# Patient Record
Sex: MALE | Race: WHITE | NOT HISPANIC OR LATINO | Employment: FULL TIME | URBAN - METROPOLITAN AREA
[De-identification: names, ages, dates, MRNs, and addresses within clinical notes are randomized per-mention and may not be internally consistent; named-entity substitution may affect disease eponyms.]

---

## 2021-05-26 ENCOUNTER — TELEPHONE (OUTPATIENT)
Dept: NEUROSURGERY | Facility: CLINIC | Age: 51
End: 2021-05-26

## 2021-05-26 NOTE — TELEPHONE ENCOUNTER
5/26/21   RECEIVED CALL FROM PT PCP TO HAVE NEUROSURGEON REVIEW PT RECENT MRI LSPINE AND CSPINE 5/17/21 [facility] FOR ANY URGENCY  DR Subhash Kerr WOULD LIKE TO REFER PT TO DR Hayley Flaherty  MRI'S REVIEWED BY DR Hayley Flaherty AND DR Cesar Cardona  OK TO SCHEDULE PT WITH DR Hayley Flaherty 5/28/21 IN [facility] TO REVIEW  PT NEEDS DISCS OF MRIS [facility] FOR APPT  CALLED PT 2X UNABLE TO LEAVE [facility]IC[place] NOT SET UP YET  WILL CONTINUE TO TRY TO CONTACT PT FOR APPT

## 2021-05-28 ENCOUNTER — TRANSCRIBE ORDERS (OUTPATIENT)
Dept: NEUROSURGERY | Facility: CLINIC | Age: 51
End: 2021-05-28

## 2021-05-28 DIAGNOSIS — M54.50 LOWER BACK PAIN: ICD-10-CM

## 2021-05-28 DIAGNOSIS — M54.2 NECK PAIN: Primary | ICD-10-CM

## 2021-06-03 ENCOUNTER — CONSULT (OUTPATIENT)
Dept: NEUROSURGERY | Facility: CLINIC | Age: 51
End: 2021-06-03
Payer: COMMERCIAL

## 2021-06-03 VITALS
HEART RATE: 61 BPM | TEMPERATURE: 98.6 F | DIASTOLIC BLOOD PRESSURE: 79 MMHG | BODY MASS INDEX: 22.48 KG/M2 | RESPIRATION RATE: 16 BRPM | SYSTOLIC BLOOD PRESSURE: 114 MMHG | WEIGHT: 166 LBS | HEIGHT: 72 IN

## 2021-06-03 DIAGNOSIS — M48.8X2 OSSIFICATION OF POSTERIOR LONGITUDINAL LIGAMENT IN CERVICAL REGION (HCC): ICD-10-CM

## 2021-06-03 DIAGNOSIS — M54.50 LOWER BACK PAIN: ICD-10-CM

## 2021-06-03 DIAGNOSIS — M54.2 NECK PAIN: ICD-10-CM

## 2021-06-03 DIAGNOSIS — M47.12 OTHER SPONDYLOSIS WITH MYELOPATHY, CERVICAL REGION: Primary | ICD-10-CM

## 2021-06-03 PROCEDURE — 99204 OFFICE O/P NEW MOD 45 MIN: CPT | Performed by: NEUROLOGICAL SURGERY

## 2021-06-03 RX ORDER — FINASTERIDE 5 MG/1
5 TABLET, FILM COATED ORAL DAILY
COMMUNITY
Start: 2021-05-25

## 2021-06-03 NOTE — PROGRESS NOTES
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 [facility])        Relevant Orders    XR spine cervical complete 6+ vw flex/ext/obl    CT cervical spine without contrast            Subjective:      Patient ID: Nick Delgadillo is a 46 y o  male  HPI    The following portions of the patient's history were reviewed and updated as appropriate: He  has a past medical history of Enlarged prostate  He There are no active problems to display for this patient  He  has a past surgical history that includes Abscess drainage  His family history includes Anxiety disorder in his father; Diabetes in his father; Hypertension in his mother; Rheum arthritis in his mother       Review of Systems   Constitutional: Negative  HENT: Negative  Eyes: Negative  Respiratory: Negative  Cardiovascular: Negative  Gastrointestinal: Negative  Endocrine: Negative  Genitourinary: Positive for difficulty urinating  Musculoskeletal: Positive for back pain (lower back pain left sided for about 1 year), gait problem (limping gait, unsteady gait), myalgias (muscle stiffenss difficulty bending neck and lower back), neck pain (radiates down left shoulder for about 2 years) and neck stiffness (difficulty turning head to the left)  PT/CHELY: NONE    8/10 pain level   Skin: Negative  Allergic/Immunologic: Negative  Neurological: Positive for weakness (Left leg), light-headedness (occasionally depending on neck positioning ) and numbness (Left hand tingling and B/L L>R tingling in feet)  Negative for dizziness  Hematological: Does not bruise/bleed easily  Psychiatric/Behavioral: Negative  Objective: There were no vitals taken for this visit  Physical Exam      I have personally obtain history and examined patient  I have personally reviewed case including all pertinent investigations/studies  Time spent 60 minutes  More than 50% of total time spent on counseling and coordination of care as described above including patient education, discussion of risks and rationale of conservative vs surgical treatment options  Presentation     Progressive  hx of neck stiffness/pain in with Arm /hand pain and paraesthesia  Associated weakness, loss of fine motor control, deficits in gait and coordination  Describes fingers and hands occasionally locking up  LBP with left LE radiation  Denies history of recent trauma, bowel or bladder deficit  Physical findings    Moderate restriction in cervical ROM  Positive spurlings R>L  Grade 4-/5 bilateral  strength  Reduced sensation in nonradicular distribution  Reduced fine motor and coordination  Reduced EZEKIEL  Increased DTR with positive pascual's L>R  Spreading of brachioradialis reflex  Slow wide based gait  Unsteady tandem gait        Radiology    MRI    Severe multilevel cervical spondylosis in the setting of spinal stenosis and OPLL C4-7  Levels  Marked disc/osteophyte complexes in conjunction with facet hypertrophy/ligmentum hypertrophy causing circumferential stenosis  Cord compression with marked cord signal change at C4/5  Canal diameter reduced to < 5 mm  Generalize foraminal stenosis    Moderate L5/S1 disc degeneration without significant central or foraminal stenosis          Discussion and Summary    Mr Hardy has neck pain/stiffness in the setting of cervical spinal stenosis/OPLL with  Radiculopathy and progressive myelopathy/cord compression  Today we discussed conservative  Treatments including PT, CHELY and medications for neck pain and back pain  In the setting of   Progressive deficits/myelpathy, I see no indication for these options  We discussed  The natural history of his conditions including the risk of progression vs catastrophic  Injury in the setting of trauma  I asked him to avoid vigorous head and neck movements,  Axial loading, chiropractic manipulation  I have ordered a flex/ext xray as well as a CT to better characterize his condition  I will see him in fu in 2 weeks to review

## 2021-06-22 ENCOUNTER — HOSPITAL ENCOUNTER (OUTPATIENT)
Dept: RADIOLOGY | Facility: HOSPITAL | Age: 51
Discharge: HOME | End: 2021-06-22
Attending: NEUROLOGICAL SURGERY
Payer: COMMERCIAL

## 2021-06-22 ENCOUNTER — HOSPITAL ENCOUNTER (OUTPATIENT)
Dept: CT IMAGING | Facility: HOSPITAL | Age: 51
Discharge: HOME | End: 2021-06-22
Attending: NEUROLOGICAL SURGERY
Payer: COMMERCIAL

## 2021-06-22 DIAGNOSIS — M54.50 LOWER BACK PAIN: ICD-10-CM

## 2021-06-22 DIAGNOSIS — M47.12 OTHER SPONDYLOSIS WITH MYELOPATHY, CERVICAL REGION: ICD-10-CM

## 2021-06-22 DIAGNOSIS — M54.2 NECK PAIN: ICD-10-CM

## 2021-06-22 DIAGNOSIS — M48.8X2 OSSIFICATION OF POSTERIOR LONGITUDINAL LIGAMENT IN CERVICAL REGION (HCC): ICD-10-CM

## 2021-06-22 PROCEDURE — G1004 CDSM NDSC: HCPCS

## 2021-06-22 PROCEDURE — 72125 CT NECK SPINE W/O DYE: CPT

## 2021-06-22 PROCEDURE — 72052 X-RAY EXAM NECK SPINE 6/>VWS: CPT

## 2021-06-25 ENCOUNTER — OFFICE VISIT (OUTPATIENT)
Dept: NEUROSURGERY | Facility: CLINIC | Age: 51
End: 2021-06-25
Payer: COMMERCIAL

## 2021-06-25 VITALS
SYSTOLIC BLOOD PRESSURE: 117 MMHG | RESPIRATION RATE: 16 BRPM | HEART RATE: 68 BPM | BODY MASS INDEX: 22.48 KG/M2 | HEIGHT: 72 IN | TEMPERATURE: 68 F | DIASTOLIC BLOOD PRESSURE: 71 MMHG | WEIGHT: 166 LBS

## 2021-06-25 DIAGNOSIS — M47.12 OTHER SPONDYLOSIS WITH MYELOPATHY, CERVICAL REGION: ICD-10-CM

## 2021-06-25 DIAGNOSIS — M54.12 RADICULOPATHY, CERVICAL: ICD-10-CM

## 2021-06-25 DIAGNOSIS — M54.2 NECK PAIN: Primary | ICD-10-CM

## 2021-06-25 DIAGNOSIS — M48.02 DEGENERATIVE CERVICAL SPINAL STENOSIS: ICD-10-CM

## 2021-06-25 PROCEDURE — 99214 OFFICE O/P EST MOD 30 MIN: CPT | Performed by: NEUROLOGICAL SURGERY

## 2021-06-25 RX ORDER — CEFAZOLIN SODIUM 1 G/50ML
1000 SOLUTION INTRAVENOUS ONCE
Status: CANCELLED | OUTPATIENT
Start: 2021-06-25 | End: 2021-06-25

## 2021-06-25 NOTE — PROGRESS NOTES
Assessment/Plan:    No problem-specific Assessment & Plan notes found for this encounter  Problem List Items Addressed This Visit     None      Visit Diagnoses     Neck pain    -  Primary    Relevant Orders    Case request operating room: CORPECTOMY SPINE CERVICAL ANTERIOR: C4-7 ACDF with c5 corpectomy/hemicorpectomy w anterior instrumentation and fusion (Completed)    Ambulatory referral to Internal Medicine    Other spondylosis with myelopathy, cervical region        Relevant Orders    Case request operating room: CORPECTOMY SPINE CERVICAL ANTERIOR: C4-7 ACDF with c5 corpectomy/hemicorpectomy w anterior instrumentation and fusion (Completed)    Ambulatory referral to Internal Medicine    Degenerative cervical spinal stenosis        Relevant Orders    Case request operating room: CORPECTOMY SPINE CERVICAL ANTERIOR: C4-7 ACDF with c5 corpectomy/hemicorpectomy w anterior instrumentation and fusion (Completed)    Ambulatory referral to Internal Medicine    Radiculopathy, cervical        Relevant Orders    Case request operating room: CORPECTOMY SPINE CERVICAL ANTERIOR: C4-7 ACDF with c5 corpectomy/hemicorpectomy w anterior instrumentation and fusion (Completed)    Ambulatory referral to Internal Medicine            Subjective:      Patient ID: Jerome Powell is a 46 y o  male  HPI    The following portions of the patient's history were reviewed and updated as appropriate: He  has a past medical history of Enlarged prostate  He There are no problems to display for this patient  He  has a past surgical history that includes Abscess drainage  His family history includes Anxiety disorder in his father; Diabetes in his father; Hypertension in his mother; Rheum arthritis in his mother  He  reports that he has been smoking  He has been smoking about 0 50 packs per day  He has never used smokeless tobacco  He reports that he does not drink alcohol and does not use drugs    Current Outpatient Medications Medication Sig Dispense Refill    finasteride (PROSCAR) 5 mg tablet Take 5 mg by mouth daily       No current facility-administered medications for this visit  Current Outpatient Medications on File Prior to Visit   Medication Sig    finasteride (PROSCAR) 5 mg tablet Take 5 mg by mouth daily     No current facility-administered medications on file prior to visit  He has No Known Allergies       Review of Systems   Constitutional: Negative  HENT: Negative  Eyes: Negative  Respiratory: Negative  Cardiovascular: Negative  Gastrointestinal: Negative  Endocrine: Negative  Genitourinary: Positive for difficulty urinating  Musculoskeletal: Positive for back pain (lower back pain left sided for about 1 year), gait problem (limping gait, unsteady gait), myalgias (muscle stiffenss difficulty bending neck and lower back), neck pain (radiates down left shoulder for about 2 years) and neck stiffness (difficulty turning head to the left)  PT/CHELY: NONE    8/10 pain level   Skin: Negative  Allergic/Immunologic: Negative  Neurological: Positive for weakness (Left leg), light-headedness (occasionally depending on neck positioning ) and numbness (Left hand tingling and B/L L>R tingling in feet)  Negative for dizziness  Hematological: Does not bruise/bleed easily  Psychiatric/Behavioral: Negative  Objective:      /71   Pulse 68   Temp (!) 68 °F (20 °C) (Tympanic)   Resp 16   Ht 6' (1 829 m)   Wt 75 3 kg (166 lb)   BMI 22 51 kg/m²          Physical Exam      I have personally obtain history and examined patient  I have personally reviewed case including all pertinent investigations/studies       Time spent 35 minutes   More than 50% of total time spent on counseling and coordination of care as described above including patient education, discussion of risks and rationale of conservative vs surgical treatment options        Presentation      Progressive  hx of neck stiffness/pain in with Arm /hand pain and paraesthesia  Associated weakness, loss of fine motor control, deficits in gait and coordination  Describes fingers and hands occasionally locking up  LBP with left LE radiation  Denies history of recent trauma, bowel or bladder deficit  CT ad xray done         Physical findings     Moderate restriction in cervical ROM  Positive spurlings R>L  Grade 4-/5 bilateral  strength  Reduced sensation in nonradicular distribution  Reduced fine motor and coordination  Reduced EZEKIEL  Increased DTR with positive pascual's L>R  Spreading of brachioradialis reflex  Slow wide based gait  Unsteady tandem gait           Radiology     MRI     Severe multilevel cervical spondylosis in the setting of spinal stenosis and OPLL C4-7  Levels  Marked disc/osteophyte complexes in conjunction with facet hypertrophy/ligmentum hypertrophy causing circumferential stenosis  Cord compression with marked cord signal change at C4/5  Canal diameter reduced to < 5 mm  Generalize foraminal stenosis     Moderate L5/S1 disc degeneration without significant central or foraminal stenosis      CT c spine    Moderate multilevel cervical spondylosis with central stenosis and osteophyte formation, no OPLL    Xray     Normal flex/ext in the setting of advance disc space degeneration        Discussion and Summary     Mr Hardy has neck pain/stiffness in the setting of cervical spinal stenosis with  Radiculopathy and progressive myelopathy/cord compression  Today we discussed conservative  Treatments including PT, CHELY and medications for neck pain and back pain  In the setting of   Progressive deficits/myelpathy, I see no indication for these options  We discussed  The natural history of his conditions including the risk of progression vs catastrophic  Injury in the setting of trauma  I asked him to avoid vigorous head and neck movements,  Axial loading, chiropractic manipulation   The risk of a multilevel anterior cervical decompression and fusion includes but is not limited to neurological deficit, csf leak, infection, bleeding, dysphagia and dysphonia, failure of fusion, loss of ROM  Mr Mobley Nahed understands and has provided informed consent for surgery  I also impressed on him the importance of complete smoking cessation

## 2022-06-02 ENCOUNTER — TELEPHONE (OUTPATIENT)
Dept: NEUROSURGERY | Facility: CLINIC | Age: 52
End: 2022-06-02

## 2022-06-02 NOTE — TELEPHONE ENCOUNTER
PATIENT CALLED THE OFFICE STATING THAT HE WAS PREVIOUSLY SCHEDULED FOR SX WITH DR ARZOLA BUT HE HAD TO CANCEL FOR PERSONAL REASONS AND NOW IS LOOKING TO COME BACK TO REDISCUSS  HE WAS LAST SEEN BY DR Cara Bella ON 6/25/21 AND WAS SCHEDULED FOR SX ON 11/24/21 WHICH CANCELED  LAST IMAGING: MRI CSPINE 5/14/21, CT CSPINE 6/22/21, XRAY CSPINE 6/22/21    OFFERED APPT WITH AN AP FOR WORK-UP BECAUSE HE WILL MOST LIKELY NEED UPDATED IMAGING     SCHEDULED FOR 6/6/22 AT 2:30PM WITH JOHNNIE MCHUGH

## 2022-06-06 ENCOUNTER — OFFICE VISIT (OUTPATIENT)
Dept: NEUROSURGERY | Facility: CLINIC | Age: 52
End: 2022-06-06
Payer: COMMERCIAL

## 2022-06-06 ENCOUNTER — HOSPITAL ENCOUNTER (OUTPATIENT)
Dept: MRI IMAGING | Facility: HOSPITAL | Age: 52
Discharge: HOME | End: 2022-06-06
Payer: COMMERCIAL

## 2022-06-06 VITALS
HEART RATE: 78 BPM | BODY MASS INDEX: 22.75 KG/M2 | TEMPERATURE: 98.1 F | RESPIRATION RATE: 16 BRPM | SYSTOLIC BLOOD PRESSURE: 119 MMHG | DIASTOLIC BLOOD PRESSURE: 78 MMHG | HEIGHT: 72 IN | WEIGHT: 168 LBS

## 2022-06-06 DIAGNOSIS — R25.2 SPASM: ICD-10-CM

## 2022-06-06 DIAGNOSIS — M47.12 OTHER SPONDYLOSIS WITH MYELOPATHY, CERVICAL REGION: Primary | ICD-10-CM

## 2022-06-06 DIAGNOSIS — M47.12 OTHER SPONDYLOSIS WITH MYELOPATHY, CERVICAL REGION: ICD-10-CM

## 2022-06-06 PROBLEM — M54.16 LUMBAR RADICULOPATHY: Status: ACTIVE | Noted: 2022-06-06

## 2022-06-06 PROCEDURE — 99215 OFFICE O/P EST HI 40 MIN: CPT | Performed by: PHYSICIAN ASSISTANT

## 2022-06-06 PROCEDURE — G1004 CDSM NDSC: HCPCS

## 2022-06-06 PROCEDURE — 72141 MRI NECK SPINE W/O DYE: CPT

## 2022-06-06 RX ORDER — BACLOFEN 5 MG/1
5 TABLET ORAL 3 TIMES DAILY
Qty: 30 TABLET | Refills: 0 | Status: SHIPPED | OUTPATIENT
Start: 2022-06-06 | End: 2022-07-23

## 2022-06-06 RX ORDER — IBUPROFEN 800 MG/1
TABLET ORAL EVERY 6 HOURS PRN
COMMUNITY
End: 2022-07-23

## 2022-06-06 NOTE — PROGRESS NOTES
Neurosurgery Office Note  Chelita Mendez 46 y o  male MRN: [id number]      Assessment/Plan     Other spondylosis with myelopathy, cervical region  Patient presents today for follow-up of cervical spondylosis with known cervical cord compression  · Patient with cervical myelopathy and stenosis last seen one year ago and was recommended surgical intervention  Unfortunately patient had a defer surgery as his wife needed surgery and has been recovering  · He admits to progressive neurological decline in his gait as well as weakness and dropping objects  Admits to urinary urgency but no incontinence  Imaging:    Cervical x-ray 6/22/21:  Degenerative changes as noted above with bilateral foraminal narrowing  No acute osseous abnormalities  No evidence of dynamic instability on flexion or extension   MRI cervical spine without 5/2021:  Multilevel degenerative changes with central cervical stenosis most significant at C4-5 with cord signal abnormality at C5  Lesser degree of central canal stenosis at C5-6  Plan:    Continue to monitor her neurological symptoms    MRI imaging May 2021 reviewed with patient and his significant other  There is severe central canal stenosis with cord compression and signal abnormality at C4-5   Discussed recommendation for surgical intervention  Given duration since prior MRI as well as further myelopathic decline, new MRI cervical spine ordered  Imaging required to evaluate for any progressive degenerative changes at other levels to ensure appropriate surgical procedure recommended   Discussed increased risk of cervical cord injury with any fall or trauma   Recommend stat MRI imaging given patient's gait and fall risk as well as known need for surgical intervention   Recommend follow-up with surgeon after MRI imaging completed   Given spasm offered one time script for baclofen  Recommend trying at bedtime or when home     Patient aware to call the office with any questions concerns new or progressive symptoms  Fall precautions were reviewed  Lumbar radiculopathy  Patient complaining of low back pain with right lower extremity pain, sensation changes and spasms  Prior MRI lumbar spine 2021 with multilevel degenerative changes most significant at L5/S1 with a right paracentral disc herniation  Recommend addressing cervical spine prior to lumbar spine  Could consider conservative management for spine including medications, pain management as well as physical therapy  Diagnoses and all orders for this visit:    Other spondylosis with myelopathy, cervical region  -     MRI cervical spine without contrast; Future  -     baclofen 5 MG TABS; Take 5 mg by mouth 3 (three) times a day    Spasm  -     baclofen 5 MG TABS; Take 5 mg by mouth 3 (three) times a day    Other orders  -     ibuprofen (MOTRIN) 800 mg tablet; Take by mouth every 6 (six) hours as needed for mild pain          I spent 40 minutes with the patient today in which >50% of the time was spent counseling/coordination of care regarding diagnosis, imaging review, symptoms and treatment plan  CHIEF COMPLAINT    Chief Complaint   Patient presents with    Follow-up      FU FOR WORK-UP; WAS OFFERED SX BY [facility] LAST YEAR BUT UNABLE TO DO AND MIGHT NEED NEW IMAGING (SEE PHONE NOTE)       HISTORY    History of Present Illness     This is a 51-year-old male right-handed  past medical history significant for enlarged prostate and history of perianal abscess drainage years ago who presents today for follow-up of cervical myelopathy and cord compression  Patient was last seen by Dr Puneet Eckert in June 2021 with complaints of cervical radiculopathy as well as progressive myelopathy secondary to cord compression    Given progressive deficits myelopathy at that time no recommendations for conservative intervention and he was offered an aunt anterior cervical diskectomy fixation fusion C4-C7 with a C5 kayla corpectomy  Unfortunately patient deferred his surgery as his wife required surgery and has since made recovery  For this reason, patient presents today in follow-up  Today, patient is complaining of 10/10 left neck pain with radiation into left arm as well as weakness  He admits to consistently dropping objects  Endorses unsteady gait and leans on the wall when ambulating  Denies the use of an assistive device  Denies any falls or trauma  Endorses urinary urgency but does drink high quantity of water to try to assist with spasms as well as prevent dehydration given heat at his job  He endorses significant spasms especially in his legs at nighttime  Endorses constipation and routine use of over-the-counter stool softeners  Over a year ago, he had two episodes of bowel incontinence but none since  In addition, patient complains of low back pain with radiation down his right lower extremity  She is unable to feel temperature below his right knee with associated numbness and tingling of his right toes  REVIEW OF SYSTEMS    Review of Systems   Constitutional: Positive for activity change  Negative for fatigue  HENT: Negative  Negative for trouble swallowing and voice change  Eyes: Negative  Respiratory: Negative  Cardiovascular: Negative  Gastrointestinal: Positive for constipation  Negative for nausea and vomiting  Endocrine: Negative  Genitourinary: Positive for urgency (h/o bowel incontinence, nothing recently)  Negative for frequency  Musculoskeletal: Positive for back pain (presents with back brace; Mid back to lower back ), gait problem (unsteady), myalgias (muscle spasms and cramping in bi legs) and neck pain (constant left sided neck pain and radiates into the left shoulder and left arm)  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  Ibuprofen for pain - takes edge off per patient      PT - No  INJ - No    No falls     Skin: Negative  Allergic/Immunologic: Negative  Neurological: Positive for weakness (bi arms, frequently drops objects from bi arms) and numbness (and tingling bi arms and right leg)  Negative for dizziness  Hematological: Negative  Does not bruise/bleed easily  Psychiatric/Behavioral: Positive for sleep disturbance  ROS obtained by MA  Please see above  Meds/Allergies     Current Outpatient Medications   Medication Sig Dispense Refill    baclofen 5 MG TABS Take 5 mg by mouth 3 (three) times a day 30 tablet 0    ibuprofen (MOTRIN) 800 mg tablet Take by mouth every 6 (six) hours as needed for mild pain      finasteride (PROSCAR) 5 mg tablet Take 5 mg by mouth daily (Patient not taking: No sig reported)       No current facility-administered medications for this visit  No Known Allergies    PAST HISTORY    Past Medical History:   Diagnosis Date    Enlarged prostate        Past Surgical History:   Procedure Laterality Date    ABCESS DRAINAGE         Social History     Tobacco Use    Smoking status: Former Smoker     Packs/day: 0 50    Smokeless tobacco: Never Used    Tobacco comment: quit 9 months ago   Substance Use Topics    Alcohol use: Never    Drug use: Never       Family History   Problem Relation Age of Onset    Hypertension Mother     Rheum arthritis Mother     Diabetes Father     Anxiety disorder Father          Above history personally reviewed  EXAM    Vitals:Blood pressure 119/78, pulse 78, temperature 98 1 °F (36 7 °C), temperature source Probe, resp  rate 16, height 6' (1 829 m), weight 76 2 kg (168 lb)  ,Body mass index is 22 78 kg/m²  Physical Exam  Constitutional:       General: He is not in acute distress  Appearance: Normal appearance  He is well-developed  He is not ill-appearing  HENT:      Head: Normocephalic and atraumatic        Right Ear: External ear normal       Left Ear: External ear normal       Nose: Nose normal  Mouth/Throat:      Mouth: Mucous membranes are moist    Eyes:      General: No scleral icterus  Right eye: No discharge  Left eye: No discharge  Conjunctiva/sclera: Conjunctivae normal    Cardiovascular:      Rate and Rhythm: Normal rate  Pulmonary:      Effort: Pulmonary effort is normal  No respiratory distress  Musculoskeletal:         General: Tenderness (Low lumbar spine) present  Cervical back: Tenderness present  Skin:     General: Skin is warm and dry  Neurological:      Mental Status: He is alert  Deep Tendon Reflexes:      Reflex Scores:       Bicep reflexes are 2+ on the right side and 2+ on the left side  Patellar reflexes are 3+ on the right side and 3+ on the left side  Psychiatric:         Mood and Affect: Mood normal          Speech: Speech normal          Behavior: Behavior normal          Thought Content: Thought content normal          Judgment: Judgment normal          Neurologic Exam     Mental Status   Follows 3 step commands  Attention: normal  Concentration: normal    Speech: speech is normal   Level of consciousness: alert  Knowledge: good  Normal comprehension  Cranial Nerves     CN III, IV, VI   Conjugate gaze: present    CN VII   Facial expression full, symmetric  CN VIII   Hearing: intact    Motor Exam   Muscle bulk: normal  Overall muscle tone: normal    Strength   Strength 5/5 except as noted  B/l HF 4/5   4/5 b/l   IO left 4/5  BUE otherwise 4+     Sensory Exam   Right arm light touch: normal  Left arm light touch: abnl lateral upper arm and medial forearm      Gait, Coordination, and Reflexes     Gait  Gait: spastic    Tremor   Resting tremor: absent  Intention tremor: absent  Action tremor: absent    Reflexes   Right biceps: 2+  Left biceps: 2+  Right patellar: 3+  Left patellar: 3+  Right [place]: absent  Left [place]: absent  Right ankle clonus: present  Left ankle clonus: present        MEDICAL DECISION MAKING    Imaging Studies:     MRI cervical spine 5/2021    I have personally reviewed pertinent reports     and I have personally reviewed pertinent films in PACS

## 2022-06-06 NOTE — ASSESSMENT & PLAN NOTE
Patient complaining of low back pain with right lower extremity pain, sensation changes and spasms  Prior MRI lumbar spine 2021 with multilevel degenerative changes most significant at L5/S1 with a right paracentral disc herniation  Recommend addressing cervical spine prior to lumbar spine  Could consider conservative management for spine including medications, pain management as well as physical therapy

## 2022-06-06 NOTE — ASSESSMENT & PLAN NOTE
Patient presents today for follow-up of cervical spondylosis with known cervical cord compression  · Patient with cervical myelopathy and stenosis last seen one year ago and was recommended surgical intervention  Unfortunately patient had a defer surgery as his wife needed surgery and has been recovering  · He admits to progressive neurological decline in his gait as well as weakness and dropping objects  Admits to urinary urgency but no incontinence  Imaging:    Cervical x-ray 6/22/21:  Degenerative changes as noted above with bilateral foraminal narrowing  No acute osseous abnormalities  No evidence of dynamic instability on flexion or extension   MRI cervical spine without 5/2021:  Multilevel degenerative changes with central cervical stenosis most significant at C4-5 with cord signal abnormality at C5  Lesser degree of central canal stenosis at C5-6  Plan:    Continue to monitor her neurological symptoms    MRI imaging May 2021 reviewed with patient and his significant other  There is severe central canal stenosis with cord compression and signal abnormality at C4-5   Discussed recommendation for surgical intervention  Given duration since prior MRI as well as further myelopathic decline, new MRI cervical spine ordered  Imaging required to evaluate for any progressive degenerative changes at other levels to ensure appropriate surgical procedure recommended   Discussed increased risk of cervical cord injury with any fall or trauma   Recommend stat MRI imaging given patient's gait and fall risk as well as known need for surgical intervention   Recommend follow-up with surgeon after MRI imaging completed   Given spasm offered one time script for baclofen  Recommend trying at bedtime or when home   Patient aware to call the office with any questions concerns new or progressive symptoms  Fall precautions were reviewed

## 2022-06-07 ENCOUNTER — OFFICE VISIT (OUTPATIENT)
Dept: NEUROSURGERY | Facility: CLINIC | Age: 52
End: 2022-06-07
Payer: COMMERCIAL

## 2022-06-07 VITALS
HEIGHT: 72 IN | SYSTOLIC BLOOD PRESSURE: 124 MMHG | DIASTOLIC BLOOD PRESSURE: 80 MMHG | BODY MASS INDEX: 22.75 KG/M2 | HEART RATE: 72 BPM | RESPIRATION RATE: 16 BRPM | WEIGHT: 168 LBS | TEMPERATURE: 98.5 F

## 2022-06-07 DIAGNOSIS — G95.20 SPINAL CORD COMPRESSION (HCC): ICD-10-CM

## 2022-06-07 DIAGNOSIS — G95.9 MYELOPATHY (HCC): ICD-10-CM

## 2022-06-07 DIAGNOSIS — M47.12 OTHER SPONDYLOSIS WITH MYELOPATHY, CERVICAL REGION: Primary | ICD-10-CM

## 2022-06-07 PROCEDURE — 99215 OFFICE O/P EST HI 40 MIN: CPT | Performed by: NEUROLOGICAL SURGERY

## 2022-06-07 NOTE — PROGRESS NOTES
Neurosurgery Office Note  Jg Lara 46 y o  male MRN: [id number]      Assessment/Plan      Diagnoses and all orders for this visit:    Other spondylosis with myelopathy, cervical region  -     Case request operating room: POSTERIOR CERVICAL LAMINECTOMY C3-6, FIXATION FUSION C2-T1; Standing  -     Case request operating room: POSTERIOR CERVICAL LAMINECTOMY C3-6, FIXATION FUSION C2-T1    Myelopathy ([facility] [address] )  -     Case request operating room: POSTERIOR CERVICAL LAMINECTOMY C3-6, FIXATION FUSION C2-T1; Standing  -     Case request operating room: POSTERIOR CERVICAL LAMINECTOMY C3-6, FIXATION FUSION C2-T1    Spinal cord compression ([facility] [address] )  -     Case request operating room: POSTERIOR CERVICAL LAMINECTOMY C3-6, FIXATION FUSION C2-T1; Standing  -     Case request operating room: POSTERIOR CERVICAL LAMINECTOMY C3-6, FIXATION FUSION C2-T1        Discussion:    Pain Score: 10-Worst pain ever (neck, middle to lower back (spine) to bilateral buttocks, tailbone, legs)    80-year-old male seen today in follow-up after seeing our AP, CRISELDA, yesterday in the office  He was also seen previously by Dr Sachin Phillip, and offered surgery for cervical spine  The patient ultimately deferred on this secondary to the health concerns of his wife  He returns today, describing his worsening neurologic function  Feels his gait is very unsteady, and is dropping items with his hands  He has painful lower extremity thigh spasms  He has numbness in his bilateral lateral forearms and hands  He also mentions neck pain  He has urinary urgency  Mentions constipation, but previous occasional bowel incontinence  mJOA as below  On exam, patient is rather unsteady  He is not using any aids, but he does rely on the wheelchair in which he pushes his wife  His  is mildly weak, 4/5 bilaterally  He is pronounced patellar reflexes, as well as clonus  He is mild Lancaster's left more than right      MRI scan done yesterday, 6/6/22, shows worsening of his cervical stenosis and spinal cord compression compared to a study from last year  He has the persistent myelomalacia at C5, and high-grade compression around this level  Has very normal, adequate lordosis, with no profound anterior vector of compression  CT scan of the cervical spine also reviewed there is no evidence of OPLL  Upright x-rays also show good lordosis  I discussed with the patient the natural history of cervical myelopathy  I have reviewed with him his MRI scan and discussed the etiology of many of his symptoms  I agree with treating him with baclofen, which was prescribed yesterday, but he has not started as yet  This hopefully will help with his lower extremity symptoms i e  spasm  I discussed the expected benefit of surgical decompression i e  prevention of worsening of neurologic signs and symptoms, with the hope that he will have some mild improvement  I am recommending to him a cervical posterior decompression, with fixation fusion, further reasons labeled above  I reviewed the expected hospital course, and recovery for this procedure with the patient and his wife  I also discussed specifically the attendant risks, including but not limited to infection, bleeding, VTE, spinal fluid leak, hardware failure requiring revision, transient or permanent neurologic dysfunction, such as C5 palsy  He understands and would like to proceed  Written consent obtained  Plan:  Posterior cervical laminectomy C3-6, with fixation fusion C2-T1  I did caution him that this procedure is not particularly effective for treating neck pain, and would also likely reduce some of his range of motion  I did discuss with him he will need to wear a hard cervical collar for 6-12 weeks postop  The patient does not presently drive, so this would not impact that aspect of his functioning  However, I did caution that he likely would be out of work for up to 12 weeks      The patient does not have a PCP, but we will utilize the PAT system to proceed to clearance  Should there be any concerns with his testing, we may need to refer him to a PCP for clearance etc  06/07/22 Metrics: [id number]=5 266; VAS 50; MJOA 9/17      CHIEF COMPLAINT    Chief Complaint   Patient presents with    Follow-up     MRI CSPINE 6/6 OK PER [facility]       HISTORY    History of Present Illness     46y o  year old male     HPI    See Discussion    REVIEW OF SYSTEMS    Review of Systems   Constitutional: Positive for activity change  Negative for fatigue  HENT: Negative  Negative for trouble swallowing and voice change  Eyes: Negative  Respiratory: Negative  Cardiovascular: Negative  Gastrointestinal: Positive for constipation  Negative for nausea and vomiting  Endocrine: Negative  Genitourinary: Positive for urgency (h/o bowel incontinence, nothing recently)  Negative for frequency  Musculoskeletal: Positive for back pain (presents with back brace; Mid back to lower back ), gait problem (unsteady), myalgias (muscle spasms and cramping in bi legs) and neck pain (constant left sided neck pain and radiates into the left shoulder and left arm)  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  Ibuprofen for pain - takes edge off per patient  PT - No  INJ - No    No falls     Skin: Negative  Allergic/Immunologic: Negative  Neurological: Positive for tremors (shaking of head or right leg), weakness (bi arms, frequently drops objects from bi arms) and numbness (and tingling bi arms and right leg)  Negative for dizziness, seizures, syncope and headaches  Hematological: Negative  Does not bruise/bleed easily  Psychiatric/Behavioral: Positive for sleep disturbance           Meds/Allergies     Current Outpatient Medications   Medication Sig Dispense Refill    baclofen 5 MG TABS Take 5 mg by mouth 3 (three) times a day 30 tablet 0    ibuprofen (MOTRIN) 800 mg tablet Take by mouth every 6 (six) hours as needed for mild pain      finasteride (PROSCAR) 5 mg tablet Take 5 mg by mouth daily (Patient not taking: No sig reported)       No current facility-administered medications for this visit  No Known Allergies    PAST HISTORY    Past Medical History:   Diagnosis Date    Enlarged prostate        Past Surgical History:   Procedure Laterality Date    ABCESS DRAINAGE      KNEE ARTHROSCOPY Left     as a teenager       Social History     Tobacco Use    Smoking status: Former Smoker     Packs/day: 0 50    Smokeless tobacco: Never Used    Tobacco comment: quit 9 months ago   Vaping Use    Vaping Use: Never used   Substance Use Topics    Alcohol use: Never    Drug use: Never       Family History   Problem Relation Age of Onset    Hypertension Mother     Rheum arthritis Mother     Diabetes Father     Anxiety disorder Father          The following portions of the patient's history were reviewed in this encounter and updated as appropriate: Past medical, surgical, family, and social history, as well as medications, allergies, and review of systems  EXAM    Vitals:Blood pressure 124/80, pulse 72, temperature 98 5 °F (36 9 °C), resp  rate 16, height 6' (1 829 m), weight 76 2 kg (168 lb)  ,Body mass index is 22 78 kg/m²  Physical Exam  Vitals reviewed  Constitutional:       Appearance: Normal appearance  He is well-developed  HENT:      Head: Normocephalic and atraumatic  Eyes:      General: No scleral icterus  Cardiovascular:      Rate and Rhythm: Normal rate  Pulmonary:      Effort: Pulmonary effort is normal    Musculoskeletal:      Cervical back: Neck supple  Skin:     General: Skin is warm and dry  Neurological:      Mental Status: He is alert  Sensory: No sensory deficit     Psychiatric:         Behavior: Behavior normal        Neurologic Exam    See discussion      MEDICAL DECISION MAKING    Imaging Studies:     MRI cervical spine without contrast    Result Date: 6/7/2022  Narrative: MRI CERVICAL SPINE WITHOUT CONTRAST INDICATION: M47 12: Other spondylosis with myelopathy, cervical region  Stenosis  Surgical planning  COMPARISON:  5/14/2021 from [address]:  Sagittal T1, sagittal T2, sagittal inversion recovery, axial T2, axial  2D merge IMAGE QUALITY:  Diagnostic FINDINGS: ALIGNMENT:  Normal MARROW SIGNAL:  Scattered degenerative endplate changes mildly increased at C3-C4 and C4-C5 no mass, fracture or otherwise suspicious marrow edema    CERVICAL AND VISUALIZED THORACIC CORD:  Similar focal increased cord signal at C4-C5  Cord compression secondary to disc pathology to be described below  Otherwise preserved cord caliber  No new abnormality  PREVERTEBRAL AND PARASPINAL SOFT TISSUES:  Within normal limits  VISUALIZED POSTERIOR FOSSA:  Developmental flip cisterna magna, typically clinically silent  No acute findings  CERVICAL DISC SPACES:  Persistent multilevel mild loss of height  C2-C3:  Within normal limits  C3-C4: Similar disc bulge and left greater than right uncovertebral hypertrophy resulting in mild right and moderate left foraminal and minimal central canal narrowing  C4-C5:  Similar disc bulge and superimposed central disc protrusion resulting in marked central canal and moderate bilateral foraminal narrowing, effacement of the dorsal and ventral thecal sac with flattening of the cord  C5-C6:  Similar disc bulge and left greater than right uncovertebral hypertrophy  Moderate central canal and foraminal narrowing  C6-C7:  Similar disc bulge resulting in mild central canal and foraminal narrowing  C7-T1:  Small disc bulge without significant stenosis  UPPER THORACIC DISC SPACES:  Within normal limits  Impression: Similar appearance to prior study from 5/14/2021  Detailed description above  Multilevel degenerative disc disease resulting in predominantly mild to moderate stenoses    Detailed description above  At C4-C5, persistent marked central canal narrowing with cord compression and myelomalacia  Workstation performed: [id number]       I have personally reviewed pertinent reports  and I have personally reviewed pertinent films in PACS    Upright cervical x-rays, MRI scan from 2021, and CT cervical spine also reviewed personally  PLEASE NOTE:  This encounter may have been completed utilizing the [employer] Direct Speech Voice Recognition Software  Grammatical errors, random word insertions, pronoun errors and incomplete sentences are occasional consequences of the system due to software limitations, ambient noise and hardware issues  These may be missed by proof reading prior to affixing electronic signature  Any questions or concerns about the content, text or information contained within the body of this dictation should be directly addressed to the advanced practitioner or physician for clarification

## 2022-06-29 ENCOUNTER — APPOINTMENT (OUTPATIENT)
Dept: RADIOLOGY | Facility: HOSPITAL | Age: 52
End: 2022-06-29
Payer: COMMERCIAL

## 2022-06-29 ENCOUNTER — APPOINTMENT (OUTPATIENT)
Dept: LAB | Facility: HOSPITAL | Age: 52
End: 2022-06-29
Payer: COMMERCIAL

## 2022-06-29 DIAGNOSIS — Z01.818 PRE-OP EXAM: ICD-10-CM

## 2022-06-29 DIAGNOSIS — M47.12 OTHER SPONDYLOSIS WITH MYELOPATHY, CERVICAL REGION: ICD-10-CM

## 2022-06-29 DIAGNOSIS — G95.20 SPINAL CORD COMPRESSION (HCC): ICD-10-CM

## 2022-06-29 DIAGNOSIS — G95.9 MYELOPATHY (HCC): ICD-10-CM

## 2022-06-29 LAB
ALBUMIN SERPL BCP-MCNC: 3.5 G/DL (ref 3.5–5)
ALP SERPL-CCNC: 75 U/L (ref 46–116)
ALT SERPL W P-5'-P-CCNC: 31 U/L (ref 12–78)
ANION GAP SERPL CALCULATED.3IONS-SCNC: 7 MMOL/L (ref 4–13)
APTT PPP: 30 SECONDS (ref 23–37)
AST SERPL W P-5'-P-CCNC: 29 U/L (ref 5–45)
BASOPHILS # BLD AUTO: 0.02 THOUSANDS/ΜL (ref 0–0.1)
BASOPHILS NFR BLD AUTO: 1 % (ref 0–1)
BILIRUB SERPL-MCNC: 0.45 MG/DL (ref 0.2–1)
BUN SERPL-MCNC: 8 MG/DL (ref 5–25)
CALCIUM SERPL-MCNC: 8.5 MG/DL (ref 8.3–10.1)
CHLORIDE SERPL-SCNC: 106 MMOL/L (ref 100–108)
CO2 SERPL-SCNC: 31 MMOL/L (ref 21–32)
CREAT SERPL-MCNC: 0.97 MG/DL (ref 0.6–1.3)
EOSINOPHIL # BLD AUTO: 0.35 THOUSAND/ΜL (ref 0–0.61)
EOSINOPHIL NFR BLD AUTO: 8 % (ref 0–6)
ERYTHROCYTE [DISTWIDTH] IN BLOOD BY AUTOMATED COUNT: 13.4 % (ref 11.6–15.1)
EST. AVERAGE GLUCOSE BLD GHB EST-MCNC: 126 MG/DL
GFR SERPL CREATININE-BSD FRML MDRD: 89 ML/MIN/1.73SQ M
GLUCOSE P FAST SERPL-MCNC: 95 MG/DL (ref 65–99)
HBA1C MFR BLD: 6 %
HCT VFR BLD AUTO: 42.4 % (ref 36.5–49.3)
HGB BLD-MCNC: 14 G/DL (ref 12–17)
IMM GRANULOCYTES # BLD AUTO: 0.01 THOUSAND/UL (ref 0–0.2)
IMM GRANULOCYTES NFR BLD AUTO: 0 % (ref 0–2)
INR PPP: 0.93 (ref 0.84–1.19)
LYMPHOCYTES # BLD AUTO: 1.98 THOUSANDS/ΜL (ref 0.6–4.47)
LYMPHOCYTES NFR BLD AUTO: 45 % (ref 14–44)
MCH RBC QN AUTO: 29.4 PG (ref 26.8–34.3)
MCHC RBC AUTO-ENTMCNC: 33 G/DL (ref 31.4–37.4)
MCV RBC AUTO: 89 FL (ref 82–98)
MONOCYTES # BLD AUTO: 0.46 THOUSAND/ΜL (ref 0.17–1.22)
MONOCYTES NFR BLD AUTO: 10 % (ref 4–12)
NEUTROPHILS # BLD AUTO: 1.6 THOUSANDS/ΜL (ref 1.85–7.62)
NEUTS SEG NFR BLD AUTO: 36 % (ref 43–75)
NRBC BLD AUTO-RTO: 0 /100 WBCS
PLATELET # BLD AUTO: 265 THOUSANDS/UL (ref 149–390)
PMV BLD AUTO: 9 FL (ref 8.9–12.7)
POTASSIUM SERPL-SCNC: 4.6 MMOL/L (ref 3.5–5.3)
PROT SERPL-MCNC: 7 G/DL (ref 6.4–8.2)
PROTHROMBIN TIME: 12.1 SECONDS (ref 11.6–14.5)
RBC # BLD AUTO: 4.76 MILLION/UL (ref 3.88–5.62)
SODIUM SERPL-SCNC: 144 MMOL/L (ref 136–145)
WBC # BLD AUTO: 4.42 THOUSAND/UL (ref 4.31–10.16)

## 2022-06-29 PROCEDURE — 83036 HEMOGLOBIN GLYCOSYLATED A1C: CPT

## 2022-06-29 PROCEDURE — 85730 THROMBOPLASTIN TIME PARTIAL: CPT

## 2022-06-29 PROCEDURE — 80053 COMPREHEN METABOLIC PANEL: CPT

## 2022-06-29 PROCEDURE — 87081 CULTURE SCREEN ONLY: CPT

## 2022-06-29 PROCEDURE — 85610 PROTHROMBIN TIME: CPT

## 2022-06-29 PROCEDURE — 85025 COMPLETE CBC W/AUTO DIFF WBC: CPT

## 2022-06-30 ENCOUNTER — ANESTHESIA EVENT (OUTPATIENT)
Dept: PERIOP | Facility: HOSPITAL | Age: 52
DRG: 472 | End: 2022-06-30
Payer: COMMERCIAL

## 2022-06-30 LAB — MRSA NOSE QL CULT: NORMAL

## 2022-06-30 NOTE — ANESTHESIA PREPROCEDURE EVALUATION
Procedure:  POSTERIOR CERVICAL LAMINECTOMY C3-6, FIXATION FUSION C2-T1 (IMPULSE MONITORING) (N/A Spine Cervical)    Relevant Problems   MUSCULOSKELETAL   (+) Other spondylosis with myelopathy, cervical region          Physical Exam    Airway    Mallampati score: II  TM Distance: >3 FB  Neck ROM: full     Dental   No notable dental hx     Cardiovascular  Cardiovascular exam normal    Pulmonary  Pulmonary exam normal     Other Findings        Anesthesia Plan  ASA Score- 1     Anesthesia Type- general with ASA Monitors  Additional Monitors:   Airway Plan: ETT  Plan Factors-Exercise tolerance (METS): >4 METS  Chart reviewed  EKG reviewed  Existing labs reviewed  Patient summary reviewed  Patient is not a current smoker  Induction- intravenous  Postoperative Plan- Plan for postoperative opioid use  Informed Consent- Anesthetic plan and risks discussed with patient  I personally reviewed this patient with the CRNA  Discussed and agreed on the Anesthesia Plan with the CRNA  Chris Rausch

## 2022-07-01 NOTE — PRE-PROCEDURE INSTRUCTIONS
Pre-Surgery Instructions:   Medication Instructions    ibuprofen (MOTRIN) 800 mg tablet Stop taking 3 days prior to surgery  Covid screening negative as per patient  Reviewed pre op medicine and neuro showering instructions with patient via phone call, verbalizes understanding  Advised patient to not take vitamins, herbal meds nor ASA 7 days pre op  Advised to stop taking NSAID's 3 days pre op but may take Tylenol products if needed  Advised to take DOS medicine with a small sip water  Advised NPO after MN prior to surgery and surgical services will call (7/18) with scheduled time of hospital arrival     Pt verbalized understanding of all instructions

## 2022-07-14 ENCOUNTER — OFFICE VISIT (OUTPATIENT)
Dept: LAB | Facility: HOSPITAL | Age: 52
End: 2022-07-14
Payer: COMMERCIAL

## 2022-07-14 ENCOUNTER — HOSPITAL ENCOUNTER (OUTPATIENT)
Dept: RADIOLOGY | Facility: HOSPITAL | Age: 52
Discharge: HOME | End: 2022-07-14
Payer: COMMERCIAL

## 2022-07-14 DIAGNOSIS — G95.9 MYELOPATHY (HCC): ICD-10-CM

## 2022-07-14 DIAGNOSIS — G95.20 SPINAL CORD COMPRESSION (HCC): ICD-10-CM

## 2022-07-14 DIAGNOSIS — Z01.818 PRE-OP TESTING: ICD-10-CM

## 2022-07-14 DIAGNOSIS — M47.12 OTHER SPONDYLOSIS WITH MYELOPATHY, CERVICAL REGION: ICD-10-CM

## 2022-07-14 PROCEDURE — 71046 X-RAY EXAM CHEST 2 VIEWS: CPT

## 2022-07-14 PROCEDURE — 93005 ELECTROCARDIOGRAM TRACING: CPT

## 2022-07-15 LAB
ATRIAL RATE: 55 BPM
P AXIS: 70 DEGREES
PR INTERVAL: 148 MS
QRS AXIS: 85 DEGREES
QRSD INTERVAL: 94 MS
QT INTERVAL: 404 MS
QTC INTERVAL: 386 MS
T WAVE AXIS: 64 DEGREES
VENTRICULAR RATE: 55 BPM

## 2022-07-15 PROCEDURE — 93010 ELECTROCARDIOGRAM REPORT: CPT | Performed by: INTERNAL MEDICINE

## 2022-07-19 ENCOUNTER — HOSPITAL ENCOUNTER (INPATIENT)
Facility: HOSPITAL | Age: 52
LOS: 4 days | Discharge: HOME | DRG: 472 | End: 2022-07-23
Attending: NEUROLOGICAL SURGERY | Admitting: NEUROLOGICAL SURGERY
Payer: COMMERCIAL

## 2022-07-19 ENCOUNTER — ANESTHESIA (OUTPATIENT)
Dept: PERIOP | Facility: HOSPITAL | Age: 52
DRG: 472 | End: 2022-07-19
Payer: COMMERCIAL

## 2022-07-19 ENCOUNTER — APPOINTMENT (OUTPATIENT)
Dept: RADIOLOGY | Facility: HOSPITAL | Age: 52
DRG: 472 | End: 2022-07-19
Payer: COMMERCIAL

## 2022-07-19 DIAGNOSIS — M54.16 LUMBAR RADICULOPATHY: ICD-10-CM

## 2022-07-19 DIAGNOSIS — Z01.818 PRE-OP TESTING: Primary | ICD-10-CM

## 2022-07-19 DIAGNOSIS — Z98.1 S/P CERVICAL SPINAL FUSION: ICD-10-CM

## 2022-07-19 LAB
PLATELET # BLD AUTO: 305 THOUSANDS/UL (ref 149–390)
PMV BLD AUTO: 9 FL (ref 8.9–12.7)

## 2022-07-19 PROCEDURE — C1713 ANCHOR/SCREW BN/BN,TIS/BN: HCPCS | Performed by: NEUROLOGICAL SURGERY

## 2022-07-19 PROCEDURE — 22843 INSERT SPINE FIXATION DEVICE: CPT | Performed by: NEUROLOGICAL SURGERY

## 2022-07-19 PROCEDURE — 20936 SP BONE AGRFT LOCAL ADD-ON: CPT | Performed by: NEUROLOGICAL SURGERY

## 2022-07-19 PROCEDURE — 63015 REMOVE SPINE LAMINA >2 CRVCL: CPT | Performed by: NEUROLOGICAL SURGERY

## 2022-07-19 PROCEDURE — C1781 MESH (IMPLANTABLE): HCPCS | Performed by: NEUROLOGICAL SURGERY

## 2022-07-19 PROCEDURE — 72040 X-RAY EXAM NECK SPINE 2-3 VW: CPT

## 2022-07-19 PROCEDURE — 22600 ARTHRD PST TQ 1NTRSPC CRV: CPT | Performed by: NEUROLOGICAL SURGERY

## 2022-07-19 PROCEDURE — 20930 SP BONE ALGRFT MORSEL ADD-ON: CPT | Performed by: NEUROLOGICAL SURGERY

## 2022-07-19 PROCEDURE — 63045 LAM FACETEC & FORAMOT CRV: CPT | Performed by: NEUROLOGICAL SURGERY

## 2022-07-19 PROCEDURE — 0RG207J FUSION OF 2 OR MORE CERVICAL VERTEBRAL JOINTS WITH AUTOLOGOUS TISSUE SUBSTITUTE, POSTERIOR APPROACH, ANTERIOR COLUMN, OPEN APPROACH: ICD-10-PCS | Performed by: NEUROLOGICAL SURGERY

## 2022-07-19 PROCEDURE — 85049 AUTOMATED PLATELET COUNT: CPT | Performed by: PHYSICIAN ASSISTANT

## 2022-07-19 PROCEDURE — 99024 POSTOP FOLLOW-UP VISIT: CPT | Performed by: NEUROLOGICAL SURGERY

## 2022-07-19 PROCEDURE — 22614 ARTHRD PST TQ 1NTRSPC EA ADD: CPT | Performed by: NEUROLOGICAL SURGERY

## 2022-07-19 DEVICE — CONNECTOR G3601519 LATERAL CLOSED 19MM
Type: IMPLANTABLE DEVICE | Site: POSTERIOR CERVICAL | Status: FUNCTIONAL
Brand: INFINITY™ OCCIPITOCERVICAL UPPER THORACIC SYSTEM

## 2022-07-19 DEVICE — DBM T42275 8MMX1CMX10CM 2 EACH GRAFTON M
Type: IMPLANTABLE DEVICE | Site: POSTERIOR CERVICAL | Status: FUNCTIONAL
Brand: GRAFTON®AND GRAFTON PLUS®DEMINERALIZED BONE MATRIX (DBM)

## 2022-07-19 DEVICE — ROD 7753785 PRE-CUT 3.5MM X 85MM
Type: IMPLANTABLE DEVICE | Site: POSTERIOR CERVICAL | Status: FUNCTIONAL
Brand: VERTEX® RECONSTRUCTION SYSTEM

## 2022-07-19 RX ORDER — ONDANSETRON 2 MG/ML
4 INJECTION INTRAMUSCULAR; INTRAVENOUS EVERY 6 HOURS PRN
Status: DISCONTINUED | OUTPATIENT
Start: 2022-07-19 | End: 2022-07-23 | Stop reason: HOSPADM

## 2022-07-19 RX ORDER — MIDAZOLAM HYDROCHLORIDE 2 MG/2ML
INJECTION, SOLUTION INTRAMUSCULAR; INTRAVENOUS AS NEEDED
Status: DISCONTINUED | OUTPATIENT
Start: 2022-07-19 | End: 2022-07-19

## 2022-07-19 RX ORDER — SENNOSIDES 8.6 MG
1 TABLET ORAL DAILY
Status: DISCONTINUED | OUTPATIENT
Start: 2022-07-19 | End: 2022-07-23 | Stop reason: HOSPADM

## 2022-07-19 RX ORDER — LIDOCAINE HYDROCHLORIDE 10 MG/ML
INJECTION, SOLUTION EPIDURAL; INFILTRATION; INTRACAUDAL; PERINEURAL AS NEEDED
Status: DISCONTINUED | OUTPATIENT
Start: 2022-07-19 | End: 2022-07-19

## 2022-07-19 RX ORDER — VANCOMYCIN HYDROCHLORIDE 1 G/20ML
INJECTION, POWDER, LYOPHILIZED, FOR SOLUTION INTRAVENOUS AS NEEDED
Status: DISCONTINUED | OUTPATIENT
Start: 2022-07-19 | End: 2022-07-19 | Stop reason: HOSPADM

## 2022-07-19 RX ORDER — HYDROMORPHONE HCL/PF 1 MG/ML
0.5 SYRINGE (ML) INJECTION
Status: DISCONTINUED | OUTPATIENT
Start: 2022-07-19 | End: 2022-07-19 | Stop reason: HOSPADM

## 2022-07-19 RX ORDER — FINASTERIDE 5 MG/1
5 TABLET, FILM COATED ORAL DAILY
Status: DISCONTINUED | OUTPATIENT
Start: 2022-07-20 | End: 2022-07-23 | Stop reason: HOSPADM

## 2022-07-19 RX ORDER — HYDROMORPHONE HCL 110MG/55ML
PATIENT CONTROLLED ANALGESIA SYRINGE INTRAVENOUS AS NEEDED
Status: DISCONTINUED | OUTPATIENT
Start: 2022-07-19 | End: 2022-07-19

## 2022-07-19 RX ORDER — NEOSTIGMINE METHYLSULFATE 1 MG/ML
INJECTION INTRAVENOUS AS NEEDED
Status: DISCONTINUED | OUTPATIENT
Start: 2022-07-19 | End: 2022-07-19

## 2022-07-19 RX ORDER — FENTANYL CITRATE/PF 50 MCG/ML
25 SYRINGE (ML) INJECTION
Status: DISCONTINUED | OUTPATIENT
Start: 2022-07-19 | End: 2022-07-19 | Stop reason: HOSPADM

## 2022-07-19 RX ORDER — LIDOCAINE HYDROCHLORIDE AND EPINEPHRINE 10; 10 MG/ML; UG/ML
INJECTION, SOLUTION INFILTRATION; PERINEURAL AS NEEDED
Status: DISCONTINUED | OUTPATIENT
Start: 2022-07-19 | End: 2022-07-19 | Stop reason: HOSPADM

## 2022-07-19 RX ORDER — DOCUSATE SODIUM 100 MG/1
100 CAPSULE, LIQUID FILLED ORAL 2 TIMES DAILY
Status: DISCONTINUED | OUTPATIENT
Start: 2022-07-19 | End: 2022-07-23 | Stop reason: HOSPADM

## 2022-07-19 RX ORDER — MAGNESIUM HYDROXIDE 1200 MG/15ML
LIQUID ORAL AS NEEDED
Status: DISCONTINUED | OUTPATIENT
Start: 2022-07-19 | End: 2022-07-19 | Stop reason: HOSPADM

## 2022-07-19 RX ORDER — FENTANYL CITRATE 50 UG/ML
INJECTION, SOLUTION INTRAMUSCULAR; INTRAVENOUS AS NEEDED
Status: DISCONTINUED | OUTPATIENT
Start: 2022-07-19 | End: 2022-07-19

## 2022-07-19 RX ORDER — SODIUM CHLORIDE 9 MG/ML
INJECTION, SOLUTION INTRAVENOUS CONTINUOUS PRN
Status: DISCONTINUED | OUTPATIENT
Start: 2022-07-19 | End: 2022-07-19

## 2022-07-19 RX ORDER — CEFAZOLIN SODIUM 1 G/50ML
1000 SOLUTION INTRAVENOUS EVERY 8 HOURS
Status: COMPLETED | OUTPATIENT
Start: 2022-07-19 | End: 2022-07-19

## 2022-07-19 RX ORDER — GABAPENTIN 100 MG/1
100 CAPSULE ORAL 3 TIMES DAILY
Status: DISCONTINUED | OUTPATIENT
Start: 2022-07-19 | End: 2022-07-23 | Stop reason: HOSPADM

## 2022-07-19 RX ORDER — OXYCODONE HYDROCHLORIDE 5 MG/1
5 TABLET ORAL EVERY 4 HOURS PRN
Status: DISCONTINUED | OUTPATIENT
Start: 2022-07-19 | End: 2022-07-20

## 2022-07-19 RX ORDER — PROPOFOL 10 MG/ML
INJECTION, EMULSION INTRAVENOUS CONTINUOUS PRN
Status: DISCONTINUED | OUTPATIENT
Start: 2022-07-19 | End: 2022-07-19

## 2022-07-19 RX ORDER — HYDROMORPHONE HCL/PF 1 MG/ML
1 SYRINGE (ML) INJECTION
Status: DISCONTINUED | OUTPATIENT
Start: 2022-07-19 | End: 2022-07-19

## 2022-07-19 RX ORDER — DEXAMETHASONE SODIUM PHOSPHATE 10 MG/ML
INJECTION, SOLUTION INTRAMUSCULAR; INTRAVENOUS AS NEEDED
Status: DISCONTINUED | OUTPATIENT
Start: 2022-07-19 | End: 2022-07-19

## 2022-07-19 RX ORDER — METHOCARBAMOL 750 MG/1
750 TABLET, FILM COATED ORAL 4 TIMES DAILY PRN
Status: DISCONTINUED | OUTPATIENT
Start: 2022-07-19 | End: 2022-07-23 | Stop reason: HOSPADM

## 2022-07-19 RX ORDER — NALOXONE HYDROCHLORIDE 0.4 MG/ML
INJECTION, SOLUTION INTRAMUSCULAR; INTRAVENOUS; SUBCUTANEOUS AS NEEDED
Status: DISCONTINUED | OUTPATIENT
Start: 2022-07-19 | End: 2022-07-19

## 2022-07-19 RX ORDER — ONDANSETRON 2 MG/ML
4 INJECTION INTRAMUSCULAR; INTRAVENOUS ONCE AS NEEDED
Status: DISCONTINUED | OUTPATIENT
Start: 2022-07-19 | End: 2022-07-19 | Stop reason: HOSPADM

## 2022-07-19 RX ORDER — SODIUM CHLORIDE, SODIUM LACTATE, POTASSIUM CHLORIDE, CALCIUM CHLORIDE 600; 310; 30; 20 MG/100ML; MG/100ML; MG/100ML; MG/100ML
INJECTION, SOLUTION INTRAVENOUS CONTINUOUS PRN
Status: DISCONTINUED | OUTPATIENT
Start: 2022-07-19 | End: 2022-07-19

## 2022-07-19 RX ORDER — PANTOPRAZOLE SODIUM 40 MG/1
40 TABLET, DELAYED RELEASE ORAL
Status: DISCONTINUED | OUTPATIENT
Start: 2022-07-20 | End: 2022-07-23 | Stop reason: HOSPADM

## 2022-07-19 RX ORDER — SODIUM CHLORIDE, SODIUM LACTATE, POTASSIUM CHLORIDE, CALCIUM CHLORIDE 600; 310; 30; 20 MG/100ML; MG/100ML; MG/100ML; MG/100ML
125 INJECTION, SOLUTION INTRAVENOUS CONTINUOUS
Status: DISCONTINUED | OUTPATIENT
Start: 2022-07-19 | End: 2022-07-20

## 2022-07-19 RX ORDER — ACETAMINOPHEN 325 MG/1
975 TABLET ORAL ONCE
Status: COMPLETED | OUTPATIENT
Start: 2022-07-19 | End: 2022-07-19

## 2022-07-19 RX ORDER — OXYCODONE HYDROCHLORIDE 10 MG/1
10 TABLET ORAL EVERY 4 HOURS PRN
Status: DISCONTINUED | OUTPATIENT
Start: 2022-07-19 | End: 2022-07-20

## 2022-07-19 RX ORDER — CEFAZOLIN SODIUM 2 G/50ML
2000 SOLUTION INTRAVENOUS EVERY 8 HOURS
Status: CANCELLED | OUTPATIENT
Start: 2022-07-19 | End: 2022-07-20

## 2022-07-19 RX ORDER — ACETAMINOPHEN 325 MG/1
975 TABLET ORAL EVERY 8 HOURS SCHEDULED
Status: DISCONTINUED | OUTPATIENT
Start: 2022-07-19 | End: 2022-07-23 | Stop reason: HOSPADM

## 2022-07-19 RX ORDER — ROCURONIUM BROMIDE 10 MG/ML
INJECTION, SOLUTION INTRAVENOUS AS NEEDED
Status: DISCONTINUED | OUTPATIENT
Start: 2022-07-19 | End: 2022-07-19

## 2022-07-19 RX ORDER — PROPOFOL 10 MG/ML
INJECTION, EMULSION INTRAVENOUS AS NEEDED
Status: DISCONTINUED | OUTPATIENT
Start: 2022-07-19 | End: 2022-07-19

## 2022-07-19 RX ORDER — ONDANSETRON 2 MG/ML
INJECTION INTRAMUSCULAR; INTRAVENOUS AS NEEDED
Status: DISCONTINUED | OUTPATIENT
Start: 2022-07-19 | End: 2022-07-19

## 2022-07-19 RX ORDER — ALBUMIN, HUMAN INJ 5% 5 %
SOLUTION INTRAVENOUS CONTINUOUS PRN
Status: DISCONTINUED | OUTPATIENT
Start: 2022-07-19 | End: 2022-07-19

## 2022-07-19 RX ORDER — HEPARIN SODIUM 5000 [USP'U]/ML
5000 INJECTION, SOLUTION INTRAVENOUS; SUBCUTANEOUS EVERY 8 HOURS SCHEDULED
Status: DISCONTINUED | OUTPATIENT
Start: 2022-07-20 | End: 2022-07-23 | Stop reason: HOSPADM

## 2022-07-19 RX ORDER — HYDROMORPHONE HCL/PF 1 MG/ML
0.5 SYRINGE (ML) INJECTION
Status: DISCONTINUED | OUTPATIENT
Start: 2022-07-19 | End: 2022-07-20

## 2022-07-19 RX ORDER — BUPIVACAINE HYDROCHLORIDE AND EPINEPHRINE 5; 5 MG/ML; UG/ML
INJECTION, SOLUTION EPIDURAL; INTRACAUDAL; PERINEURAL AS NEEDED
Status: DISCONTINUED | OUTPATIENT
Start: 2022-07-19 | End: 2022-07-19 | Stop reason: HOSPADM

## 2022-07-19 RX ORDER — GLYCOPYRROLATE 0.2 MG/ML
INJECTION INTRAMUSCULAR; INTRAVENOUS AS NEEDED
Status: DISCONTINUED | OUTPATIENT
Start: 2022-07-19 | End: 2022-07-19

## 2022-07-19 RX ORDER — SUCCINYLCHOLINE/SOD CL,ISO/PF 100 MG/5ML
SYRINGE (ML) INTRAVENOUS AS NEEDED
Status: DISCONTINUED | OUTPATIENT
Start: 2022-07-19 | End: 2022-07-19

## 2022-07-19 RX ADMIN — GABAPENTIN 100 MG: 100 CAPSULE ORAL at 19:54

## 2022-07-19 RX ADMIN — PHENYLEPHRINE HYDROCHLORIDE 10 MCG/MIN: 10 INJECTION INTRAVENOUS at 08:40

## 2022-07-19 RX ADMIN — FENTANYL CITRATE 100 MCG: 50 INJECTION, SOLUTION INTRAMUSCULAR; INTRAVENOUS at 12:20

## 2022-07-19 RX ADMIN — SODIUM CHLORIDE: 0.9 INJECTION, SOLUTION INTRAVENOUS at 08:40

## 2022-07-19 RX ADMIN — PROPOFOL 50 MG: 10 INJECTION, EMULSION INTRAVENOUS at 12:45

## 2022-07-19 RX ADMIN — FENTANYL CITRATE 25 MCG: 50 INJECTION INTRAMUSCULAR; INTRAVENOUS at 14:32

## 2022-07-19 RX ADMIN — Medication 30 MG: at 08:35

## 2022-07-19 RX ADMIN — SODIUM CHLORIDE, POTASSIUM CHLORIDE, SODIUM LACTATE AND CALCIUM CHLORIDE 125 ML/HR: 600; 310; 30; 20 INJECTION, SOLUTION INTRAVENOUS at 16:04

## 2022-07-19 RX ADMIN — SODIUM CHLORIDE, SODIUM LACTATE, POTASSIUM CHLORIDE, AND CALCIUM CHLORIDE: .6; .31; .03; .02 INJECTION, SOLUTION INTRAVENOUS at 08:30

## 2022-07-19 RX ADMIN — FENTANYL CITRATE 100 MCG: 50 INJECTION, SOLUTION INTRAMUSCULAR; INTRAVENOUS at 08:35

## 2022-07-19 RX ADMIN — ONDANSETRON 4 MG: 2 INJECTION INTRAMUSCULAR; INTRAVENOUS at 09:06

## 2022-07-19 RX ADMIN — ROCURONIUM BROMIDE 30 MG: 10 SOLUTION INTRAVENOUS at 09:35

## 2022-07-19 RX ADMIN — KETAMINE HYDROCHLORIDE 0.1 MG/KG/HR: 50 INJECTION INTRAMUSCULAR; INTRAVENOUS at 10:07

## 2022-07-19 RX ADMIN — NALOXONE HYDROCHLORIDE 0.02 MG: 0.4 INJECTION, SOLUTION INTRAMUSCULAR; INTRAVENOUS; SUBCUTANEOUS at 13:18

## 2022-07-19 RX ADMIN — DEXAMETHASONE SODIUM PHOSPHATE 10 MG: 10 INJECTION, SOLUTION INTRAMUSCULAR; INTRAVENOUS at 08:35

## 2022-07-19 RX ADMIN — HYDROMORPHONE HYDROCHLORIDE 0.5 MG: 1 INJECTION, SOLUTION INTRAMUSCULAR; INTRAVENOUS; SUBCUTANEOUS at 21:46

## 2022-07-19 RX ADMIN — FENTANYL CITRATE 25 MCG: 50 INJECTION INTRAMUSCULAR; INTRAVENOUS at 14:24

## 2022-07-19 RX ADMIN — GABAPENTIN 100 MG: 100 CAPSULE ORAL at 21:46

## 2022-07-19 RX ADMIN — ACETAMINOPHEN 975 MG: 325 TABLET ORAL at 19:53

## 2022-07-19 RX ADMIN — ACETAMINOPHEN 975 MG: 325 TABLET ORAL at 07:34

## 2022-07-19 RX ADMIN — HYDROMORPHONE HYDROCHLORIDE 1 MG: 2 INJECTION INTRAMUSCULAR; INTRAVENOUS; SUBCUTANEOUS at 08:50

## 2022-07-19 RX ADMIN — NALOXONE HYDROCHLORIDE 0.02 MG: 0.4 INJECTION, SOLUTION INTRAMUSCULAR; INTRAVENOUS; SUBCUTANEOUS at 13:13

## 2022-07-19 RX ADMIN — ROCURONIUM BROMIDE 20 MG: 10 SOLUTION INTRAVENOUS at 09:45

## 2022-07-19 RX ADMIN — PROPOFOL 200 MG: 10 INJECTION, EMULSION INTRAVENOUS at 08:35

## 2022-07-19 RX ADMIN — MIDAZOLAM HYDROCHLORIDE 2 MG: 1 INJECTION, SOLUTION INTRAMUSCULAR; INTRAVENOUS at 08:30

## 2022-07-19 RX ADMIN — Medication 100 MG: at 08:36

## 2022-07-19 RX ADMIN — GLYCOPYRROLATE 0.2 MG: 0.2 INJECTION, SOLUTION INTRAMUSCULAR; INTRAVENOUS at 08:50

## 2022-07-19 RX ADMIN — NEOSTIGMINE METHYLSULFATE 5 MG: 1 INJECTION INTRAVENOUS at 10:15

## 2022-07-19 RX ADMIN — PHENYLEPHRINE HYDROCHLORIDE 20 MCG/MIN: 10 INJECTION INTRAVENOUS at 09:55

## 2022-07-19 RX ADMIN — FENTANYL CITRATE 25 MCG: 50 INJECTION INTRAMUSCULAR; INTRAVENOUS at 14:51

## 2022-07-19 RX ADMIN — OXYCODONE HYDROCHLORIDE 10 MG: 10 TABLET ORAL at 19:53

## 2022-07-19 RX ADMIN — GLYCOPYRROLATE 0.8 MG: 0.2 INJECTION, SOLUTION INTRAMUSCULAR; INTRAVENOUS at 10:15

## 2022-07-19 RX ADMIN — CEFAZOLIN SODIUM 1000 MG: 1 SOLUTION INTRAVENOUS at 17:54

## 2022-07-19 RX ADMIN — HYDROMORPHONE HYDROCHLORIDE 1 MG: 2 INJECTION INTRAMUSCULAR; INTRAVENOUS; SUBCUTANEOUS at 09:15

## 2022-07-19 RX ADMIN — DEXMEDETOMIDINE HYDROCHLORIDE 0.2 MCG/KG/HR: 100 INJECTION, SOLUTION INTRAVENOUS at 09:06

## 2022-07-19 RX ADMIN — DOCUSATE SODIUM 100 MG: 100 CAPSULE, LIQUID FILLED ORAL at 19:54

## 2022-07-19 RX ADMIN — PROPOFOL 30 MG: 10 INJECTION, EMULSION INTRAVENOUS at 12:54

## 2022-07-19 RX ADMIN — ACETAMINOPHEN 975 MG: 325 TABLET ORAL at 21:46

## 2022-07-19 RX ADMIN — LIDOCAINE HYDROCHLORIDE 100 MG: 10 INJECTION, SOLUTION EPIDURAL; INFILTRATION; INTRACAUDAL at 08:35

## 2022-07-19 RX ADMIN — FENTANYL CITRATE 100 MCG: 50 INJECTION, SOLUTION INTRAMUSCULAR; INTRAVENOUS at 09:15

## 2022-07-19 RX ADMIN — CEFAZOLIN SODIUM 1000 MG: 1 SOLUTION INTRAVENOUS at 23:28

## 2022-07-19 RX ADMIN — PROPOFOL 150 MCG/KG/MIN: 10 INJECTION, EMULSION INTRAVENOUS at 09:12

## 2022-07-19 RX ADMIN — SENNOSIDES 8.6 MG: 8.6 TABLET, FILM COATED ORAL at 19:54

## 2022-07-19 RX ADMIN — ALBUMIN HUMAN: 0.05 INJECTION, SOLUTION INTRAVENOUS at 12:00

## 2022-07-19 NOTE — ANESTHESIA PROCEDURE NOTES
Arterial Line Insertion  Performed by: Michell Spicer CRNA  Authorized by: Jing Swenson MD   Consent: Verbal consent obtained  Written consent obtained  Risks and benefits: risks, benefits and alternatives were discussed  Consent given by: patient  Patient understanding: patient states understanding of the procedure being performed  Patient consent: the patient's understanding of the procedure matches consent given  Procedure consent: procedure consent matches procedure scheduled  Relevant documents: relevant documents present and verified  Test results: test results available and properly labeled  Site marked: the operative site was marked  Radiology Images: Radiology Images displayed and confirmed  If images not available, report reviewed  Patient identity confirmed: verbally with patient, arm band, provided demographic data and hospital-assigned identification number  Time out: Immediately prior to procedure a "time out" was called to verify the correct patient, procedure, equipment, support staff and site/side marked as required  Preparation: Patient was prepped and draped in the usual sterile fashion    Indications: hemodynamic monitoring  Orientation:  Right  Location: radial artery  Procedure Details:  Federico's test normal: yes  Needle gauge: 20  Seldinger technique: Seldinger technique used  Number of attempts: 1    Post-procedure:  Post-procedure: dressing applied  Waveform: good waveform and waveform confirmed  Post-procedure CNS: unchanged  Patient tolerance: patient tolerated the procedure well with no immediate complications

## 2022-07-19 NOTE — PROGRESS NOTES
Progress Note - Neurosurgery   Guido Alford 46 y o  male MRN: [id number]  Unit/Bed#: OR POOL Encounter: [id number]    Assessment:  1  S/P#0 C2-T1 PCDF  2  Spondylosis of cervical spine with Myelopathy  3   Cervical SC compression    Plan:  · Exam-Patient awake, but groggy , but communicates well, EOMI 2 mm conj bialterally, GIRON,  and EF/EE 5/5, bilateral LEs increased tone and stiffness  DTR 3+  No clonus bialt  [place]'s test positive bilaterally  [employer] collar on  Dressing clean and dry  CEDRIC drains darkish body fluid, just emptied, some 10-15 ml in the bulb  · Imaging reviewed personally and by attending  Final results as below:  · Post op Upright Cx spine xrays in brace, ordered for tomorrow morning-pending  · Pain control:  1  Tylenol 975 mg oral Q8H, scheduled-pain  2  Gabapentin 100 mg oral 3 times daily  3  Dilaudid 1 mg IV q 3h p r n  breakthrough pain  4  Oxycodone 10 mg oral q 4h p r n  severe pain  5  Oxycodone 5 mg oral q 4h p r n  moderate pain  · Mobilize-As tolerated   · DVT PPX: SCDs, bilateral legs, consider HSQ tomorrow  · Medical Mx- BPH-Finasteride 5mg oral daily  · CEDRIC drains -immediate post emptied 20ml/2-3 hours  · Patient supine in bed, some what sleepy and groggy, drinks water, no nausea/vomiting, some incisional discomfort, otherwise denies severe post op incisional pain  Reports some change in his right arm strength  stiffness in bilateral LE also noted in the setting of myelopathy/medication effect, expecting to improve with PT/time  Dressing clean and dry, wearing [place] collar  Overall, stable post op period  Continue monitoring pain, cedric drain, OOB to recliner  Call with question or concern      Subjective/Objective   Chief Complaint: " I am doing better, not fully awake"/immediate post op progress note    Subjective: Patient reports feeling sleepy,  mild posterior cervical incisional discomfort, noticed some improvement in his right arm strength, denies nausea or vomiting  He drank water  No radicular symptoms in his UE, has also stiffness in both LEs  No fever, chills, rigors, cough or chest pain    Objective: Supine in bed , propped up, in no pain, some what groggy but communicates well, [place] on  I/O       07/17 0701  07/18 0700 07/18 0701  07/19 0700 07/19 0701 07/20 0700    I V  (mL/kg)   1900 (24 9)    IV Piggyback   250    Total Intake(mL/kg)   2150 (28 2)    Urine (mL/kg/hr)   1725    Drains   20    Total Output   1745    Net   +405                 Invasive Devices  Report    Peripheral Intravenous Line  Duration           Peripheral IV 07/19/22 Left Forearm <1 day    Peripheral IV 07/19/22 Left;Dorsal (posterior) Forearm <1 day    Peripheral IV 07/19/22 Right;Dorsal (posterior) Forearm <1 day          Arterial Line  Duration           Arterial Line 07/19/22 Right Radial <1 day          Drain  Duration           Closed/Suction Drain Posterior;Midline Neck Bulb <1 day    Urethral Catheter Non-latex 16 Fr  <1 day                Physical Exam:  Vitals: Blood pressure 122/83, pulse 62, temperature (!) 97 1 °F (36 2 °C), resp  rate (!) 0, height 6' (1 829 m), weight 76 2 kg (168 lb), SpO2 100 %  ,Body mass index is 22 78 kg/m²  Hemodynamic Monitoring: MAP: Arterial Line MAP (mmHg): 112 mmHg    General appearance: sleepy/groggy, appears stated age, cooperative and no distress  Head: Normocephalic, without obvious abnormality, atraumatic  Eyes: EOMI, 2 mm conj bilat  Neck: Dressing clean and dry, ROYAL in place, draining darkish body fluid, [place] collar on  Lungs: non labored breathing  Heart: regular heart rate  Neurologic:   Mental status: Slightly sleepy and groggy, but  Communicates well , thought content appropriate  Cranial nerves: grossly intact (Cranial nerves II-XII)  Sensory: normal to LT througout  Motor: moving all extremities without focal weakness, increased tone in both LEs  Reflexes: 3+ and symmetric  Noe Ingles  No clonus bialterally  Coordination: finger to nose normal bilaterally, no drift bilaterally      Lab Results:        Invalid input(s):  EOSPCT        Invalid input(s): LABALBU              No results found for: TROPONINT  ABG:No results found for: PHART, GRC0PLS, PO2ART, OSS2TMG, V5WNNELF, BEART, SOURCE    Imaging Studies: I have personally reviewed pertinent reports  and I have personally reviewed pertinent films in PACS    EKG, Pathology, and Other Studies: I have personally reviewed pertinent reports        VTE Pharmacologic Prophylaxis: Sequential compression device (Venodyne)     VTE Mechanical Prophylaxis: sequential compression device

## 2022-07-19 NOTE — OP NOTE
Neurosurgery Operative Room Note    uGido Alford  7/19/2022    Pre-op Diagnosis:   Other spondylosis with myelopathy, cervical region [M47 12]  Myelopathy ([address] ) [G95 9]  Spinal cord compression ([address] ) [G95 20]    Post-op Dignosis:     Post-Op Diagnosis Codes:     * Other spondylosis with myelopathy, cervical region [M47 12]     * Myelopathy ([address] ) [G95 9]     * Spinal cord compression ([address] ) [G95 20]    Procedure:  Procedure(s):  POSTERIOR CERVICAL LAMINECTOMY C3-6, BILATERAL FORAMINOTOMIES C4-5, FIXATION FUSION C2-T1    Surgeon: Surgeon(s) and Role:     * Elfego Dumas MD - Primary     * Naresh Dumont PA-C - Assisting     Anesthesia: General    Staff:   Circulator: Natalya Choudhary RN  Radiology Technologist: Cayla Gonzalez  Scrub Person: Dimple Archuleta RN  No qualified Resident was available  Estimated Blood Loss: Minimal    Specimens:                * No orders in the log *    Drains:   Closed/Suction Drain Posterior;Midline Neck Bulb (Active)       Urethral Catheter Non-latex 16 Fr  (Active)       Findings:  High grade stenosis centrally, high-grade foraminal stenosis N3-2    Complications:  None    OR note:      Details of Procedure    The patient was brought to OR and successfully induced with general endotracheal anesthesia  A radial arterial line was placed  The patient was placed in Soriano pins, and then log rolled onto chest rolls on the OR table, and the Soriano connected to the OR table  A/P and lateral fluoroscopy was used to confirm good alignment, lordosis, etc      A midline posterior incision was marked  The posterior cervical area was prepped and draped in the standard fashion  A timeout was performed  The patient received antibiotics per protocol, 10mg of Decadron, and MAPs were kept > 85 at all times       The skin was incised with a skin scalpel and dissection carried down through the midline raphe, and a subperiosteal dissection carried out over the intended posterior cervical spine  A fluorograph was taken to confirm the levels exposed prior to any bone/ligamentous removal/disruption  The medial aspect of the pars of C2 were palpated with a nerve hook  A  2 mm round ball bur was used to create an entry point in the peak of the lamina/lateral mass ridge of C2 on the right  Directing toward the medial border, using lateral fluoroscopy to assess the angle, the trauma drill with the 16 mm depth guard was used to cannulate the right pars  These  hole was sounded, found to be competent  It was tapped with a 3 0, than 3 5 mm tap, resounded, and then a 16 mm by 4 mm diameter screws were placed from the [address] [place] [address] set  I symptoms the same on the left, but despite using fluoroscopy etcetera, the channels created did not appear to be competent or fully seated within bone, as such I transition to place a left laminar screw  The ball bur was used to create an entry point on the left side of the spinous process, and then a gear shift likely probe used to cannulate the right lamina to 30 mm  This was sounded and found to be competent  I then used a 3 0 and 3 5 mm tap to prepped lamina, and then after resend in, placed a 4 0 by 28 mm screw from the left to the right  The ball bur on the [employer] Guido drill was to create entry points to the posterior cortex of the lateral masses of C3-C7 bilaterally  The lateral masses were then prepared by drilling in the typical up and out trajectory using a 14 mm depth guard  These were sounded and found to be competent  They were tapped with 3 0, then 3 5 mm taps, as the bone was particularly hard in nature    14 mm long by 3 5 mm diameter lateral mass screws were placed at these levels without difficulty  In line with the lateral mass screws, an entry point was selected at the superior portion of T1, at the C7-T1 facet  A/P fluoroscopic imaging was also used as needed   An entry point over the pedicle was started with a ball bur on the [employer] Guido drill  The Lenke probe was used to cannulate the T1 pedicle, as confirmed on lateral fluoroscopy  This was sounded, and there were no breaches in the pedicle  A 3 0, then 3 5, then 4 5 and 5 0 mm diameter tap was used to prepare the pedicle, and after sounding, a 5 5 mm diameter by 30 mm long screw was placed in the pedicle of T1  This was repeated on the opposite side without difficulty  Rods were cut and bent to fit  A 19 mm offset was used on the left to connect the michael to the laminar screw  Set screws were applied, and final tightened  Then used the [employer] Guido drill using the match stick bit to create bilateral troughs in the lamina C3-C6  Interspinous ligament between C6-7, C2-3 was removed  A sharp towel clamp was used to grasp the C6 spinous process, and the C6 lamina elevated  The ligamentum flavum was divided with Kerrison punches, and the C4-6 lamina removed lobster tail style  The C3 lamina was also then grasped and removed similarly  I white tuned the decompression, removing any medially pointing bone shards from the laminectomy  To try and minimize into the C5 palsy, I performed bilateral C4-5 foraminotomies  Used the [employer] Guido drill to remove the medial aspect of the C4-5 facet, and then carefully utilized a 2-0 curette, a 1 mm Kerrison etcetera to decompress the foramina and is exiting C5 nerve root until a nerve hook could be passed freely along the foramina  Field was copiously irrigated with antibiotic containing saline  The exposed remaining lateral mass as well as the facets were drilled out with the [address] drill  Morcelized autograft from the laminectomy was applied over the decorticating facets, corticated bone C2-T1  I also then applied graft on strips over this degenerate arthrodesis from C2-T1  1G of vancomycin powder was applied over the hardware to minimize infection risk    A 7 flat Vernon-Avila drain was placed epidural and tunneled out through separate stab incision  The deep musculature and then the fascia was closed with interrupted 0 Vicryl Plus sutures  The deep dermis was closed with inverted interrupted 2-0 Vicryl Plus sutures  The skin was closed staples  Drain secured with drain stitch  Incision was blocked with 0 5% Marcaine with epinephrine  All instrument counts, sponge counts, and needle counts were correct prior to closure the skin  Incision was dressed with Acticoat, 4x4s, and Tegaderms  The drapes were withdrawn  The [place] head ball detached from the OR table, and the patient rotated supine onto his hospital bed  [place] pins were removed, there was no bleeding from the pin sites  The patient was awoken from general endotracheal anesthesia, extubated, and taken to the PACU in cardiovascularly stable condition  No qualified resident was available  Loc Delaney PA-C, was present for the entire procedure, and provided essential assistance with with proper exposure, retraction, hemostasis, and wound closure, which was necessary secondary to the complex nature of this case           Ko Pappas MD     Date: 7/19/2022  Time: 1:11 PM

## 2022-07-19 NOTE — H&P
Neurosurgery H&P  Sandy Steven 46 y o  male MRN: [id number]        Assessment/Plan       Diagnoses and all orders for this visit:     Other spondylosis with myelopathy, cervical region  -     Case request operating room: POSTERIOR CERVICAL LAMINECTOMY C3-6, FIXATION FUSION C2-T1; Standing  -     Case request operating room: POSTERIOR CERVICAL LAMINECTOMY C3-6, FIXATION FUSION C2-T1     Myel[address] [address] )  -     Case request operating room: POSTERIOR CERVICAL LAMINECTOMY C3-6, FIXATION FUSION C2-T1; Standing  -     Case request operating room: POSTERIOR CERVICAL LAMINECTOMY C3-6, FIXATION FUSION C2-T1     Spinal cord compression ([facility] [address] )  -     Case request operating room: POSTERIOR CERVICAL LAMINECTOMY C3-6, FIXATION FUSION C2-T1; Standing  -     Case request operating room: POSTERIOR CERVICAL LAMINECTOMY C3-6, FIXATION FUSION C2-T1         Discussion:     Pain Score: 10-Worst pain ever (neck, middle to lower back (spine) to bilateral buttocks, tailbone, legs)     72-year-old male seen today in follow-up after seeing our AP, CRISELDA, yesterday in the office        He was also seen previously by Dr Kimmie Cardona, and offered surgery for cervical spine  The patient ultimately deferred on this secondary to the health concerns of his wife        He returns today, describing his worsening neurologic function  Feels his gait is very unsteady, and is dropping items with his hands  He has painful lower extremity thigh spasms  He has numbness in his bilateral lateral forearms and hands  He also mentions neck pain  He has urinary urgency  Mentions constipation, but previous occasional bowel incontinence  mJOA as below      On exam, patient is rather unsteady  He is not using any aids, but he does rely on the wheelchair in which he pushes his wife  His  is mildly weak, 4/5 bilaterally  He is pronounced patellar reflexes, as well as clonus    He is mild Lancaster's left more than right      MRI scan done yesterday, 6/6/22, shows worsening of his cervical stenosis and spinal cord compression compared to a study from last year  He has the persistent myelomalacia at C5, and high-grade compression around this level  Has very normal, adequate lordosis, with no profound anterior vector of compression  CT scan of the cervical spine also reviewed there is no evidence of OPLL  Upright x-rays also show good lordosis        I discussed with the patient the natural history of cervical myelopathy  I have reviewed with him his MRI scan and discussed the etiology of many of his symptoms  I agree with treating him with baclofen, which was prescribed yesterday, but he has not started as yet  This hopefully will help with his lower extremity symptoms i e  spasm  I discussed the expected benefit of surgical decompression i e  prevention of worsening of neurologic signs and symptoms, with the hope that he will have some mild improvement  I am recommending to him a cervical posterior decompression, with fixation fusion, further reasons labeled above  I reviewed the expected hospital course, and recovery for this procedure with the patient and his wife  I also discussed specifically the attendant risks, including but not limited to infection, bleeding, VTE, spinal fluid leak, hardware failure requiring revision, transient or permanent neurologic dysfunction, such as C5 palsy  He understands and would like to proceed  Written consent obtained  Plan:  Posterior cervical laminectomy C3-6, with fixation fusion C2-T1  I did caution him that this procedure is not particularly effective for treating neck pain, and would also likely reduce some of his range of motion  I did discuss with him he will need to wear a hard cervical collar for 6-12 weeks postop  The patient does not presently drive, so this would not impact that aspect of his functioning    However, I did caution that he likely would be out of work for up to 12 weeks      The patient does not have a PCP, but we will utilize the PAT system to proceed to clearance  Should there be any concerns with his testing, we may need to refer him to a PCP for clearance etc      06/07/22 Metrics: [id number]=4 266; VAS 50; MJOA 9/17        CHIEF COMPLAINT          Chief Complaint   Patient presents with    Follow-up       MRI CSPINE 6/6 OK PER HDM         HISTORY     History of Present Illness            46y o  year old male      HPI     See Discussion     REVIEW OF SYSTEMS     Review of Systems   Constitutional: Positive for activity change  Negative for fatigue  HENT: Negative  Negative for trouble swallowing and voice change  Eyes: Negative  Respiratory: Negative  Cardiovascular: Negative  Gastrointestinal: Positive for constipation  Negative for nausea and vomiting  Endocrine: Negative  Genitourinary: Positive for urgency (h/o bowel incontinence, nothing recently)  Negative for frequency  Musculoskeletal: Positive for back pain (presents with back brace; Mid back to lower back ), gait problem (unsteady), myalgias (muscle spasms and cramping in bi legs) and neck pain (constant left sided neck pain and radiates into the left shoulder and left arm)  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  Ibuprofen for pain - takes edge off per patient      PT - No  INJ - No     No falls     Skin: Negative  Allergic/Immunologic: Negative  Neurological: Positive for tremors (shaking of head or right leg), weakness (bi arms, frequently drops objects from bi arms) and numbness (and tingling bi arms and right leg)  Negative for dizziness, seizures, syncope and headaches  Hematological: Negative  Does not bruise/bleed easily     Psychiatric/Behavioral: Positive for sleep disturbance             Meds/Allergies      Current Medications          Current Outpatient Medications   Medication Sig Dispense Refill    baclofen 5 MG TABS Take 5 mg by mouth 3 (three) times a day 30 tablet 0    ibuprofen (MOTRIN) 800 mg tablet Take by mouth every 6 (six) hours as needed for mild pain        finasteride (PROSCAR) 5 mg tablet Take 5 mg by mouth daily (Patient not taking: No sig reported)          No current facility-administered medications for this visit             No Known Allergies     PAST HISTORY     Medical History        Past Medical History:   Diagnosis Date    Enlarged prostate              Surgical History         Past Surgical History:   Procedure Laterality Date    ABCESS DRAINAGE        KNEE ARTHROSCOPY Left       as a teenager            Social History            Tobacco Use    Smoking status: Former Smoker       Packs/day: 0 50    Smokeless tobacco: Never Used    Tobacco comment: quit 9 months ago   Vaping Use    Vaping Use: Never used   Substance Use Topics    Alcohol use: Never    Drug use: Never               Family History   Problem Relation Age of Onset    Hypertension Mother      Rheum arthritis Mother      Diabetes Father      Anxiety disorder Father              The following portions of the patient's history were reviewed in this encounter and updated as appropriate: Past medical, surgical, family, and social history, as well as medications, allergies, and review of systems            EXAM     Vitals:Blood pressure 124/80, pulse 72, temperature 98 5 °F (36 9 °C), resp  rate 16, height 6' (1 829 m), weight 76 2 kg (168 lb)  ,Body mass index is 22 78 kg/m²       Physical Exam  Vitals reviewed  Constitutional:       Appearance: Normal appearance  He is well-developed  HENT:      Head: Normocephalic and atraumatic  Eyes:      General: No scleral icterus  Cardiovascular:      Rate and Rhythm: Normal rate  Pulmonary:      Effort: Pulmonary effort is normal    Musculoskeletal:      Cervical back: Neck supple  Skin:     General: Skin is warm and dry  Neurological:      Mental Status: He is alert  Sensory: No sensory deficit  Psychiatric:         Behavior: Behavior normal          Neurologic Exam     See discussion        MEDICAL DECISION MAKING     Imaging Studies:      MRI cervical spine without contrast     Result Date: 6/7/2022  Narrative: MRI CERVICAL SPINE WITHOUT CONTRAST INDICATION: M47 12: Other spondylosis with myelopathy, cervical region  Stenosis  Surgical planning  COMPARISON:  5/14/2021 from [address]:  Sagittal T1, sagittal T2, sagittal inversion recovery, axial T2, axial  2D merge IMAGE QUALITY:  Diagnostic FINDINGS: ALIGNMENT:  Normal MARROW SIGNAL:  Scattered degenerative endplate changes mildly increased at C3-C4 and C4-C5 no mass, fracture or otherwise suspicious marrow edema    CERVICAL AND VISUALIZED THORACIC CORD:  Similar focal increased cord signal at C4-C5  Cord compression secondary to disc pathology to be described below  Otherwise preserved cord caliber  No new abnormality  PREVERTEBRAL AND PARASPINAL SOFT TISSUES:  Within normal limits  VISUALIZED POSTERIOR FOSSA:  Developmental flip cisterna magna, typically clinically silent  No acute findings  CERVICAL DISC SPACES:  Persistent multilevel mild loss of height  C2-C3:  Within normal limits  C3-C4: Similar disc bulge and left greater than right uncovertebral hypertrophy resulting in mild right and moderate left foraminal and minimal central canal narrowing  C4-C5:  Similar disc bulge and superimposed central disc protrusion resulting in marked central canal and moderate bilateral foraminal narrowing, effacement of the dorsal and ventral thecal sac with flattening of the cord  C5-C6:  Similar disc bulge and left greater than right uncovertebral hypertrophy  Moderate central canal and foraminal narrowing  C6-C7:  Similar disc bulge resulting in mild central canal and foraminal narrowing  C7-T1:  Small disc bulge without significant stenosis   UPPER THORACIC DISC SPACES:  Within normal limits       Impression: Similar appearance to prior study from 5/14/2021  Detailed description above  Multilevel degenerative disc disease resulting in predominantly mild to moderate stenoses  Detailed description above  At C4-C5, persistent marked central canal narrowing with cord compression and myelomalacia  Workstation performed: [id number]         I have personally reviewed pertinent reports  and I have personally reviewed pertinent films in PACS     Upright cervical x-rays, MRI scan from 2021, and CT cervical spine also reviewed personally         PLEASE NOTE:  This encounter may have been completed utilizing the [employer] Direct Speech Voice Recognition Software  Grammatical errors, random word insertions, pronoun errors and incomplete sentences are occasional consequences of the system due to software limitations, ambient noise and hardware issues  These may be missed by proof reading prior to affixing electronic signature   Any questions or concerns about the content, text or information contained within the body of this dictation should be directly addressed to the advanced practitioner or physician for clarification

## 2022-07-20 ENCOUNTER — TELEPHONE (OUTPATIENT)
Dept: NEUROSURGERY | Facility: CLINIC | Age: 52
End: 2022-07-20

## 2022-07-20 ENCOUNTER — APPOINTMENT (INPATIENT)
Dept: RADIOLOGY | Facility: HOSPITAL | Age: 52
DRG: 472 | End: 2022-07-20
Payer: COMMERCIAL

## 2022-07-20 LAB
ANION GAP SERPL CALCULATED.3IONS-SCNC: 5 MMOL/L (ref 4–13)
BUN SERPL-MCNC: 9 MG/DL (ref 5–25)
CALCIUM SERPL-MCNC: 9 MG/DL (ref 8.4–10.2)
CHLORIDE SERPL-SCNC: 103 MMOL/L (ref 96–108)
CO2 SERPL-SCNC: 30 MMOL/L (ref 21–32)
CREAT SERPL-MCNC: 0.93 MG/DL (ref 0.6–1.3)
ERYTHROCYTE [DISTWIDTH] IN BLOOD BY AUTOMATED COUNT: 14 % (ref 11.6–15.1)
GFR SERPL CREATININE-BSD FRML MDRD: 94 ML/MIN/1.73SQ M
GLUCOSE SERPL-MCNC: 112 MG/DL (ref 65–140)
GLUCOSE SERPL-MCNC: 115 MG/DL (ref 65–140)
HCT VFR BLD AUTO: 40.2 % (ref 36.5–49.3)
HGB BLD-MCNC: 13.1 G/DL (ref 12–17)
MCH RBC QN AUTO: 29.2 PG (ref 26.8–34.3)
MCHC RBC AUTO-ENTMCNC: 32.6 G/DL (ref 31.4–37.4)
MCV RBC AUTO: 90 FL (ref 82–98)
PLATELET # BLD AUTO: 278 THOUSANDS/UL (ref 149–390)
PMV BLD AUTO: 8.5 FL (ref 8.9–12.7)
POTASSIUM SERPL-SCNC: 3.8 MMOL/L (ref 3.5–5.3)
RBC # BLD AUTO: 4.49 MILLION/UL (ref 3.88–5.62)
SODIUM SERPL-SCNC: 138 MMOL/L (ref 135–147)
WBC # BLD AUTO: 13.15 THOUSAND/UL (ref 4.31–10.16)

## 2022-07-20 PROCEDURE — 85027 COMPLETE CBC AUTOMATED: CPT | Performed by: PHYSICIAN ASSISTANT

## 2022-07-20 PROCEDURE — 72040 X-RAY EXAM NECK SPINE 2-3 VW: CPT

## 2022-07-20 PROCEDURE — 82948 REAGENT STRIP/BLOOD GLUCOSE: CPT

## 2022-07-20 PROCEDURE — 99024 POSTOP FOLLOW-UP VISIT: CPT | Performed by: PHYSICIAN ASSISTANT

## 2022-07-20 PROCEDURE — 97163 PT EVAL HIGH COMPLEX 45 MIN: CPT

## 2022-07-20 PROCEDURE — 97535 SELF CARE MNGMENT TRAINING: CPT

## 2022-07-20 PROCEDURE — 80048 BASIC METABOLIC PNL TOTAL CA: CPT | Performed by: PHYSICIAN ASSISTANT

## 2022-07-20 PROCEDURE — 97166 OT EVAL MOD COMPLEX 45 MIN: CPT

## 2022-07-20 RX ORDER — CALCIUM CARBONATE 200(500)MG
500 TABLET,CHEWABLE ORAL 2 TIMES DAILY PRN
Status: DISCONTINUED | OUTPATIENT
Start: 2022-07-20 | End: 2022-07-23 | Stop reason: HOSPADM

## 2022-07-20 RX ORDER — OXYCODONE HYDROCHLORIDE 5 MG/1
2.5 TABLET ORAL EVERY 4 HOURS PRN
Status: DISCONTINUED | OUTPATIENT
Start: 2022-07-20 | End: 2022-07-23 | Stop reason: HOSPADM

## 2022-07-20 RX ORDER — DIAZEPAM 5 MG/ML
2.5 INJECTION, SOLUTION INTRAMUSCULAR; INTRAVENOUS EVERY 8 HOURS PRN
Status: DISCONTINUED | OUTPATIENT
Start: 2022-07-20 | End: 2022-07-23 | Stop reason: HOSPADM

## 2022-07-20 RX ORDER — OXYCODONE HYDROCHLORIDE 5 MG/1
5 TABLET ORAL EVERY 4 HOURS PRN
Status: DISCONTINUED | OUTPATIENT
Start: 2022-07-20 | End: 2022-07-23 | Stop reason: HOSPADM

## 2022-07-20 RX ADMIN — OXYCODONE HYDROCHLORIDE 2.5 MG: 5 TABLET ORAL at 19:37

## 2022-07-20 RX ADMIN — METHOCARBAMOL 750 MG: 750 TABLET ORAL at 21:54

## 2022-07-20 RX ADMIN — SODIUM CHLORIDE, POTASSIUM CHLORIDE, SODIUM LACTATE AND CALCIUM CHLORIDE 125 ML/HR: 600; 310; 30; 20 INJECTION, SOLUTION INTRAVENOUS at 00:50

## 2022-07-20 RX ADMIN — FINASTERIDE 5 MG: 5 TABLET, FILM COATED ORAL at 09:33

## 2022-07-20 RX ADMIN — DIAZEPAM 2.5 MG: 10 INJECTION, SOLUTION INTRAMUSCULAR; INTRAVENOUS at 16:40

## 2022-07-20 RX ADMIN — HEPARIN SODIUM 5000 UNITS: 5000 INJECTION INTRAVENOUS; SUBCUTANEOUS at 21:54

## 2022-07-20 RX ADMIN — METHOCARBAMOL 750 MG: 750 TABLET ORAL at 02:19

## 2022-07-20 RX ADMIN — OXYCODONE HYDROCHLORIDE 10 MG: 10 TABLET ORAL at 02:15

## 2022-07-20 RX ADMIN — GABAPENTIN 100 MG: 100 CAPSULE ORAL at 21:54

## 2022-07-20 RX ADMIN — OXYCODONE HYDROCHLORIDE 5 MG: 5 TABLET ORAL at 12:52

## 2022-07-20 RX ADMIN — DOCUSATE SODIUM 100 MG: 100 CAPSULE, LIQUID FILLED ORAL at 18:25

## 2022-07-20 RX ADMIN — DOCUSATE SODIUM 100 MG: 100 CAPSULE, LIQUID FILLED ORAL at 09:33

## 2022-07-20 RX ADMIN — METHOCARBAMOL 750 MG: 750 TABLET ORAL at 18:25

## 2022-07-20 RX ADMIN — SODIUM CHLORIDE, POTASSIUM CHLORIDE, SODIUM LACTATE AND CALCIUM CHLORIDE 125 ML/HR: 600; 310; 30; 20 INJECTION, SOLUTION INTRAVENOUS at 09:34

## 2022-07-20 RX ADMIN — ACETAMINOPHEN 975 MG: 325 TABLET ORAL at 21:54

## 2022-07-20 RX ADMIN — PANTOPRAZOLE SODIUM 40 MG: 40 TABLET, DELAYED RELEASE ORAL at 05:23

## 2022-07-20 RX ADMIN — ACETAMINOPHEN 975 MG: 325 TABLET ORAL at 05:23

## 2022-07-20 RX ADMIN — ONDANSETRON 4 MG: 2 INJECTION INTRAMUSCULAR; INTRAVENOUS at 09:33

## 2022-07-20 RX ADMIN — SENNOSIDES 8.6 MG: 8.6 TABLET, FILM COATED ORAL at 09:33

## 2022-07-20 RX ADMIN — GABAPENTIN 100 MG: 100 CAPSULE ORAL at 18:25

## 2022-07-20 NOTE — PLAN OF CARE
Problem: OCCUPATIONAL THERAPY ADULT  Goal: Performs self-care activities at highest level of function for planned discharge setting  See evaluation for individualized goals  Description: Treatment Interventions: ADL retraining, Functional transfer training, Endurance training, Patient/family training, Equipment evaluation/education, Compensatory technique education, Energy conservation, Activityengagement  Equipment Recommended: Shower/Tub chair with back ($)       See flowsheet documentation for full assessment, interventions and recommendations  Note: Limitation: Decreased ADL status, Decreased UE strength, Decreased endurance, Decreased self-care trans, Decreased high-level ADLs     Assessment: Pt is a 47 yo male [facility] on 7/19/22  Pt presents s/p elective posterior cervical laminectomy C3-C6, Bilateral foraminotomies C4-C5, fixation/ fusion C2-T1 on 7/19/22  Per neurosurgery recommend [place] collar at all times and [place] collar for bathing  Pt w/ active OT orders and activity orders  Significant PMH impacting his occupational performance includes L knee arthroscopy, cervical spondylosis w/ myelopathy, lumbar radiculopathy, cord compression  Personal factors impacting performance includes increased pain, difficulty completing IADLs  Pt reports living w/ his girlfriend in one floor environment w/ 10 steps to enter  Pt reports I w/ ADL / IADL w/ out use of AD but does not drive  Upon eval, pt demonstrated limited participation in ADLs OOB despite max encouragement / educated due to reported nausea and not feeling well  Pt able to long sit in bed w/ mod I  Pt verbalized understanding of spinal precautions  Pt demonstrated B UE strength WFL to complete ADLs and reports R hand dominance  Pt completed grooming and feeding w/ mod I after set- up   Pt presents w/ activity restrictions, increased pain, decreased activity tolerance, decreased endurance, decreased standing tolerance impacting his I w/ dressing, bathing, functional mobility, functional transfers, activity engagement, clothing mgmt, community mobility, food prep / clean up  Pt completing ADL below baseline level of I and would benefit from OT while in acute care to address deficits  Anticipate DC home w/ sig other support / assist as needed when medically stable w/ PT services when cleared by MD pend improved activity tolerance/ medical optimization   Will continue to follow     OT Discharge Recommendation:  (anticipate DC home w/ support / assist as needed and PT services as appropriate per MD)

## 2022-07-20 NOTE — TELEPHONE ENCOUNTER
7/25/22:   DISCHARGED HOME    7/22/22: INPATIENT    7/21/22: INPATIENT    7/20/22: INPATEINT    2 WK POV W/ NURSE  8/3/22 / 1:00 / Torrie Haro    6 WK POV W/ MOULDING  9/9/22 / 1:00 / Torrie Haro    IMAGING: NONE

## 2022-07-20 NOTE — PLAN OF CARE
Problem: PAIN - ADULT  Goal: Verbalizes/displays adequate comfort level or baseline comfort level  Description: Interventions:  - Encourage patient to monitor pain and request assistance  - Assess pain using appropriate pain scale  - Administer analgesics based on type and severity of pain and evaluate response  - Implement non-pharmacological measures as appropriate and evaluate response  - Consider cultural and social influences on pain and pain management  - Notify physician/advanced practitioner if interventions unsuccessful or patient reports new pain  Outcome: Progressing     Problem: SAFETY ADULT  Goal: Patient will remain free of falls  Description: INTERVENTIONS:  - Educate patient/family on patient safety including physical limitations  - Instruct patient to call for assistance with activity   - Consult OT/PT to assist with strengthening/mobility   - Keep Call bell within reach  - Keep bed low and locked with side rails adjusted as appropriate  - Keep care items and personal belongings within reach  - Initiate and maintain comfort rounds  - Make Fall Risk Sign visible to staff  - Offer Toileting every 2 Hours, in advance of need  - Initiate/Maintainalarm  - Obtain necessary fall risk management equipment  - Apply yellow socks and bracelet for high fall risk patients  - Consider moving patient to room near nurses station  Outcome: Progressing  Goal: Maintain or return to baseline ADL function  Description: INTERVENTIONS:  -  Assess patient's ability to carry out ADLs; assess patient's baseline for ADL function and identify physical deficits which impact ability to perform ADLs (bathing, care of mouth/teeth, toileting, grooming, dressing, etc )  - Assess/evaluate cause of self-care deficits   - Assess range of motion  - Assess patient's mobility; develop plan if impaired  - Assess patient's need for assistive devices and provide as appropriate  - Encourage maximum independence but intervene and supervise when necessary  - Involve family in performance of ADLs  - Assess for home care needs following discharge   - Consider OT consult to assist with ADL evaluation and planning for discharge  - Provide patient education as appropriate  Outcome: Progressing  Goal: Maintains/Returns to pre admission functional level  Description: INTERVENTIONS:  - Perform BMAT or MOVE assessment daily    - Set and communicate daily mobility goal to care team and patient/family/caregiver  - Collaborate with rehabilitation services on mobility goals if consulted  - Perform Range of Motion 3 times a day  - Reposition patient every 2 hours  - Dangle patient 3 times a day  - Stand patient 3 times a day  - Ambulate patient 3 times a day  - Out of bed to chair 3 times a day   - Out of bed for meals 3 times a day  - Out of bed for toileting  - Record patient progress and toleration of activity level   Outcome: Progressing     Problem: DISCHARGE PLANNING  Goal: Discharge to home or other facility with appropriate resources  Description: INTERVENTIONS:  - Identify barriers to discharge w/patient and caregiver  - Arrange for needed discharge resources and transportation as appropriate  - Identify discharge learning needs (meds, wound care, etc )  - Arrange for interpretive services to assist at discharge as needed  - Refer to Case Management Department for coordinating discharge planning if the patient needs post-hospital services based on physician/advanced practitioner order or complex needs related to functional status, cognitive ability, or social support system  Outcome: Progressing     Problem: Knowledge Deficit  Goal: Patient/family/caregiver demonstrates understanding of disease process, treatment plan, medications, and discharge instructions  Description: Complete learning assessment and assess knowledge base    Interventions:  - Provide teaching at level of understanding  - Provide teaching via preferred learning methods  Outcome: Progressing

## 2022-07-20 NOTE — OCCUPATIONAL THERAPY NOTE
Occupational Therapy Evaluation     Patient Name: Joyce Zee  OFCPP'F Date: 7/20/2022  Problem List  Principal Problem:    Other spondylosis with myelopathy, cervical region    Past Medical History  Past Medical History:   Diagnosis Date    Enlarged prostate      Past Surgical History  Past Surgical History:   Procedure Laterality Date    ABCESS DRAINAGE      KNEE ARTHROSCOPY Left     as a teenager           07/20/22 0913   OT Last Visit   OT Visit Date 07/20/22  (Wednesday)   Note Type   Note type Evaluation   Restrictions/Precautions   Weight Bearing Precautions Per Order No   Braces or Orthoses Other (Comment);C/S Collar  ([place] at all times to [place] collar for bathing)   Other Precautions Bed Alarm;Multiple lines;Spinal precautions;Pain; Fall Risk  (catheter)   Pain Assessment   Pain Assessment Tool 0-10   Pain Score 7   Pain Location/Orientation Orientation: Bilateral;Location: Shoulder; Location: Neck   Effect of Pain on Daily Activities limits activity tolerance and I w/ ADLs   Patient's Stated Pain Goal No pain   Hospital Pain Intervention(s) Repositioned; Emotional support   Home Living   Type of Home House  (has to take laundry out)   [address] level;Performs ADLs on one level; Able to live on main level with bedroom/bathroom  (10 ALO w/ L rail acending)   Bathroom Shower/Tub Tub/shower unit   Bathroom Toilet Standard   Bathroom Equipment Shower chair;Grab bars in shower;Grab bars around [place] Grab bars; Other (Comment)  (no AD)   Additional Comments Pt reports living w/ his girlfriend in one floor environment w/ 10 ALO   Prior Function   Level of [place] Independent with ADLs and functional mobility   Lives With Significant other  (girlfriend (recently had hip surgery))   Receives Help From   (shares household responsibilities w/ girlfriend)   ADL Assistance Independent   IADLs Independent  (does not drive; gets a ride)   Falls in the last 6 months 1 to 4  ("I fell a few times (<5), and they were not serios falls")   Vocational Full time employment   Lifestyle   Reciprocal Relationships Pt reports living w/ his girlfriend   Service to Others Pt reports  at [employer] and has to lift equipment, get on and off the floor, under equipment   Intrinsic Gratification Pt reports he enjoys taking walks and going to the park  Subjective   Subjective "I am not feeling well this morning  I am nauseaous"   ADL   Where Assessed Supine, bed  (HOB elevated)   Eating Assistance 6  Modified independent   Eating Deficit Setup   Grooming Assistance 6  Modified Independent   Grooming Deficit Setup  (supine HOB elevated)   UB Bathing Assistance Unable to assess  (pt declined despite education / encouragement)   LB Bathing Assistance Unable to assess   UB Dressing Assistance 4  Minimal Assistance   UB Dressing Deficit Pull around back; Fasteners;Setup;Verbal cueing; Increased time to complete   LB Dressing Assistance Unable to assess  (pt declined)   Toileting Assistance    (catheter in place)   Additional Comments Pt reports he sat OOB in the chair earlier after returning from xray to eat his breakfast and then returned to bed due to nausea  Pt declined sitting at EOB, functional mobility or OOB despite max encouragement / education  Bed Mobility   Supine to Sit Unable to assess   Sit to Supine Unable to assess   Additional Comments Pt declined EOB or OOB despite max encouragement  Pt completed bed mobility supine to long w/ mod I for + time grasping bed rails for therapist to scratch his back   Transfers   Sit to Stand Unable to assess  (RN, Augusta Sandoval reports A x1)   Stand to Sit Unable to assess   Additional Comments Pt declined and stated that he is not getting up or OOB until he rests / sleeps to get that dilaudid out of his system   Functional Mobility   Additional Comments NT   PT declined despite max encouragement   Activity Tolerance   Activity Tolerance Patient limited by fatigue;Patient limited by pain; Other (Comment)  (pt declined / refused despite max encouragement)   Medical Staff Made Aware spoke to PT, Clifton Rossi / Nacho Neumann   Nurse Made Aware per Lovely SANTOS appropriate to see pt   RUE Assessment   RUE Assessment   (R hand dominance; limited end / terminal ROM due to spinal precautions and cervical spine precautions)   RUE Strength   RUE Overall Strength Within Functional Limits - able to perform ADL tasks with strength  (grasp stength 4+/5)   LUE Assessment   LUE Assessment   (able to particiapte in ADLs; limited end / terminal ROM due to spinal precautions)   LUE Strength   LUE Overall Strength Within Functional Limits - able to perform ADL tasks with strength  (grasp strength 4+/5)   Hand Function   Gross Motor Coordination Functional   Fine Motor Coordination Functional   Sensation   Light Touch No apparent deficits  (B UE to light touch)   Sharp/Dull Not tested   Cognition   Overall Cognitive Status [facility]   Arousal/Participation Alert   Attention Within functional limits   Orientation Level Oriented X4   Memory Within functional limits   Following Commands Follows multistep commands with increased time or repetition   Comments Identified pt by full name and birthdate  Alert, oriented and able to participate in conversation and communicate wants / needs  Declined OOB and demonstrated limited active participation despite encouragement /education  Lovely SANTOS aware   Assessment   Limitation Decreased ADL status; Decreased UE strength;Decreased endurance;Decreased self-care trans;Decreased high-level ADLs   Assessment Pt is a 45 yo male [facility] on 7/19/22  Pt presents s/p elective posterior cervical laminectomy C3-C6, Bilateral foraminotomies C4-C5, fixation/ fusion C2-T1 on 7/19/22  Per neurosurgery recommend [place] collar at all times and [place] collar for bathing  Pt w/ active OT orders and activity orders   Significant PMH impacting his occupational performance includes L knee arthroscopy, cervical spondylosis w/ myelopathy, lumbar radiculopathy, cord compression  Personal factors impacting performance includes increased pain, difficulty completing IADLs  Pt reports living w/ his girlfriend in one floor environment w/ 10 steps to enter  Pt reports I w/ ADL / IADL w/ out use of AD but does not drive  Upon eval, pt demonstrated limited participation in ADLs OOB despite max encouragement / educated due to reported nausea and not feeling well  Pt able to long sit in bed w/ mod I  Pt verbalized understanding of spinal precautions  Pt demonstrated B UE strength WFL to complete ADLs and reports R hand dominance  Pt completed grooming and feeding w/ mod I after set- up  Pt presents w/ activity restrictions, increased pain, decreased activity tolerance, decreased endurance, decreased standing tolerance impacting his I w/ dressing, bathing, functional mobility, functional transfers, activity engagement, clothing mgmt, community mobility, food prep / clean up  Pt completing ADL below baseline level of I and would benefit from OT while in acute care to address deficits  Anticipate DC home w/ sig other support / assist as needed when medically stable w/ PT services when cleared by MD pend improved activity tolerance/ medical optimization  Will continue to follow   Goals   Patient Goals Pt stated that he wants to sleep right now and get this out of his system  Pt added that I would get up and do cartwheels if I felt better  Plan   Treatment Interventions ADL retraining;Functional transfer training; Endurance training;Patient/family training;Equipment evaluation/education; Compensatory technique education; Energy conservation; Activityengagement   Goal Expiration Date 07/27/22   OT Frequency 3-5x/wk   Recommendation   OT Discharge Recommendation   (anticipate DC home w/ support / assist as needed and PT services as appropriate per MD)   Equipment Recommended Shower/Tub chair with back ($) AM-PAC Daily Activity Inpatient   Lower Body Dressing 3   Bathing 2   Toileting 3   Upper Body Dressing 3   Grooming 4   Eating 4   Daily Activity Raw Score 19   Daily Activity Standardized Score (Calc for Raw Score >=11) 40 22   AM-PAC Applied Cognition Inpatient   Following a Speech/Presentation 4   Understanding Ordinary Conversation 4   Taking Medications 4   Remembering Where Things Are Placed or Put Away 4   Remembering List of 4-5 Errands 4   Taking Care of Complicated Tasks 4   Applied Cognition Raw Score 24   Applied Cognition Standardized Score 62 21   Barthel Index   Feeding 10   Bathing 0   Grooming Score 5   Dressing Score 5   Bladder Score 0   Bowels Score 10   Toilet Use Score 5   Transfers (Bed/Chair) Score 10   Mobility (Level Surface) Score 0   Stairs Score 0   Barthel Index Score 45   Modified [place] Scale   Modified [place] Scale 3     The patient's raw score on the AM-PAC Daily Activity inpatient short form is 19, standardized score is 40 22, greater than 39 4  Patients at this level are likely to benefit from discharge to home  Please refer to the recommendation of the Occupational Therapist for safe discharge planning      Pt goals to be met by 7/27/22:  -Pt will demonstrate good attention and understanding spinal precautions during ADLs to max I and improve engagement to return home    -Pt will consistently don / Jordan Guest <> [place] w/ min A to max I w/ bathing    -Pt will complete bed mobility w/ mod I demonstrating good recall spinal precautions    -Pt will complete UBD/ LBD w/ S after set- up    -Pt will consistently complete functional transfers to all surfaces w/ mod I to max I w/ ADLs    -Pt will demonstrate improved activity tolerance and sitting tolerance OOB In chair for all meals    -Pt will demonstrate improved functional standing tolerance for at least 10 minutes w/ at least fair + balance to max I w/ ADLs    Michelle Cobb, OTR/L

## 2022-07-20 NOTE — PROGRESS NOTES
Progress Note - Neurosurgery   Jef Sorto 46 y o  male MRN: [id number]  Unit/Bed#: S -01 Encounter: [id number]      Assessment:  1  S/P#1 C2-T1 PCDF  2  Spondylosis of cervical spine with Myelopathy  3  Cervical SC compression     Plan:  · Exam:  A&OX3, GIRON: Right deltoid 4-/5,  5/5 bilaterally, IO 5/5 bilat, EF/EE 5/5 bilat, LE DF/PF 5/5 bilat, Sensation to LT intact bilaterally  increased tonicity  stifficness in the lower limbs, but able to move and flex his knees  DTR 3+ no clonus bilaterally  Lancaster's positive in both hands  Dressing clean and dry  ROYAL drain in place, BR=[id number]+29=701ef/24H darkish body fluid  [place] collar on  · Imaging reviewed personally and by attending  Final results as below:  ? Post op Upright Cx spine xrays in brace shows post op changes with good construction and stable hardware  · Pain control:  1  Tylenol 975 mg oral Q8H, scheduled-pain  2  Gabapentin 100 mg oral 3 times daily  3  D/C Dilaudid and IV fluid  4  Oxycodone 5 mg oral q 4h p r n  severe pain  5  Oxycodone 2 5 mg oral q 4h p r n  moderate pain  6  Valium 5mg IV Q8H, PRN  · Mobilize-As tolerated   · DVT PPX: SCDs, bilateral legs, consider HSQ tomorrow  · Medical Mx- BPH-Finasteride 5mg oral daily, remove Wesley cath  · BP improved  · BPH on Finasteride, 5 mg oral /d  · ROYAL drains =[id number] monitoring  · Patient is feeling better since last night shaky/tremors reaction to Dilaudid  D/C Dilaudid  Okay with other meds  He noticed improvement with his upper limbs  and elbow flexion and extension, weakness in right  Deltoid  Dressing clean and dry, ROYAL drain in place drains darkish fluid, [place] collar on  Take Wesley out and monitor his UO  Continue follow up as a primary, pain control ( patient is okay wo dilaudid) with other pain meds  Encourage OOB to recliner  PT/OT  Kinsey Potts all the time  Call with questions or concerns          Subjective/Objective   Chief Complaint: " I am feeling better this afternoon" 1st post op day  Subjective:     Patient had adverse reaction to Dilaudid with tremors and shaky bilateral LEs, with stiffness and difficulty moving his limbs  He says his strength coming back in the hands and fingers, resolved numbness in his fingers tips  He reports incisional pain is tolerable for now, denies fever, chills, rigors , cough or chest pain  Appetite is good, some epigastric discomfort due to pain meds, but no N/V  He is taking pantoprazole and Tums  Patient wearing [employer]  Denies any drainage from the incision site  Objective:  Patient supine in bed, probed up communicates well and in no pain distress    I/O       07/18 0701 07/19 0700 07/19 0701 07/20 0700    P  O   480    I V  (mL/kg)  1900 (25 3)    IV Piggyback  250    Total Intake(mL/kg)  2630 (35)    Urine (mL/kg/hr)  2475    Drains  170    Total Output  2645    Net  -15                Invasive Devices  Report    Peripheral Intravenous Line  Duration           Peripheral IV Left;Proximal;Ventral (anterior) Forearm -- days    Peripheral IV 07/19/22 Right;Ventral (anterior) Forearm 1 day    Peripheral IV 07/19/22 Left Forearm <1 day          Drain  Duration           Closed/Suction Drain Posterior;Midline Neck Bulb <1 day    Urethral Catheter Non-latex 16 Fr  <1 day                Physical Exam:  Vitals: Blood pressure 143/82, pulse 67, temperature 97 7 °F (36 5 °C), temperature source Oral, resp  rate 18, height 6' (1 829 m), weight 75 2 kg (165 lb 12 6 oz), SpO2 97 %  ,Body mass index is 22 48 kg/m²  General appearance: alert, appears stated age, cooperative and no distress  Head: Normocephalic, without obvious abnormality, atraumatic  Eyes: EOMI,2 mm conj bilat    Neck: Dressing clean and dry, ROYAL drain in place, OB=768UQ/24h  Back: no kyphosis present, no tenderness to percussion or palpation  Lungs: non labored breathing  Heart: regular heart rate  Neurologic:   Mental status: Alert, oriented X3, thought content appropriate  Cranial nerves: grossly intact (Cranial nerves II-XII)  Sensory: normal to LT bilat  Motor: exam-see exam above  Reflexes: 3+ and symmetric  No clonus bilat  Coordination:Difficulty on the right side to do finger to nose and much of stretching, left arm no dysmetria of pronator drift      Lab Results:  Results from last 7 days   Lab Units 07/19/22  1836   PLATELETS Thousands/uL 305           Invalid input(s): LABALBU              No results found for: TROPONINT  ABG:No results found for: PHART, MUW2HTV, PO2ART, PYI1YFK, Q2XLAUUK, BEART, SOURCE    Imaging Studies: I have personally reviewed pertinent reports  and I have personally reviewed pertinent films in PACS    EKG, Pathology, and Other Studies: I have personally reviewed pertinent reports        VTE Pharmacologic Prophylaxis: Heparin SQ    VTE Mechanical Prophylaxis: sequential compression device

## 2022-07-20 NOTE — ANESTHESIA POSTPROCEDURE EVALUATION
Post-Op Assessment Note             Reason for prolonged intubation > 24 hours:  Pt was extubated at end of caseReason for prolonged intubation > 48 hours:  Pt was extubated at end of case      No complications documented      BP      Temp      Pulse     Resp      SpO2

## 2022-07-20 NOTE — UTILIZATION REVIEW
Initial Clinical Review    Elective Inpatient surgical procedure  Age/Sex: 46 y o  male  Surgery Date: 7/19/22 @0942  Procedure: POSTERIOR CERVICAL LAMINECTOMY C3-6, BILATERAL FORAMINOTOMIES C4-5, FIXATION FUSION C2-T1  Anesthesia: general  Operative Findings:  High grade stenosis centrally, high-grade foraminal stenosis C4-5     POD#1 Progress Note:   Pt had tremor like movements/stiffness , difficulty getting OOB overnight, pt anxious over symptoms and BP elevated to 160/100 then down to 140/80-  possibly 2/2 IV Dilaudid vs myelopathy symptoms aggravated by surgery  Pt ordered IV valium prn, monitoring BP, hold off on dilaudid for now  -neurosx Pt feels better since tremors/shaking overnight- Pt noticed improvement with his upper limbs  and elbow flexion and extension, weakness in right deltoid  Resolved numbness in his fingers tips  A&OX3, GIRON: Right deltoid 4-/5,  5/5 bilaterally, IO 5/5 bilat, EF/EE 5/5 bilat, LE DF/PF 5/5 bilat, Sensation to LT intact bilaterally  Increased tonicity/stifficness in the lower limbs, but able to move and flex his knees  DTR 3+ no clonus bilaterally  Lancaster's positive in both hands  Dressing clean and dry  ROYAL drain in place for 220ml/ 24h darkish body fluid  [place] collar on  Pt reports incisional pain and some epigastric discomfort from pain meds   Post op Upright Cx spine xrays in brace shows post op changes with good construction and stable hardware  Plan d/c IVF and Dilaudid  D/C Wesley, monitor UOP  OOB to recliner, PT/OT           Admission Orders: Date/Time/Statement:   Admission Orders (From admission, onward)     Ordered        07/19/22 1554  Inpatient Admission  Once                      Orders Placed This Encounter   Procedures    Inpatient Admission     Standing Status:   Standing     Number of Occurrences:   1     Order Specific Question:   Level of Care     Answer:   Med Surg [16]     Order Specific Question:   Estimated length of stay     Answer: More than 2 Midnights     Order Specific Question:   Certification     Answer:   I certify that inpatient services are medically necessary for this patient for a duration of greater than two midnights  See H&P and MD Progress Notes for additional information about the patient's course of treatment  Vital Signs: /80   Pulse 74   Temp 97 7 °F (36 5 °C) (Oral)   Resp 18   Ht 6' (1 829 m)   Wt 75 2 kg (165 lb 12 6 oz)   SpO2 96%   BMI 22 48 kg/m²     Pertinent Labs/Diagnostic Test Results:   XR cervical spine 2 or 3 views   Final Result by Mamta Farmer MD (07/20 9830)      Postsurgical changes as above  Workstation performed: [id number]         XR spine cervical 2 or 3 vw injury   Final Result by Kiana Hutchinson MD (07/19 4388)      Fluoroscopic guidance provided for procedure guidance  Please refer to the separate procedure notes for additional details            Workstation performed: [id number]               Results from last 7 days   Lab Units 07/20/22  0804 07/19/22  1836   WBC Thousand/uL 13 15*  --    HEMOGLOBIN g/dL 13 1  --    HEMATOCRIT % 40 2  --    PLATELETS Thousands/uL 278 305                 Results from last 7 days   Lab Units 07/20/22  0239   POC GLUCOSE mg/dl 115                 Diet: reg  Mobility: OOB TID  DVT Prophylaxis: SCD, ambulation , heparin    Medications/Pain Control:   Scheduled Medications:  acetaminophen, 975 mg, Oral, Q8H LESA  docusate sodium, 100 mg, Oral, BID  finasteride, 5 mg, Oral, Daily  gabapentin, 100 mg, Oral, TID  heparin (porcine), 5,000 Units, Subcutaneous, Q8H LESA  pantoprazole, 40 mg, Oral, Early Morning  senna, 1 tablet, Oral, Daily    ceFAZolin (ANCEF) IVPB (premix in dextrose) 1,000 mg 50 mL  Dose: 1,000 mg  Freq: Every 8 hours Route: IV  Last Dose: 1,000 mg (07/19/22 2328)  Start: 07/19/22 1600 End: 07/19/22 2358  Continuous IV Infusions:  lactated ringers, 125 mL/hr, Intravenous, ContinuousEnd: 07/20/22 1257      PRN Meds:  diazepam, 2 5 mg, Intravenous, Q8H PRN  HYDROmorphone, 0 5 mg, Intravenous, Q3H PRN x1 7/19 @2146End: 07/20/22 1257  methocarbamol, 750 mg, Oral, 4x Daily PRN x1 7/20  ondansetron, 4 mg, Intravenous, Q6H PRN x1 7/20  oxyCODONE, 10 mg, Oral, Q4H PRN x1 7/19, x1 7/20  oxyCODONE, 5 mg, Oral, Q4H PRN x1 7/20        Network Utilization Review Department  ATTENTION: Please call with any questions or concerns to [phone] and carefully listen to the prompts so that you are directed to the right person  All voicemails are confidential   Felipe Jackson all requests for admission clinical reviews, approved or denied determinations and any other requests to dedicated fax number below belonging to the campus where the patient is receiving treatment   List of dedicated fax numbers for the Facilities:  [address] DENIALS (Administrative/Medical Necessity) [phone]   [address] (Maternity/NICU/Pediatrics) [phone]   [address]  [address] [facility] [place] [address] [id number] [address] [place] [address] [address] [place] [address] [phone]

## 2022-07-20 NOTE — PLAN OF CARE
Problem: PAIN - ADULT  Goal: Verbalizes/displays adequate comfort level or baseline comfort level  Description: Interventions:  - Encourage patient to monitor pain and request assistance  - Assess pain using appropriate pain scale  - Administer analgesics based on type and severity of pain and evaluate response  - Implement non-pharmacological measures as appropriate and evaluate response  - Consider cultural and social influences on pain and pain management  - Notify physician/advanced practitioner if interventions unsuccessful or patient reports new pain  Outcome: Progressing     Problem: INFECTION - ADULT  Goal: Absence of fever/infection during neutropenic period  Description: INTERVENTIONS:  - Monitor WBC    Outcome: Progressing     Problem: SAFETY ADULT  Goal: Maintain or return to baseline ADL function  Description: INTERVENTIONS:  -  Assess patient's ability to carry out ADLs; assess patient's baseline for ADL function and identify physical deficits which impact ability to perform ADLs (bathing, care of mouth/teeth, toileting, grooming, dressing, etc )  - Assess/evaluate cause of self-care deficits   - Assess range of motion  - Assess patient's mobility; develop plan if impaired  - Assess patient's need for assistive devices and provide as appropriate  - Encourage maximum independence but intervene and supervise when necessary  - Involve family in performance of ADLs  - Assess for home care needs following discharge   - Consider OT consult to assist with ADL evaluation and planning for discharge  - Provide patient education as appropriate  Outcome: Progressing     Problem: DISCHARGE PLANNING  Goal: Discharge to home or other facility with appropriate resources  Description: INTERVENTIONS:  - Identify barriers to discharge w/patient and caregiver  - Arrange for needed discharge resources and transportation as appropriate  - Identify discharge learning needs (meds, wound care, etc )  - Arrange for interpretive services to assist at discharge as needed  - Refer to Case Management Department for coordinating discharge planning if the patient needs post-hospital services based on physician/advanced practitioner order or complex needs related to functional status, cognitive ability, or social support system  Outcome: Progressing     Problem: Knowledge Deficit  Goal: Patient/family/caregiver demonstrates understanding of disease process, treatment plan, medications, and discharge instructions  Description: Complete learning assessment and assess knowledge base    Interventions:  - Provide teaching at level of understanding  - Provide teaching via preferred learning methods  Outcome: Progressing

## 2022-07-20 NOTE — PLAN OF CARE
Problem: PAIN - ADULT  Goal: Verbalizes/displays adequate comfort level or baseline comfort level  Description: Interventions:  - Encourage patient to monitor pain and request assistance  - Assess pain using appropriate pain scale  - Administer analgesics based on type and severity of pain and evaluate response  - Implement non-pharmacological measures as appropriate and evaluate response  - Consider cultural and social influences on pain and pain management  - Notify physician/advanced practitioner if interventions unsuccessful or patient reports new pain  Outcome: Progressing     Problem: INFECTION - ADULT  Goal: Absence or prevention of progression during hospitalization  Description: INTERVENTIONS:  - Assess and monitor for signs and symptoms of infection  - Monitor lab/diagnostic results  - Monitor all insertion sites, i e  indwelling lines, tubes, and drains  - Monitor endotracheal if appropriate and nasal secretions for changes in amount and color  - Natalia appropriate cooling/warming therapies per order  - Administer medications as ordered  - Instruct and encourage patient and family to use good hand hygiene technique  - Identify and instruct in appropriate isolation precautions for identified infection/condition  Outcome: Progressing  Goal: Absence of fever/infection during neutropenic period  Description: INTERVENTIONS:  - Monitor WBC    Outcome: Progressing     Problem: SAFETY ADULT  Goal: Patient will remain free of falls  Description: INTERVENTIONS:  - Educate patient/family on patient safety including physical limitations  - Instruct patient to call for assistance with activity   - Consult OT/PT to assist with strengthening/mobility   - Keep Call bell within reach  - Keep bed low and locked with side rails adjusted as appropriate  - Keep care items and personal belongings within reach  - Initiate and maintain comfort rounds  - Make Fall Risk Sign visible to staff  - Offer Toileting every 2 Hours, in advance of need  - Initiate/Maintain alarm  - Obtain necessary fall risk management equipment  - Apply yellow socks and bracelet for high fall risk patients  - Consider moving patient to room near nurses station  Outcome: Progressing  Goal: Maintain or return to baseline ADL function  Description: INTERVENTIONS:  -  Assess patient's ability to carry out ADLs; assess patient's baseline for ADL function and identify physical deficits which impact ability to perform ADLs (bathing, care of mouth/teeth, toileting, grooming, dressing, etc )  - Assess/evaluate cause of self-care deficits   - Assess range of motion  - Assess patient's mobility; develop plan if impaired  - Assess patient's need for assistive devices and provide as appropriate  - Encourage maximum independence but intervene and supervise when necessary  - Involve family in performance of ADLs  - Assess for home care needs following discharge   - Consider OT consult to assist with ADL evaluation and planning for discharge  - Provide patient education as appropriate  Outcome: Progressing  Goal: Maintains/Returns to pre admission functional level  Description: INTERVENTIONS:  - Perform BMAT or MOVE assessment daily    - Set and communicate daily mobility goal to care team and patient/family/caregiver  - Collaborate with rehabilitation services on mobility goals if consulted  - Perform Range of Motion 3 times a day  - Reposition patient every 2 hours    - Dangle patient 3 times a day  - Stand patient 3 times a day  - Ambulate patient 3 times a day  - Out of bed to chair 3 times a day   - Out of bed for meals 3 times a day  - Out of bed for toileting  - Record patient progress and toleration of activity level   Outcome: Progressing     Problem: DISCHARGE PLANNING  Goal: Discharge to home or other facility with appropriate resources  Description: INTERVENTIONS:  - Identify barriers to discharge w/patient and caregiver  - Arrange for needed discharge resources and transportation as appropriate  - Identify discharge learning needs (meds, wound care, etc )  - Arrange for interpretive services to assist at discharge as needed  - Refer to Case Management Department for coordinating discharge planning if the patient needs post-hospital services based on physician/advanced practitioner order or complex needs related to functional status, cognitive ability, or social support system  Outcome: Progressing

## 2022-07-20 NOTE — PHYSICAL THERAPY NOTE
PHYSICAL THERAPY EVALUATION NOTE          Patient Name: Cici Leggett  OCUKM'X Date: 2022      AGE:   46 y o  Mrn:   [id number]  ADMIT DX:  Other spondylosis with myelopathy, cervical region [M47 12]  Myelopathy ([facility] [address] ) [G95 9]  Spinal cord compression ([facility] [address] ) [G95 20]    Past Medical History:  Past Medical History:   Diagnosis Date    Enlarged prostate        Past Surgical History:  Past Surgical History:   Procedure Laterality Date    ABCESS DRAINAGE      KNEE ARTHROSCOPY Left     as a teenager    VT ARTHRODESIS POSTERIOR/POSTERIORLATERAL CERVICAL BELOW C2 N/A 2022    Procedure: POSTERIOR CERVICAL LAMINECTOMY C3-6, FIXATION FUSION C2-T1 (IMPULSE MONITORING); Surgeon: Henry Allison MD;  Location: [facility];  Service: Neurosurgery       Length Of Stay: 1        PHYSICAL THERAPY EVALUATION:    Patient's identity confirmed via 2 patient identifiers (full name and ) at start of session       22 1257   PT Last Visit   PT Visit Date 22   Note Type   Note type Evaluation   Pain Assessment   Pain Assessment Tool 0-10   Pain Score 8  (prior to mobility, 9-10/10 post)   Pain Location/Orientation Location: Neck; Location: Shoulder   Pain Onset/Description Frequency: Constant/Continuous; Descriptor: Cramping;Descriptor: Discomfort   Effect of Pain on Daily Activities limits overall tolerance to functional mobility   Patient's Stated Pain Goal No pain   Hospital Pain Intervention(s) Repositioned; Ambulation/increased activity; Emotional support  (RN and Neeurosurgery PA notified)   Restrictions/Precautions   Weight Bearing Precautions Per Order No   Braces or Orthoses C/S Collar  (Cipriano Cato at all times (donned prior to arrival to room and remained donned at end of session), Irma collar for bathing)   Other Precautions Multiple lines; Fall Risk;Pain;Spinal precautions  (IV pole, catheter)   Home Living   Type of [address] [place] [address] level;Performs ADLs on one level; Able to live on main level with bedroom/bathroom;Stairs to enter with rails  (10 ALO w/ L railing)   Bathroom Shower/Tub Tub/shower unit   Bathroom Toilet Standard   Bathroom Equipment Grab bars in shower; Shower chair;Commode   Bathroom Accessibility Accessible   Home Equipment Grab bars  (no ambulation DME)   Additional Comments Pt reports living w/ girlfriend in a 1 level house w/ 10 ALO w/ L railing   Prior Function   Level of [place] Independent with ADLs and functional mobility   Lives With Significant other  (girlfriend, hip replacement surgery approx 1 year ago)   Receives Help From   (significant other)   ADL Assistance Independent   IADLs Independent  (except pt does not drive)   Falls in the last 6 months 1 to 4  (pt reports 2 falls)   Vocational Full time employment  ()   Comments At baseline, pt ambulates independently w/o AD, performs ADLs independently, shares IADLs w/ significant other   General   Additional Pertinent History POD 1: C2-T1 PCDF   Family/Caregiver Present No   Cognition   Overall Cognitive Status WFL   Arousal/Participation Cooperative   Attention Within functional limits   Orientation Level Oriented X4   Memory Within functional limits   Following Commands Follows multistep commands with increased time or repetition   Comments Pt ID via name and ; pt agreeable to PT eval and OOB mobility   Strength RLE   R Hip Flexion 3/5   R Knee Flexion 3+/5   R Knee Extension 3/5   R Ankle Dorsiflexion 4/5   R Ankle Plantar Flexion 4/5   Strength LLE   L Hip Flexion 3/5   L Knee Flexion 3+/5   L Knee Extension 3/5   L Ankle Dorsiflexion 4/5   L Ankle Plantar Flexion 4/5   Light Touch   RLE Light Touch Impaired   RLE Light Touch Comments pt reports decreased sensation to B/L toes, improved from pre-surgery   LLE Light Touch Impaired   LLE Light Touch Comments pt reports decreased sensation to B/L toes, improved from pre-surgery   Bed Mobility   Supine to Sit 5  Supervision   Additional items Assist x 1;HOB elevated; Bedrails; Increased time required;Verbal cues   Sit to Supine Unable to assess   Additional items   (pt OOB in recliner chair at end of session)   Additional Comments pt able to maintain sitting balance at EOB, declined feeling lightheaded/dizzy w/ all changes in position   Transfers   Sit to Stand 4  Minimal assistance   Additional items Assist x 1; Increased time required;Verbal cues  (CGA)   Stand to Sit 5  Supervision   Additional items Assist x 1; Armrests; Verbal cues   Additional Comments VC for hand placement   Ambulation/Elevation   Gait pattern Decreased foot clearance; Short stride; Excessively slow;Knees flexed;Decreased heel strike;Decreased toe off   Gait Assistance 4  Minimal assist   Additional items Assist x 1;Verbal cues  (fluctuating w/ CGA/steadying assist)   Assistive Device Rolling walker   Distance 40'   Stair Management Assistance Not tested  (pt declined at this time)   Ambulation/Elevation Additional Comments Pt able to ambulate w/ min Ax1 and RW  Pt declined fruther ambulation and stair negotiation trial at this time, was able to perform bilateral standing marching in place 3x each LE w/ BUE support on RW   Balance   Static Sitting Fair   Dynamic Sitting Fair -   Static Standing Fair -   Dynamic Standing Poor +   Ambulatory Poor +   Endurance Deficit   Endurance Deficit Yes   Endurance Deficit Description pt w/ limited activity tolerance and limited ambulatory endurance   Activity Tolerance   Activity Tolerance Patient limited by pain  (nausea)   Medical Staff Made Aware Spoke to Neurosurgery [facility] prior to eval and updated post via TT, Spoke to [address] [place] [address]   Assessment   Prognosis Fair   Problem List Decreased strength;Decreased endurance; Impaired balance;Decreased mobility;Pain;Decreased skin integrity;Orthopedic restrictions; Impaired sensation   Assessment Anuel Delvalle is a 46 y o   Male who is POD 1 C2-T1 PCDF w/ diagnosis of cervical region spondylosis w/ myelopathy  Orders for PT eval and treat received w/ spinal precautions  Comorbidities affecting pt at time of evaluation include: lumbar radiculopathy  Personal factors affecting DC include: inaccessible home environment, stairs to enter home and positive fall history  At baseline, pt mobilizes independently w/ no AD, and w/ 2 fall(s) in the previous 6 months  Upon evaluation, pt presents w/ the following deficits: weakness, altered sensation, impaired balance, decreased endurance, pain limiting functional mobility and gait deviations  Pt currently requires supervision for bed mobility, min Ax1 for transfers, and min Ax1 w/ RW for ambulation  Pt's clinical presentation is unstable/unpredictable due to need for assist w/ all phases of mobility when usually mobilizing independently, tolerance to only 40 feet of ambulation, pain impacting overall mobility status, need for input for mobility technique, recent drastic decline in mobility compared to baseline and recent history of falls  Pt is at an increased risk of falls due to impaired LE sensation, impaired balance, decreased functional visual field due to C collar restricting mobility  From a PT/mobility standpoint, given the above findings, anticipate OPPT pending pain/nausea and stair negotiation trials  During current admission, pt will benefit from continued skilled inpatient PT in the acute care setting in order to address the above deficits and to maximize function and mobility prior to DC from acute care     Barriers to Discharge (S)  Inaccessible home environment   Goals   Patient Goals to not be nauseous   STG Expiration Date 07/30/22   Short Term Goal #1 Pt will: recall 3/3 spinal precautions and cervical brace wearing schedule; maintain spinal precautions 100% of the time during functional mobility; perform bilateral rolling bed mobility w/ mod I to promote repositioning in a supine position; perform supine<>sitting at EOB mobility w/ mod I to increase pt's independence w/ functional mobility; perform transfers w/ mod I to increase pt's OOB mobility; ambulate at least 350' w/ LRAD and mod I to increase pt's ambulatory endurance; negotiate 10 steps w/ UE support and supervision to facilitate pt returning to home living environment; increase all balance ratings by at least 1 grade to decrease pt's risk of falls   PT Treatment Day 1  (PT tx note below)   Plan   Treatment/Interventions Functional transfer training;LE strengthening/ROM; Elevations; Therapeutic exercise; Endurance training;Patient/family training;Equipment eval/education; Bed mobility;Gait training   PT Frequency 5-7x/wk   Recommendation   PT Discharge Recommendation Home with outpatient rehabilitation  (please see comment below)   Equipment Recommended [place] walker   Change/add to Page[employer]? No   Additional Comments DC rec: OPPT pending pain/nausea management and stair negotiation trials as pt has 10 ALO house   AM-PAC Basic Mobility Inpatient   Turning in Bed Without Bedrails 3   Lying on Back to Sitting on Edge of Flat Bed 3   Moving Bed to Chair 3   Standing Up From Chair 3   Walk in Room 3   Climb 3-5 Stairs 2   Basic Mobility Inpatient Raw Score 17   Basic Mobility Standardized Score 39 67   Highest Level Of Mobility   JH-HLM Goal 5: Stand one or more mins   JH-HLM Achieved 7: Walk 25 feet or more   Additional Treatment Session   Start Time 1318   End Time 1330   Treatment Assessment Further PT intervention provided to pt including spinal precaution and mobility precaution education  Reinforcement of brace wearing schedule and spinal precautions w/ pt confirming understanding  Pt encouraged to mobility w/ staff using RW during current admission to increase pt's tolerance to functional mobility  Pt confirmed understanding   In standing, pt perform 5 toe raises w/ BUE support on RW and close supervision; however, pt continued to decline further ambulation and stair negotiation trials at this time due to pain and onset of nausea  Pt will continue to benefit from skilled PT intervention to promote pt's independence w/ functional mobility and progress towards set goals  Pt able to recall 3/3 spinal precautions at end of session  Continue to recommend DC home with outpatient rehabilitation pending pain/nausea management and stair negotiation trials  Additional Treatment Day 1   End of Consult   Patient Position at End of Consult Bedside chair; All needs within reach  (LE elevated, pt educated on using call-bell for all assistance w/ pt confirming understanding, RN ok'ed pt being unalarmed in chair)       The patient's AM-PAC Basic Mobility Inpatient Short Form Raw Score is 17  A Raw score of greater than 16 suggests the patient may benefit from discharge to home  Please also refer to the recommendation of the Physical Therapist for safe discharge planning      Pt will benefit from skilled inpatient PT during this admission in order to facilitate progress towards goals and to maximize functional independence prior to [facility] 5 rec: OPPT pending pain/nausea management and stair negotiation trials        Zacarias Torrez, PT, DPT  07/20/22

## 2022-07-20 NOTE — QUICK NOTE
I was contacted by Night shift RN, Myra Duque at 2:40AM for Mr Dinesh Peralta having " tremor" like movements, stiffness, and difficulty moving out of bed  Per RN Myar Duque, he thinks the tremor/shaking movement in his extremities is worsened after the surgey and he is very anxious, His BP elevated for the moment 160/10 and came down on 2nd measurement , 140/80 after the extremities "tremor" and stiffness are  subsided  The Nurse thinks legs stiffness exacerbated  after Dilaudid injection  Patient had bilateral legs stiffness/increased tones earlier after the procedure, it could be part of the myelopathy symptoms that may be aggravated after surgery, or dilaudid can  also cause some muscle stiffness/tremor like movements  Plan:  1  Recommend to Hold off Dilaudid for the moment  2  Give him Valium 5 mg IV PRN BID   3  Monitor BP frequently, and inform the provider if it >160/90  4   Call with question or concern

## 2022-07-20 NOTE — PLAN OF CARE
Problem: PHYSICAL THERAPY ADULT  Goal: Performs mobility at highest level of function for planned discharge setting  See evaluation for individualized goals  Description: Treatment/Interventions: Functional transfer training, LE strengthening/ROM, Elevations, Therapeutic exercise, Endurance training, Patient/family training, Equipment eval/education, Bed mobility, Gait training  Equipment Recommended: Delores Esparza       See flowsheet documentation for full assessment, interventions and recommendations  7/20/2022 1441 by Mariusz Solano PT  Note: Prognosis: Fair  Problem List: Decreased strength, Decreased endurance, Impaired balance, Decreased mobility, Pain, Decreased skin integrity, Orthopedic restrictions, Impaired sensation  Assessment: Vazquez Dickens is a 46 y o  Male who is POD 1 C2-T1 PCDF w/ diagnosis of cervical region spondylosis w/ myelopathy  Orders for PT eval and treat received w/ spinal precautions  Comorbidities affecting pt at time of evaluation include: lumbar radiculopathy  Personal factors affecting DC include: inaccessible home environment, stairs to enter home and positive fall history  At baseline, pt mobilizes independently w/ no AD, and w/ 2 fall(s) in the previous 6 months  Upon evaluation, pt presents w/ the following deficits: weakness, altered sensation, impaired balance, decreased endurance, pain limiting functional mobility and gait deviations  Pt currently requires supervision for bed mobility, min Ax1 for transfers, and min Ax1 w/ RW for ambulation  Pt's clinical presentation is unstable/unpredictable due to need for assist w/ all phases of mobility when usually mobilizing independently, tolerance to only 40 feet of ambulation, pain impacting overall mobility status, need for input for mobility technique, recent drastic decline in mobility compared to baseline and recent history of falls   Pt is at an increased risk of falls due to impaired LE sensation, impaired balance, decreased functional visual field due to C collar restricting mobility  From a PT/mobility standpoint, given the above findings, anticipate OPPT pending pain/nausea and stair negotiation trials  During current admission, pt will benefit from continued skilled inpatient PT in the acute care setting in order to address the above deficits and to maximize function and mobility prior to DC from acute care  Barriers to Discharge: (S) Inaccessible home environment     PT Discharge Recommendation: Home with outpatient rehabilitation (please see comment below)    See flowsheet documentation for full assessment

## 2022-07-21 LAB
ANION GAP SERPL CALCULATED.3IONS-SCNC: 8 MMOL/L (ref 4–13)
BASOPHILS # BLD AUTO: 0.04 THOUSANDS/ΜL (ref 0–0.1)
BASOPHILS NFR BLD AUTO: 0 % (ref 0–1)
BUN SERPL-MCNC: 7 MG/DL (ref 5–25)
CALCIUM SERPL-MCNC: 9 MG/DL (ref 8.4–10.2)
CHLORIDE SERPL-SCNC: 103 MMOL/L (ref 96–108)
CO2 SERPL-SCNC: 27 MMOL/L (ref 21–32)
CREAT SERPL-MCNC: 0.85 MG/DL (ref 0.6–1.3)
EOSINOPHIL # BLD AUTO: 0.07 THOUSAND/ΜL (ref 0–0.61)
EOSINOPHIL NFR BLD AUTO: 1 % (ref 0–6)
ERYTHROCYTE [DISTWIDTH] IN BLOOD BY AUTOMATED COUNT: 14.2 % (ref 11.6–15.1)
GFR SERPL CREATININE-BSD FRML MDRD: 100 ML/MIN/1.73SQ M
GLUCOSE SERPL-MCNC: 136 MG/DL (ref 65–140)
HCT VFR BLD AUTO: 41.3 % (ref 36.5–49.3)
HGB BLD-MCNC: 13.9 G/DL (ref 12–17)
IMM GRANULOCYTES # BLD AUTO: 0.04 THOUSAND/UL (ref 0–0.2)
IMM GRANULOCYTES NFR BLD AUTO: 0 % (ref 0–2)
LYMPHOCYTES # BLD AUTO: 1.78 THOUSANDS/ΜL (ref 0.6–4.47)
LYMPHOCYTES NFR BLD AUTO: 16 % (ref 14–44)
MCH RBC QN AUTO: 29.7 PG (ref 26.8–34.3)
MCHC RBC AUTO-ENTMCNC: 33.7 G/DL (ref 31.4–37.4)
MCV RBC AUTO: 88 FL (ref 82–98)
MONOCYTES # BLD AUTO: 1.02 THOUSAND/ΜL (ref 0.17–1.22)
MONOCYTES NFR BLD AUTO: 9 % (ref 4–12)
NEUTROPHILS # BLD AUTO: 8.12 THOUSANDS/ΜL (ref 1.85–7.62)
NEUTS SEG NFR BLD AUTO: 74 % (ref 43–75)
NRBC BLD AUTO-RTO: 0 /100 WBCS
PLATELET # BLD AUTO: 276 THOUSANDS/UL (ref 149–390)
PMV BLD AUTO: 8.9 FL (ref 8.9–12.7)
POTASSIUM SERPL-SCNC: 3.8 MMOL/L (ref 3.5–5.3)
RBC # BLD AUTO: 4.68 MILLION/UL (ref 3.88–5.62)
SODIUM SERPL-SCNC: 138 MMOL/L (ref 135–147)
WBC # BLD AUTO: 11.07 THOUSAND/UL (ref 4.31–10.16)

## 2022-07-21 PROCEDURE — 97530 THERAPEUTIC ACTIVITIES: CPT

## 2022-07-21 PROCEDURE — 85025 COMPLETE CBC W/AUTO DIFF WBC: CPT | Performed by: PHYSICIAN ASSISTANT

## 2022-07-21 PROCEDURE — 97535 SELF CARE MNGMENT TRAINING: CPT

## 2022-07-21 PROCEDURE — 80048 BASIC METABOLIC PNL TOTAL CA: CPT | Performed by: PHYSICIAN ASSISTANT

## 2022-07-21 PROCEDURE — 97110 THERAPEUTIC EXERCISES: CPT

## 2022-07-21 PROCEDURE — 97116 GAIT TRAINING THERAPY: CPT

## 2022-07-21 PROCEDURE — 97760 ORTHOTIC MGMT&TRAING 1ST ENC: CPT

## 2022-07-21 PROCEDURE — 99024 POSTOP FOLLOW-UP VISIT: CPT | Performed by: PHYSICIAN ASSISTANT

## 2022-07-21 RX ADMIN — DOCUSATE SODIUM 100 MG: 100 CAPSULE, LIQUID FILLED ORAL at 08:57

## 2022-07-21 RX ADMIN — HEPARIN SODIUM 5000 UNITS: 5000 INJECTION INTRAVENOUS; SUBCUTANEOUS at 22:13

## 2022-07-21 RX ADMIN — HEPARIN SODIUM 5000 UNITS: 5000 INJECTION INTRAVENOUS; SUBCUTANEOUS at 05:10

## 2022-07-21 RX ADMIN — METHOCARBAMOL 750 MG: 750 TABLET ORAL at 05:11

## 2022-07-21 RX ADMIN — GABAPENTIN 100 MG: 100 CAPSULE ORAL at 08:57

## 2022-07-21 RX ADMIN — ACETAMINOPHEN 975 MG: 325 TABLET ORAL at 13:54

## 2022-07-21 RX ADMIN — OXYCODONE HYDROCHLORIDE 2.5 MG: 5 TABLET ORAL at 13:54

## 2022-07-21 RX ADMIN — GABAPENTIN 100 MG: 100 CAPSULE ORAL at 22:13

## 2022-07-21 RX ADMIN — HEPARIN SODIUM 5000 UNITS: 5000 INJECTION INTRAVENOUS; SUBCUTANEOUS at 13:54

## 2022-07-21 RX ADMIN — OXYCODONE HYDROCHLORIDE 2.5 MG: 5 TABLET ORAL at 22:22

## 2022-07-21 RX ADMIN — FINASTERIDE 5 MG: 5 TABLET, FILM COATED ORAL at 08:57

## 2022-07-21 RX ADMIN — METHOCARBAMOL 750 MG: 750 TABLET ORAL at 13:54

## 2022-07-21 RX ADMIN — GABAPENTIN 100 MG: 100 CAPSULE ORAL at 17:07

## 2022-07-21 RX ADMIN — DIAZEPAM 2.5 MG: 10 INJECTION, SOLUTION INTRAMUSCULAR; INTRAVENOUS at 23:12

## 2022-07-21 RX ADMIN — ACETAMINOPHEN 975 MG: 325 TABLET ORAL at 22:13

## 2022-07-21 RX ADMIN — DOCUSATE SODIUM 100 MG: 100 CAPSULE, LIQUID FILLED ORAL at 17:07

## 2022-07-21 RX ADMIN — SENNOSIDES 8.6 MG: 8.6 TABLET, FILM COATED ORAL at 08:57

## 2022-07-21 RX ADMIN — OXYCODONE HYDROCHLORIDE 2.5 MG: 5 TABLET ORAL at 03:02

## 2022-07-21 RX ADMIN — METHOCARBAMOL 750 MG: 750 TABLET ORAL at 22:13

## 2022-07-21 RX ADMIN — PANTOPRAZOLE SODIUM 40 MG: 40 TABLET, DELAYED RELEASE ORAL at 05:10

## 2022-07-21 RX ADMIN — ACETAMINOPHEN 975 MG: 325 TABLET ORAL at 05:10

## 2022-07-21 NOTE — PLAN OF CARE
Problem: OCCUPATIONAL THERAPY ADULT  Goal: Performs self-care activities at highest level of function for planned discharge setting  See evaluation for individualized goals  Description: Treatment Interventions: ADL retraining, Functional transfer training, Endurance training, Patient/family training, Equipment evaluation/education, Compensatory technique education, Energy conservation, Activityengagement  Equipment Recommended: Shower/Tub chair with back ($)       See flowsheet documentation for full assessment, interventions and recommendations  Outcome: Progressing  Note: Limitation: Decreased ADL status, Decreased UE strength, Decreased endurance, Decreased self-care trans, Decreased high-level ADLs     Assessment: Pt seen for OT tx session with focus on functional balance, functional mobility, ADL status, and transfer safety  Patient agreeable to OT treatment session  Pt received supine in bed  Pt required Verbal Cue x1 while recalling spinal precautions  Pt completed supine > sit with S s/p reviewing log roll technique  Educated pt on importance of practicing bed transfers to simulate at home, pt declines this session & requests HOB remain up  Pt performed all sit <> stands with CGA & RW  Pt completed functional mobility with CGA & RW  Pt originally with flexed posture while mobilizing, reports this was baseline  Pt then straightened posture  Pt performed all stand > sits with S & armrests  Pt able to tolerate sitting edge of recliner w/o back support for approx 15 min  While sitting, pt thoroughly education on changing [address] collar <> [place] collar, as well changing [place] Collar pads  Reviewed cervical collar handout with pt  Pt completed stand > sit with CGA & c/o RLE muscle cramp  Min Ax1 for steadiness  Pt performed stand pivot to EOB, S for stand > sit with bed rails  Pt returned to supine with S & increased time  Encourage pt mobilize TID & utilize BR with nursing staff for safety  Patient continues to be functioning below baseline level, occupational performance remains limited secondary to factors listed above, and pt at increased risk for falls and injury  The patient's raw score on the AM-PAC Daily Activity inpatient short form is 20, standardized score is 42 03, greater than 39 4  Patients at this level are likely to benefit from DC to home  Please refer to the recommendation of the Occupational Therapist for safe DC planning  From OT standpoint, recommendation at time of D/C would be Home OT to address independence in ADL performs & functional mobility/transfers in the home  Patient to benefit from continued Occupational Therapy treatment while in the hospital to address deficits as defined above and maximize level of functional independence with ADLs and functional mobility  Pt left supine in bed with call bell in reach, tray table in reach, needs met, RN informed       OT Discharge Recommendation: Home with home health rehabilitation (Pt reports S/O is limited in assisting pt)

## 2022-07-21 NOTE — PROGRESS NOTES
Progress Note - Neurosurgery   Hussain Ribera 46 y o  male MRN: [id number]  Unit/Bed#: S -01 Encounter: [id number]      Assessment:  1  S/P#2 C2-T1 PCDF, C3-6 Lami ( Dr Darwin Delgadillo)  2  Spondylosis of cervical spine with Myelopathy  3  Cervical SC compression     Plan:  · Exam:  A&OX3, EOMI 2 mm conj bilaterally  SF, communicates well, SF  Tawana, right shoulder abduction weakness stable at 4-to 3+ /5 with difficulty abducting above 90 degree, and maintaining outstretched arm, improved paresthesia and numbness on finger tips,  5/5 and IO strength 5/5 in clouding LE DF/PF, stiffness & increased tone in lower limbs better, was OOB and walked 40' yesterday  Sensation to LT intact bilaterally  DTR 3+   No clonus bilaterally  Dressing clean and dry, ROYAL drain [id number]=[id number]/24h darkish body fluid  [place] collar on  · Imaging reviewed personally and by attending  Final results as below:  ? Post op Upright Cx spine xrays in brace shows post op changes with good construction and stable hardware  · Pain control:  1  Tylenol 975 mg oral Q8H, scheduled-pain  2  Gabapentin 100 mg oral 3 times daily  3  Oxycodone 5 mg oral q 4h p r n  severe pain  4  Oxycodone 2 5 mg oral q 4h p r n  moderate pain  5  Valium 5mg IV Q8H, PRN  · Mobilize-As tolerated   · PT/OT eval and treatment-Recommends Home with OP PT  · DVT PPX: SCDs, bilateral legs, consider HSQ tomorrow  · Medical Mx-   · BPH on Finasteride, 5 mg oral /d-Wesley cath removed, started voiding urine, no complaint  · Didn't have BM, passed some gas-encourage eating and drinking fluid, antacid, laxative  · ROYAL drains BN=280/24h  · Patient is doing better, complains of incisional pain, but that is better this morning after he took oxy, robaxin and Tylenol  Continue pain control, PT/OT, encourage OOB to recliner, Wear [place] collar all the time and Irma collar for shower  Please encourage  him to drink fluid, as his  Net IP & OP shows negative   PT recommends home with out patient PT  Will continue to follow the patient  Consider removing ROYAL drain tomorrow and discharging patient  Call with question or concern  Subjective/Objective   Chief Complaint: " Right arm weak and incisional pain"/2nd post Cervical fusion progress note    Subjective:  Patient reports  incisional pain overnight , but noticed improvement with cocktails of Oxy, Robaxin, and Tylenol including Gabapentin that he was given this morning  He  Noticed improvement with his fingers numbness and paresthesia, only slight residual on his finger tips  Also complains of difficulty lifting his right UE  Noticed improvement with frequency of spasms in his LEs  Reports some pain in his right hip  Denies any issues with voiding urine  He didn't have his BM yet, was not eating much because of his upset stomach, now feeling to eat  Patient denies fever, chills, rigors , cough or chest pain  No drainage or discharge from the incision site  Wearing [place] collar  Objective:  Patient lying in bed , communicates well and in no apparent distress  wearing [employer] collar  I/O       07/19 0701  07/20 0700 07/20 0701 07/21 0700    P  O  480 120    I V  (mL/kg) 1900 (25 3)     IV Piggyback 250     Total Intake(mL/kg) 2630 (35) 120 (1 6)    Urine (mL/kg/hr) 3675 2150 (1 2)    Drains 170 150    Total Output 3845 2300    Net -1215 -2180                Invasive Devices  Report    Peripheral Intravenous Line  Duration           Peripheral IV Left;Proximal;Ventral (anterior) Forearm -- days    Peripheral IV 07/19/22 Right;Ventral (anterior) Forearm 2 days    Peripheral IV 07/19/22 Left Forearm 1 day          Drain  Duration           Closed/Suction Drain Posterior;Midline Neck Bulb 1 day    Urethral Catheter Non-latex 16 Fr  1 day                Physical Exam:  Vitals: Blood pressure 153/84, pulse 71, temperature 99 °F (37 2 °C), temperature source Oral, resp   rate 18, height 6' (1 829 m), weight 75 2 kg (165 lb 12 6 oz), SpO2 96 %  ,Body mass index is 22 48 kg/m²  General appearance: alert, appears stated age, cooperative and no distress  Head: Normocephalic, without obvious abnormality, atraumatic  Eyes: EOMI, 2 mm conj bilat  Neck: Dressing clean and dry, ROYAL in place, BE=639d;/24h  Lungs: non labored breathing  Heart: regular heart rate  Neurologic:   Mental status: Alert, oriented x3, thought content appropriate  Cranial nerves: grossly intact (Cranial nerves II-XII)  Sensory: normal to LT bialterally  Motor: see exam section above  Reflexes: 3+ and symmetric  No clonus and Babinski is negative bialterally  Coordination: right arm, unable to maintain stretched arm and performing finger to nose, left arm finger to nose normal & without drift  Lab Results:  Results from last 7 days   Lab Units 07/20/22  0804 07/19/22  1836   WBC Thousand/uL 13 15*  --    HEMOGLOBIN g/dL 13 1  --    HEMATOCRIT % 40 2  --    PLATELETS Thousands/uL 278 305     Results from last 7 days   Lab Units 07/20/22  0926   POTASSIUM mmol/L 3 8   CHLORIDE mmol/L 103   CO2 mmol/L 30   BUN mg/dL 9   CREATININE mg/dL 0 93   CALCIUM mg/dL 9 0                 No results found for: TROPONINT  ABG:No results found for: PHART, NJO8AHH, PO2ART, XRQ8COZ, O7NJODLE, BEART, SOURCE    Imaging Studies: I have personally reviewed pertinent reports  and I have personally reviewed pertinent films in PACS    EKG, Pathology, and Other Studies: I have personally reviewed pertinent reports        VTE Pharmacologic Prophylaxis: Heparin SQ    VTE Mechanical Prophylaxis: sequential compression device

## 2022-07-21 NOTE — PLAN OF CARE
Problem: PHYSICAL THERAPY ADULT  Goal: Performs mobility at highest level of function for planned discharge setting  See evaluation for individualized goals  Description: Treatment/Interventions: Functional transfer training, LE strengthening/ROM, Elevations, Therapeutic exercise, Endurance training, Patient/family training, Equipment eval/education, Bed mobility, Gait training  Equipment Recommended: Drake Camejo       See flowsheet documentation for full assessment, interventions and recommendations  Outcome: Progressing  Note: Prognosis: Fair  Problem List: Decreased strength, Decreased endurance, Impaired balance, Decreased mobility, Orthopedic restrictions, Pain, Decreased skin integrity, Impaired sensation  Assessment: pt began tx session lying supine in the bed and was agreeable to participate in PT intervention  pt continues to remain consistant with /s for all bed mobility as pt required VC's to use bed rail and to maintain spinal precautions  pt utilizes log roll to R side in order to maintain spinal precautions  pt was able to sit EOB w/o LOB while performing TE activities in order to increase static/dynamic sitting balance at EOB  Slight progress was made as pt was able to perform all functional transfers with /s but continues to require VC's for hand placement while ascending to RW and descending back to seated EOB  pt was able to perform 5 STS in todays tx session in order to strengthen Le's and increase endurance and safety with all functional transfers  pt cotninues to be limited with functional mobility and ambulation distance but was able to increase ambulation distance compared to previous tx sessions to 150'x1 with /s and no LOB  pt was able to complete 3 steps in todays tx session as pt required min Ax1 for safety and balance  Additional was not possible due to fatigue as pt requested to be placed back into bed   post tx pt in bed with call bell and all pt needs met  Barriers to Discharge: (S) Inaccessible home environment     PT Discharge Recommendation: Home with outpatient rehabilitation    See flowsheet documentation for full assessment

## 2022-07-21 NOTE — PHYSICAL THERAPY NOTE
PHYSICAL THERAPY NOTE          Patient Name: Cici Leggett  TPGBS'P Date: 22 1412   PT Last Visit   PT Visit Date 22   Note Type   Note Type Treatment   Pain Assessment   Pain Assessment Tool 0-10   Pain Score 9   Sy-Baker FACES Pain Rating 8   Pain Location/Orientation Location: Neck   Pain Onset/Description Onset: Ongoing   Effect of Pain on Daily Activities limited activity tolerance   Patient's Stated Pain Goal No pain   Hospital Pain Intervention(s) Repositioned; Emotional support; Rest   Multiple Pain Sites No   Pain Rating: FLACC (Rest) - Face 0   Pain Rating: FLACC (Rest) - Legs 0   Pain Rating: FLACC (Rest) - Activity 0   Pain Rating: FLACC (Rest) - Cry 0   Pain Rating: FLACC (Rest) - Consolability 0   Score: FLACC (Rest) 0   Restrictions/Precautions   Weight Bearing Precautions Per Order No   Braces or Orthoses C/S Collar  (aspen vista at all times, isaac collar for showering)   Other Precautions Fall Risk;Pain;Spinal precautions   General   Chart Reviewed Yes   Response to Previous Treatment Patient with no complaints from previous session  Family/Caregiver Present No   Cognition   Overall Cognitive Status WFL   Arousal/Participation Alert; Responsive   Attention Within functional limits   Orientation Level Oriented X4   Memory Within functional limits   Following Commands Follows all commands and directions without difficulty   Comments pt was identified by name and    Subjective   Subjective pt was agreeable to participate in Pt intervention   Bed Mobility   Supine to Sit 5  Supervision   Additional items Assist x 1; Increased time required; Bedrails;HOB elevated;Verbal cues   Sit to Supine 5  Supervision   Additional items Assist x 1;HOB elevated; Bedrails; Increased time required;Verbal cues   Additional Comments pt was able to sit EOB and perform tE activities w/o LOB and no increases in pain   Transfers   Sit to Stand 5  Supervision   Additional items Assist x 1; Armrests; Increased time required;Verbal cues  (w/ RW)   Stand to Sit 5  Supervision   Additional items Assist x 1; Armrests; Increased time required;Verbal cues  (w/ RW)   Stand pivot 5  Supervision   Additional items Assist x 1; Armrests; Increased time required;Verbal cues   Additional Comments pt continues to require RW and VC's for hand placement and RW management while ambulating in todays tx session   Ambulation/Elevation   Gait pattern Decreased foot clearance; Short stride; Excessively slow;Knees flexed;Decreased heel strike;Decreased toe off   Gait Assistance 5  Supervision   Additional items Assist x 1;Verbal cues   Assistive Device Rolling walker   Distance 150'x1 RW   Stair Management Assistance 4  Minimal assist   Additional items Assist x 1;Verbal cues; Tactile cues   Stair Management Technique Two rails; Step to pattern; Foreward;Backward   Number of Stairs 3  (additional not possible due to fatigue)   Balance   Static Sitting Fair   Dynamic Sitting Fair -   Static Standing Fair -   Dynamic Standing Fair -   Ambulatory Poor +   Endurance Deficit   Endurance Deficit Yes   Endurance Deficit Description pt with limited functional mobility and activity tolerance   Activity Tolerance   Activity Tolerance Patient limited by pain; Patient limited by fatigue   Nurse Made Aware Spoke to RN   Exercises   Knee AROM Long Arc Quad Sitting;15 reps;AROM; Bilateral   Ankle Pumps Sitting;15 reps;AROM; Bilateral   Marching 15 reps;AROM; Bilateral   Balance training  standing marches with RW UE support x10   Assessment   Prognosis Fair   Problem List Decreased strength;Decreased endurance; Impaired balance;Decreased mobility;Orthopedic restrictions;Pain;Decreased skin integrity; Impaired sensation   Assessment pt began tx session lying supine in the bed and was agreeable to participate in PT intervention  pt continues to remain consistant with /s for all bed mobility as pt required VC's to use bed rail and to maintain spinal precautions  pt utilizes log roll to R side in order to maintain spinal precautions  pt was able to sit EOB w/o LOB while performing TE activities in order to increase static/dynamic sitting balance at EOB  Slight progress was made as pt was able to perform all functional transfers with /s but continues to require VC's for hand placement while ascending to RW and descending back to seated EOB  pt was able to perform 5 STS in todays tx session in order to strengthen Le's and increase endurance and safety with all functional transfers  pt cotninues to be limited with functional mobility and ambulation distance but was able to increase ambulation distance compared to previous tx sessions to 150'x1 with /s and no LOB  pt was able to complete 3 steps in todays tx session as pt required min Ax1 for safety and balance  Additional was not possible due to fatigue as pt requested to be placed back into bed  post tx pt in bed with call bell and all pt needs met   Goals   Patient Goals for less pain and to return to bed   STG Expiration Date 07/30/22   PT Treatment Day 2   Plan   Treatment/Interventions Functional transfer training;LE strengthening/ROM; Elevations; Endurance training; Therapeutic exercise;Patient/family training;Equipment eval/education; Bed mobility;Spoke to nursing   Progress Progressing toward goals   PT Frequency 5-7x/wk   Recommendation   PT Discharge Recommendation Home with outpatient rehabilitation   Equipment Recommended [facility] Recommended Wheeled walker   Change/add to [place]?  No   AM-PAC Basic Mobility Inpatient   Turning in Bed Without Bedrails 3   Lying on Back to Sitting on Edge of Flat Bed 3   Moving Bed to Chair 3   Standing Up From Chair 3   Walk in Room 3   Climb 3-5 Stairs 3   Basic Mobility Inpatient Raw Score 18   Basic Mobility Standardized Score 41 05   Highest Level Of Mobility   -[facility] Goal 6: Walk 10 steps or more   -HLM Achieved 7: Walk 25 feet or more   Education   Education Provided Assistive device; Mobility training; Other  (stair trials)   The patient's AM-PAC Basic Mobility Inpatient Short Form Raw Score is 18  A Raw score of greater than 16 suggests the patient may benefit from discharge to home  Please also refer to the recommendation of the Physical Therapist for safe discharge planning       Wiliam Her PTA

## 2022-07-21 NOTE — OCCUPATIONAL THERAPY NOTE
Occupational Therapy Tx Note     Patient Name: Manish Conner  XFQCS'J Date: 7/21/2022  Problem List  Principal Problem:    Other spondylosis with myelopathy, cervical region            07/21/22 0948   OT Last Visit   OT Visit Date 07/21/22   Note Type   Note Type Treatment   Restrictions/Precautions   Weight Bearing Precautions Per Order No   Braces or Orthoses C/S Collar  ([place] vista at all time, Irma collar for showering once cleared by MD)   Other Precautions Fall Risk;Pain;Spinal precautions  (Pt required verbal cue X1 to recall spinal precautions  Able to state no bending or lifting  States "I know the other one starts with a T")   General   Response to Previous Treatment Patient with no complaints from previous session   Lifestyle   Reciprocal Relationships Pt reports his S/O that he lives with is limited in assisting pt 2/2 her own needs   Pain Assessment   Pain Assessment Tool Sy-Baker FACES   Sy-Baker FACES Pain Rating 6   Pain Location/Orientation Orientation: Right;Orientation: Upper; Location: Leg;Other (Comment)  (Muscle cramp in RLE; Upper trap soreness  Pt states he was given muscle relaxers)   Patient's Stated Pain Goal No pain   Hospital Pain Intervention(s) Repositioned; Ambulation/increased activity   ADL   UB Dressing Assistance 2  Maximal Assistance   UB Dressing Deficit Fasteners; Increased time to complete;Supervision/safety;Verbal cueing;Pull over head;Pull around back; Other (Comment)  (Donning/doffing aspen collar  Pt reports he will educate his S/O on aspen collar changing and see if she is able to assist)   Toileting Assistance  5  Supervision/Setup   Bed Mobility   Supine to Sit 5  Supervision   Additional items Assist x 1;HOB elevated; Bedrails; Increased time required  (Educated pt on importance of simulating transfers at home  Pt declines this session  Reviewed log roll technique )   Sit to Supine 5  Supervision   Additional items HOB elevated; Bedrails; Increased time required Additional Comments Pt tolerated sitting EOB/Edge of recliner for 15 min with distant supervision  Pt declines all dizziness/lightheadedness w/ all changes of position  Transfers   Sit to Stand 4  Minimal assistance   Additional items Assist x 1; Increased time required;Verbal cues  (CGA)   Stand to Sit 5  Supervision   Additional items Assist x 1; Armrests; Increased time required   Stand pivot 4  Minimal assistance   Additional items Assist x 1; Armrests; Increased time required  (CGA/ RW)   Toilet transfer 5  Supervision   Additional items Increased time required;Commode;Armrests   Functional Mobility   Functional Mobility 4  Minimal assistance   Additional Comments x1 for CGA  Pt originally with flexed posture  States this was his baseline prior to sx 2/2 pain & that he "needs to learn to walk normally again " As mobility furthered, pt began to straighten posture  Additional items Rolling walker   Cognition   Overall Cognitive Status WFL   Arousal/Participation Alert   Attention Within functional limits   Orientation Level Oriented X4   Memory Within functional limits   Following Commands Follows multistep commands without difficulty   Additional Activities   Additional Activities Comments Provided thorough education/demonstration on changing [place] collar to [place] collar & vice versa  Provided throughout education/demonstration on changing [address] collar pads & maintenance  Reviewed cervical collar handout with pt  All questions & concerns answered  Activity Tolerance   Activity Tolerance Patient tolerated treatment well   Medical Staff Made Aware DANIELLE Rossi   Assessment   Assessment Pt seen for OT tx session with focus on functional balance, functional mobility, ADL status, and transfer safety  Patient agreeable to OT treatment session  Pt received supine in bed  Pt required Verbal Cue x1 while recalling spinal precautions  Pt completed supine > sit with S s/p reviewing log roll technique  Educated pt on importance of practicing bed transfers to simulate at home, pt declines this session & requests HOB remain up  Pt performed all sit <> stands with CGA & RW  Pt completed functional mobility with CGA & RW  Pt originally with flexed posture while mobilizing, reports this was baseline  Pt then straightened posture  Pt performed all stand > sits with S & armrests  Pt able to tolerate sitting edge of recliner w/o back support for approx 15 min  While sitting, pt thoroughly education on changing [employer] collar <> [place] collar, as well changing [place] Collar pads  Reviewed cervical collar handout with pt  Pt completed stand > sit with CGA & c/o RLE muscle cramp  Min Ax1 for steadiness  Pt performed stand pivot to EOB, S for stand > sit with bed rails  Pt returned to supine with S & increased time  Encourage pt mobilize TID & utilize BR with nursing staff for safety  Patient continues to be functioning below baseline level, occupational performance remains limited secondary to factors listed above, and pt at increased risk for falls and injury  The patient's raw score on the AM-PAC Daily Activity inpatient short form is 20, standardized score is 42 03, greater than 39 4  Patients at this level are likely to benefit from DC to home  Please refer to the recommendation of the Occupational Therapist for safe DC planning  From OT standpoint, recommendation at time of D/C would be Home OT to address independence in ADL performs & functional mobility/transfers in the home  Patient to benefit from continued Occupational Therapy treatment while in the hospital to address deficits as defined above and maximize level of functional independence with ADLs and functional mobility  Pt left supine in bed with call bell in reach, tray table in reach, needs met, RN informed  Plan   Treatment Interventions ADL retraining;Functional transfer training;UE strengthening/ROM; Endurance training;Patient/family training;Equipment evaluation/education; Compensatory technique education;Continued evaluation   Goal Expiration Date 07/27/22   OT Treatment Day 1   OT Frequency 3-5x/wk   Recommendation   OT Discharge Recommendation Home with home health rehabilitation  (Pt reports S/O is limited in assisting pt)   Equipment Recommended Shower/Tub chair with back ($)   AM-PAC Daily Activity Inpatient   Lower Body Dressing 3   Bathing 2   Toileting 4   Upper Body Dressing 3   Grooming 4   Eating 4   Daily Activity Raw Score 20   Daily Activity Standardized Score (Calc for Raw Score >=11) 42 03   AM-PAC Applied Cognition Inpatient   Following a Speech/Presentation 4   Understanding Ordinary Conversation 4   Taking Medications 4   Remembering Where Things Are Placed or Put Away 4   Remembering List of 4-5 Errands 4   Taking Care of Complicated Tasks 4   Applied Cognition Raw Score 24   Applied Cognition Standardized Score 62 21     Clara Lewis, OTR/L

## 2022-07-21 NOTE — PLAN OF CARE
Problem: PAIN - ADULT  Goal: Verbalizes/displays adequate comfort level or baseline comfort level  Description: Interventions:  - Encourage patient to monitor pain and request assistance  - Assess pain using appropriate pain scale  - Administer analgesics based on type and severity of pain and evaluate response  - Implement non-pharmacological measures as appropriate and evaluate response  - Consider cultural and social influences on pain and pain management  - Notify physician/advanced practitioner if interventions unsuccessful or patient reports new pain  Outcome: Progressing     Problem: SAFETY ADULT  Goal: Maintain or return to baseline ADL function  Description: INTERVENTIONS:  -  Assess patient's ability to carry out ADLs; assess patient's baseline for ADL function and identify physical deficits which impact ability to perform ADLs (bathing, care of mouth/teeth, toileting, grooming, dressing, etc )  - Assess/evaluate cause of self-care deficits   - Assess range of motion  - Assess patient's mobility; develop plan if impaired  - Assess patient's need for assistive devices and provide as appropriate  - Encourage maximum independence but intervene and supervise when necessary  - Involve family in performance of ADLs  - Assess for home care needs following discharge   - Consider OT consult to assist with ADL evaluation and planning for discharge  - Provide patient education as appropriate  Outcome: Progressing     Problem: INFECTION - ADULT  Goal: Absence or prevention of progression during hospitalization  Description: INTERVENTIONS:  - Assess and monitor for signs and symptoms of infection  - Monitor lab/diagnostic results  - Monitor all insertion sites, i e  indwelling lines, tubes, and drains  - Monitor endotracheal if appropriate and nasal secretions for changes in amount and color  - [place] appropriate cooling/warming therapies per order  - Administer medications as ordered  - Instruct and encourage patient and family to use good hand hygiene technique  - Identify and instruct in appropriate isolation precautions for identified infection/condition  Outcome: Progressing     Problem: INFECTION - ADULT  Goal: Absence of fever/infection during neutropenic period  Description: INTERVENTIONS:  - Monitor WBC    Outcome: Progressing     Problem: DISCHARGE PLANNING  Goal: Discharge to home or other facility with appropriate resources  Description: INTERVENTIONS:  - Identify barriers to discharge w/patient and caregiver  - Arrange for needed discharge resources and transportation as appropriate  - Identify discharge learning needs (meds, wound care, etc )  - Arrange for interpretive services to assist at discharge as needed  - Refer to Case Management Department for coordinating discharge planning if the patient needs post-hospital services based on physician/advanced practitioner order or complex needs related to functional status, cognitive ability, or social support system  Outcome: Progressing     Problem: Knowledge Deficit  Goal: Patient/family/caregiver demonstrates understanding of disease process, treatment plan, medications, and discharge instructions  Description: Complete learning assessment and assess knowledge base  Interventions:  - Provide teaching at level of understanding  - Provide teaching via preferred learning methods  Outcome: Progressing     Problem: Nutrition/Hydration-ADULT  Goal: Nutrient/Hydration intake appropriate for improving, restoring or maintaining nutritional needs  Description: Monitor and assess patient's nutrition/hydration status for malnutrition  Collaborate with interdisciplinary team and initiate plan and interventions as ordered  Monitor patient's weight and dietary intake as ordered or per policy  Utilize nutrition screening tool and intervene as necessary  Determine patient's food preferences and provide high-protein, high-caloric foods as appropriate       INTERVENTIONS:  - Monitor oral intake, urinary output, labs, and treatment plans  - Assess nutrition and hydration status and recommend course of action  - Evaluate amount of meals eaten  - Assist patient with eating if necessary   - Allow adequate time for meals  - Recommend/ encourage appropriate diets, oral nutritional supplements, and vitamin/mineral supplements  - Order, calculate, and assess calorie counts as needed  - Recommend, monitor, and adjust tube feedings and TPN/PPN based on assessed needs  - Assess need for intravenous fluids  - Provide specific nutrition/hydration education as appropriate  - Include patient/family/caregiver in decisions related to nutrition  Outcome: Progressing

## 2022-07-22 PROCEDURE — 97110 THERAPEUTIC EXERCISES: CPT

## 2022-07-22 PROCEDURE — 97116 GAIT TRAINING THERAPY: CPT

## 2022-07-22 PROCEDURE — 99024 POSTOP FOLLOW-UP VISIT: CPT | Performed by: PHYSICIAN ASSISTANT

## 2022-07-22 PROCEDURE — 97530 THERAPEUTIC ACTIVITIES: CPT

## 2022-07-22 RX ORDER — POLYETHYLENE GLYCOL 3350 17 G/17G
17 POWDER, FOR SOLUTION ORAL DAILY PRN
Status: DISCONTINUED | OUTPATIENT
Start: 2022-07-22 | End: 2022-07-23 | Stop reason: HOSPADM

## 2022-07-22 RX ADMIN — GABAPENTIN 100 MG: 100 CAPSULE ORAL at 21:39

## 2022-07-22 RX ADMIN — GABAPENTIN 100 MG: 100 CAPSULE ORAL at 09:21

## 2022-07-22 RX ADMIN — DOCUSATE SODIUM 100 MG: 100 CAPSULE, LIQUID FILLED ORAL at 09:21

## 2022-07-22 RX ADMIN — DOCUSATE SODIUM 100 MG: 100 CAPSULE, LIQUID FILLED ORAL at 18:14

## 2022-07-22 RX ADMIN — DIAZEPAM 2.5 MG: 10 INJECTION, SOLUTION INTRAMUSCULAR; INTRAVENOUS at 13:38

## 2022-07-22 RX ADMIN — OXYCODONE HYDROCHLORIDE 2.5 MG: 5 TABLET ORAL at 15:51

## 2022-07-22 RX ADMIN — GABAPENTIN 100 MG: 100 CAPSULE ORAL at 15:51

## 2022-07-22 RX ADMIN — PANTOPRAZOLE SODIUM 40 MG: 40 TABLET, DELAYED RELEASE ORAL at 05:55

## 2022-07-22 RX ADMIN — METHOCARBAMOL 750 MG: 750 TABLET ORAL at 21:47

## 2022-07-22 RX ADMIN — DIAZEPAM 2.5 MG: 10 INJECTION, SOLUTION INTRAMUSCULAR; INTRAVENOUS at 21:46

## 2022-07-22 RX ADMIN — ACETAMINOPHEN 975 MG: 325 TABLET ORAL at 13:37

## 2022-07-22 RX ADMIN — HEPARIN SODIUM 5000 UNITS: 5000 INJECTION INTRAVENOUS; SUBCUTANEOUS at 05:55

## 2022-07-22 RX ADMIN — METHOCARBAMOL 750 MG: 750 TABLET ORAL at 03:40

## 2022-07-22 RX ADMIN — POLYETHYLENE GLYCOL 3350 17 G: 17 POWDER, FOR SOLUTION ORAL at 09:22

## 2022-07-22 RX ADMIN — ACETAMINOPHEN 975 MG: 325 TABLET ORAL at 05:56

## 2022-07-22 RX ADMIN — SENNOSIDES 8.6 MG: 8.6 TABLET, FILM COATED ORAL at 09:21

## 2022-07-22 RX ADMIN — FINASTERIDE 5 MG: 5 TABLET, FILM COATED ORAL at 09:21

## 2022-07-22 RX ADMIN — ACETAMINOPHEN 975 MG: 325 TABLET ORAL at 21:37

## 2022-07-22 RX ADMIN — OXYCODONE HYDROCHLORIDE 2.5 MG: 5 TABLET ORAL at 03:47

## 2022-07-22 NOTE — PLAN OF CARE
Problem: PAIN - ADULT  Goal: Verbalizes/displays adequate comfort level or baseline comfort level  Description: Interventions:  - Encourage patient to monitor pain and request assistance  - Assess pain using appropriate pain scale  - Administer analgesics based on type and severity of pain and evaluate response  - Implement non-pharmacological measures as appropriate and evaluate response  - Consider cultural and social influences on pain and pain management  - Notify physician/advanced practitioner if interventions unsuccessful or patient reports new pain  Outcome: Progressing     Problem: INFECTION - ADULT  Goal: Absence or prevention of progression during hospitalization  Description: INTERVENTIONS:  - Assess and monitor for signs and symptoms of infection  - Monitor lab/diagnostic results  - Monitor all insertion sites, i e  indwelling lines, tubes, and drains  - Monitor endotracheal if appropriate and nasal secretions for changes in amount and color  - [place] appropriate cooling/warming therapies per order  - Administer medications as ordered  - Instruct and encourage patient and family to use good hand hygiene technique  - Identify and instruct in appropriate isolation precautions for identified infection/condition  Outcome: Progressing     Problem: SAFETY ADULT  Goal: Maintain or return to baseline ADL function  Description: INTERVENTIONS:  -  Assess patient's ability to carry out ADLs; assess patient's baseline for ADL function and identify physical deficits which impact ability to perform ADLs (bathing, care of mouth/teeth, toileting, grooming, dressing, etc )  - Assess/evaluate cause of self-care deficits   - Assess range of motion  - Assess patient's mobility; develop plan if impaired  - Assess patient's need for assistive devices and provide as appropriate  - Encourage maximum independence but intervene and supervise when necessary  - Involve family in performance of ADLs  - Assess for home care needs following discharge   - Consider OT consult to assist with ADL evaluation and planning for discharge  - Provide patient education as appropriate  Outcome: Progressing     Problem: DISCHARGE PLANNING  Goal: Discharge to home or other facility with appropriate resources  Description: INTERVENTIONS:  - Identify barriers to discharge w/patient and caregiver  - Arrange for needed discharge resources and transportation as appropriate  - Identify discharge learning needs (meds, wound care, etc )  - Arrange for interpretive services to assist at discharge as needed  - Refer to Case Management Department for coordinating discharge planning if the patient needs post-hospital services based on physician/advanced practitioner order or complex needs related to functional status, cognitive ability, or social support system  Outcome: Progressing     Problem: Knowledge Deficit  Goal: Patient/family/caregiver demonstrates understanding of disease process, treatment plan, medications, and discharge instructions  Description: Complete learning assessment and assess knowledge base    Interventions:  - Provide teaching at level of understanding  - Provide teaching via preferred learning methods  Outcome: Progressing

## 2022-07-22 NOTE — PLAN OF CARE
Problem: PHYSICAL THERAPY ADULT  Goal: Performs mobility at highest level of function for planned discharge setting  See evaluation for individualized goals  Description: Treatment/Interventions: Functional transfer training, LE strengthening/ROM, Elevations, Therapeutic exercise, Endurance training, Patient/family training, Equipment eval/education, Bed mobility, Gait training  Equipment Recommended: Jayda Duval       See flowsheet documentation for full assessment, interventions and recommendations  Outcome: Progressing  Note: Prognosis: Fair  Problem List: Decreased strength, Decreased range of motion, Decreased endurance, Impaired balance, Orthopedic restrictions, Pain, Impaired sensation, Decreased skin integrity  Assessment: pt began tx session lying supine in the bed and was agreeable to participate in PT intervention  pt continues to remain consistant with /s for all bed mobility and functional transfers as pt utilized log roll to complete a supine<>sit EOB transfer and required VC's to complete a STS from EOB to RW  pt was able to perform 5 STS in todays tx session in order to strengthen LE's and increase endurance and safety with all OOB functional transfers  progress was noted this tx session as pt was able to increase activity tolerance and ambulation distance as pt ambulated 310'x1 RW w/o LOB  pt did demonstrate fatigue around 250'x1 RW but was able to finish w/o LOB  pt was able to complete 11 steps in todays tx session with /s and no LOB as pt used bilateral hand rails for safety and balance  post tx pt in bed with call bell and slight increase in pain 7/10 neck, R shoulder and R arm  Barriers to Discharge: (S) Inaccessible home environment     PT Discharge Recommendation: Home with outpatient rehabilitation    See flowsheet documentation for full assessment

## 2022-07-22 NOTE — PLAN OF CARE
Problem: PAIN - ADULT  Goal: Verbalizes/displays adequate comfort level or baseline comfort level  Description: Interventions:  - Encourage patient to monitor pain and request assistance  - Assess pain using appropriate pain scale  - Administer analgesics based on type and severity of pain and evaluate response  - Implement non-pharmacological measures as appropriate and evaluate response  - Consider cultural and social influences on pain and pain management  - Notify physician/advanced practitioner if interventions unsuccessful or patient reports new pain  Outcome: Progressing     Problem: INFECTION - ADULT  Goal: Absence or prevention of progression during hospitalization  Description: INTERVENTIONS:  - Assess and monitor for signs and symptoms of infection  - Monitor lab/diagnostic results  - Monitor all insertion sites, i e  indwelling lines, tubes, and drains  - Monitor endotracheal if appropriate and nasal secretions for changes in amount and color  - [place] appropriate cooling/warming therapies per order  - Administer medications as ordered  - Instruct and encourage patient and family to use good hand hygiene technique  - Identify and instruct in appropriate isolation precautions for identified infection/condition  Outcome: Progressing  Goal: Absence of fever/infection during neutropenic period  Description: INTERVENTIONS:  - Monitor WBC    Outcome: Progressing     Problem: SAFETY ADULT  Goal: Patient will remain free of falls  Description: INTERVENTIONS:  - Educate patient/family on patient safety including physical limitations  - Instruct patient to call for assistance with activity   - Consult OT/PT to assist with strengthening/mobility   - Keep Call bell within reach  - Keep bed low and locked with side rails adjusted as appropriate  - Keep care items and personal belongings within reach  - Initiate and maintain comfort rounds  - Make Fall Risk Sign visible to staff  - Offer Toileting every 2 Hours, in advance of need  - Initiate/Maintain alarm  - Obtain necessary fall risk management equipment  - Apply yellow socks and bracelet for high fall risk patients  - Consider moving patient to room near nurses station  Outcome: Progressing  Goal: Maintain or return to baseline ADL function  Description: INTERVENTIONS:  -  Assess patient's ability to carry out ADLs; assess patient's baseline for ADL function and identify physical deficits which impact ability to perform ADLs (bathing, care of mouth/teeth, toileting, grooming, dressing, etc )  - Assess/evaluate cause of self-care deficits   - Assess range of motion  - Assess patient's mobility; develop plan if impaired  - Assess patient's need for assistive devices and provide as appropriate  - Encourage maximum independence but intervene and supervise when necessary  - Involve family in performance of ADLs  - Assess for home care needs following discharge   - Consider OT consult to assist with ADL evaluation and planning for discharge  - Provide patient education as appropriate  Outcome: Progressing  Goal: Maintains/Returns to pre admission functional level  Description: INTERVENTIONS:  - Perform BMAT or MOVE assessment daily    - Set and communicate daily mobility goal to care team and patient/family/caregiver  - Collaborate with rehabilitation services on mobility goals if consulted  - Perform Range of Motion 3 times a day  - Reposition patient every 2 hours    - Dangle patient 3 times a day  - Stand patient 3 times a day  - Ambulate patient 3 times a day  - Out of bed to chair 3 times a day   - Out of bed for meals 3 times a day  - Out of bed for toileting  - Record patient progress and toleration of activity level   Outcome: Progressing     Problem: DISCHARGE PLANNING  Goal: Discharge to home or other facility with appropriate resources  Description: INTERVENTIONS:  - Identify barriers to discharge w/patient and caregiver  - Arrange for needed discharge resources and transportation as appropriate  - Identify discharge learning needs (meds, wound care, etc )  - Arrange for interpretive services to assist at discharge as needed  - Refer to Case Management Department for coordinating discharge planning if the patient needs post-hospital services based on physician/advanced practitioner order or complex needs related to functional status, cognitive ability, or social support system  Outcome: Progressing     Problem: Knowledge Deficit  Goal: Patient/family/caregiver demonstrates understanding of disease process, treatment plan, medications, and discharge instructions  Description: Complete learning assessment and assess knowledge base  Interventions:  - Provide teaching at level of understanding  - Provide teaching via preferred learning methods  Outcome: Progressing     Problem: Potential for Falls  Goal: Patient will remain free of falls  Description: INTERVENTIONS:  - Educate patient/family on patient safety including physical limitations  - Instruct patient to call for assistance with activity   - Consult OT/PT to assist with strengthening/mobility   - Keep Call bell within reach  - Keep bed low and locked with side rails adjusted as appropriate  - Keep care items and personal belongings within reach  - Initiate and maintain comfort rounds  - Make Fall Risk Sign visible to staff  - Offer Toileting every 2 Hours, in advance of need  - Initiate/Maintain alarm  - Obtain necessary fall risk management equipment  - Apply yellow socks and bracelet for high fall risk patients  - Consider moving patient to room near nurses station  Outcome: Progressing     Problem: Nutrition/Hydration-ADULT  Goal: Nutrient/Hydration intake appropriate for improving, restoring or maintaining nutritional needs  Description: Monitor and assess patient's nutrition/hydration status for malnutrition  Collaborate with interdisciplinary team and initiate plan and interventions as ordered    Monitor patient's weight and dietary intake as ordered or per policy  Utilize nutrition screening tool and intervene as necessary  Determine patient's food preferences and provide high-protein, high-caloric foods as appropriate       INTERVENTIONS:  - Monitor oral intake, urinary output, labs, and treatment plans  - Assess nutrition and hydration status and recommend course of action  - Evaluate amount of meals eaten  - Assist patient with eating if necessary   - Allow adequate time for meals  - Recommend/ encourage appropriate diets, oral nutritional supplements, and vitamin/mineral supplements  - Order, calculate, and assess calorie counts as needed  - Recommend, monitor, and adjust tube feedings and TPN/PPN based on assessed needs  - Assess need for intravenous fluids  - Provide specific nutrition/hydration education as appropriate  - Include patient/family/caregiver in decisions related to nutrition  Outcome: Progressing

## 2022-07-22 NOTE — PHYSICAL THERAPY NOTE
PHYSICAL THERAPY NOTE          Patient Name: Vazquez Dickens  APJAEPS'R Date: 22 1338   PT Last Visit   PT Visit Date 22   Note Type   Note Type Treatment   Pain Assessment   Pain Assessment Tool 0-10   Pain Score 7  (510 pre tx session ans 10 post tx session)   Pain Location/Orientation Location: Neck; Location: Shoulder; Location: Arm   Pain Onset/Description Onset: Ongoing   Effect of Pain on Daily Activities limited activity tolerance   Patient's Stated Pain Goal No pain   Hospital Pain Intervention(s) Repositioned; Ambulation/increased activity; Emotional support; Rest   Multiple Pain Sites No   Pain Rating: FLACC (Rest) - Face 0   Pain Rating: FLACC (Rest) - Legs 0   Pain Rating: FLACC (Rest) - Activity 0   Pain Rating: FLACC (Rest) - Cry 0   Pain Rating: FLACC (Rest) - Consolability 0   Score: FLACC (Rest) 0   Restrictions/Precautions   Weight Bearing Precautions Per Order No   Braces or Orthoses C/S Collar  ([place] at all times)   Other Precautions Fall Risk;Pain;Spinal precautions   General   Chart Reviewed Yes   Response to Previous Treatment Patient with no complaints from previous session  Family/Caregiver Present No   Cognition   Overall Cognitive Status WFL   Arousal/Participation Alert; Responsive; Cooperative   Attention Within functional limits   Orientation Level Oriented X4   Memory Within functional limits   Following Commands Follows all commands and directions without difficulty   Comments pt identified by name and    Subjective   Subjective pt was agreeable to participate in PT intervention   Bed Mobility   Supine to Sit 5  Supervision   Additional items Assist x 1; Increased time required;Verbal cues   Sit to Supine 5  Supervision   Additional items Assist x 1; Increased time required;Verbal cues   Additional Comments pt was able to sit EOB w/o LOB >10 minutes w/o increased pain Transfers   Sit to Stand 5  Supervision   Additional items Assist x 1; Increased time required;Verbal cues  (w/ RW)   Stand to Sit 5  Supervision   Additional items Assist x 1; Increased time required;Verbal cues  (w/ RW)   Stand pivot 5  Supervision   Additional items Assist x 1; Increased time required;Verbal cues  (w/ RW)   Additional Comments pt continues to require RW to complete allfunctional transfers in todays tx session for safety and balance   Ambulation/Elevation   Gait pattern Decreased foot clearance; Short stride; Excessively slow;Knees flexed;Decreased heel strike;Decreased toe off   Gait Assistance 5  Supervision   Additional items Assist x 1;Verbal cues   Assistive Device Rolling walker   Distance 310'x1 RW   Stair Management Assistance 5  Supervision   Additional items Assist x 1;Verbal cues; Increased time required   Stair Management Technique Two rails; Step to pattern; Foreward;Backward   Number of Stairs 11   Balance   Static Sitting Fair   Dynamic Sitting Fair -   Static Standing Fair -   Dynamic Standing Fair -   Ambulatory Fair -   Endurance Deficit   Endurance Deficit Yes   Endurance Deficit Description pt with limited ambulation distance as pt stated he is getting tired around 250'x1 RW   Activity Tolerance   Activity Tolerance Patient limited by fatigue;Patient limited by pain   Nurse Made Aware Spoke to RN   Exercises   Hip Abduction Sitting;20 reps;AROM; Bilateral   Hip Adduction Sitting;20 reps;AROM; Bilateral   Knee AROM Long Arc Quad Sitting;15 reps;AROM; Bilateral   Ankle Pumps Sitting;20 reps;AROM; Bilateral   Marching Sitting;10 reps;AROM; Bilateral   Balance training  standing marches w/ RW UE support as pt completed x10 w/o LOB   Assessment   Prognosis Fair   Problem List Decreased strength;Decreased range of motion;Decreased endurance; Impaired balance;Orthopedic restrictions;Pain; Impaired sensation;Decreased skin integrity   Assessment pt began tx session lying supine in the bed and was agreeable to participate in PT intervention  pt continues to remain consistant with /s for all bed mobility and functional transfers as pt utilized log roll to complete a supine<>sit EOB transfer and required VC's to complete a STS from EOB to RW  pt was able to perform 5 STS in todays tx session in order to strengthen LE's and increase endurance and safety with all OOB functional transfers  progress was noted this tx session as pt was able to increase activity tolerance and ambulation distance as pt ambulated 310'x1 RW w/o LOB  pt did demonstrate fatigue around 250'x1 RW but was able to finish w/o LOB  pt was able to complete 11 steps in todays tx session with /s and no LOB as pt used bilateral hand rails for safety and balance  post tx pt in bed with call bell and slight increase in pain 7/10 neck, R shoulder and R arm  Goals   Patient Goals to have less pain   STG Expiration Date 07/30/22   PT Treatment Day 3   Plan   Treatment/Interventions Functional transfer training;LE strengthening/ROM; Therapeutic exercise; Endurance training;Patient/family training;Equipment eval/education; Bed mobility;Gait training;Spoke to nursing;Elevations   Progress Progressing toward goals   PT Frequency 5-7x/wk   Recommendation   PT Discharge Recommendation Home with outpatient rehabilitation   Equipment Recommended [facility] Recommended Wheeled walker   Change/add to [employer]?  No   AM-PAC Basic Mobility Inpatient   Turning in Bed Without Bedrails 3   Lying on Back to Sitting on Edge of Flat Bed 3   Moving Bed to Chair 3   Standing Up From Chair 3   Walk in Room 3   Climb 3-5 Stairs 2   Basic Mobility Inpatient Raw Score 17   Basic Mobility Standardized Score 39 67   Highest Level Of Mobility   JH-HLM Goal 5: Stand one or more mins   JH-HLM Achieved 8: Walk 250 feet ot more   Education   Education Provided Mobility training;Assistive device   Patient Demonstrates acceptance/verbal understanding   End of Consult Patient Position at End of Consult Supine; All needs within reach;Bed/Chair alarm activated   The patient's AM-PAC Basic Mobility Inpatient Short Form Raw Score is 17  A Raw score of greater than 16 suggests the patient may benefit from discharge to home  Please also refer to the recommendation of the Physical Therapist for safe discharge planning       Preston Sanchez, ANDREW

## 2022-07-22 NOTE — PROGRESS NOTES
Progress Note - Neurosurgery   Ekaterina Allison 46 y o  male MRN: [id number]  Unit/Bed#: S -01 Encounter: [id number]        Assessment:  1  S/P#3 C2-T1 PCDF, C3-6 Lami ( Dr Dave Guerra)  2  Spondylosis of cervical spine with Myelopathy  3  Cervical SC compression     Plan:  · Exam:  A&OX3, EOMI 2 mm conj bilaterally  SF, communicates well, SF  [place], right shoulder abduction weakness   3+ /5 with difficulty abducting, and maintaining outstretched right arm,  5/5 and IO strength 5/5 in  LE DF/PF, stiffness & increased tone in lower limbs stable  Improved spasm and tremors  Sensation to LT intact bilaterally, slightly decreased on finger tips, R>L  DTR 3+  Positive Lancatser's test bilaterally   No clonus bilaterally  Dressing clean and dry, ROYAL drain OP=95ml/24h darkish body fluid  [place] collar on  · Imaging reviewed personally and by attending  Final results as below:  ? Post op Upright Cx spine xrays in brace shows post op changes with good construction and stable hardware  · Pain control:  1  Tylenol 975 mg oral Q8H, scheduled-pain  2  Gabapentin 100 mg oral 3 times daily  3  Oxycodone 5 mg oral q 4h p r n  severe pain  4  Oxycodone 2 5 mg oral q 4h p r n  moderate pain  5  Valium 5mg IV Q8H, PRN  · Mobilize-As tolerated   · PT/OT eval and treatment-Recommends Home with OP PT  · DVT PPX: SCDs, bilateral legs, with hold HSQ  · Medical Mx-   ? BPH on Finasteride, 5 mg oral /d-[place] cath removed, started voiding urine, no complaint  ? Didn't have BM, passed some gas-encourage eating and drinking fluid, antacid, laxative  Add Miralax   · ROYAL drains OP=95/24h  · Patient is feeling better overall, with some of his sensory and motor, improved stiffness and tremor and able to participate in PT, stable left C4-5 weakness   Given his long standing myelopathy, patient may need continuous extensive PT/OT to improve his gait, right arm weakness, etc  PT recommends home with Out patient PT, will discuss with PT  Patient says he is not feeling ready to go home, because of lack of support  His GF had hip replacement recently- will discuss with CM  Withhold HSQ-Pull out drain in the afternoon  Continue Pain control,  PT/OT, wearing collar  Continue following as primary  Call with question or concern  Subjective/Objective      Chief Complaint: " I am tired, wants to get some sleep, feeling uncomfortable in brace" 3rd post op day    Subjective: Patient reports he is somewhat tired, and didn't sleep, because of the brace-make him uncomfortable, c/o moderate incisional pain  Weakness of right UE, improved stiffness and tremor in the LLEs, improved numbness and paresthesia, residual on finger tips  Appetite is good, no urinary complaints  Patient was OOB doing PT  He didn't have BM yet  No fever, chills, rigors, cough or chest pain  Denies drainage or discharge from the incision site  Wearing [place] vista collar, and SCDs bilateral legs  Objective:  Patient is sleepy, but awoke and communicates well and  cooperative     I/O       07/20 0701  07/21 0700 07/21 0701  07/22 0700    P  O  120 480    Total Intake(mL/kg) 120 (1 4) 480 (5 4)    Urine (mL/kg/hr) 2150 (1 1) 1050 (0 5)    Drains 150 95    Total Output 2300 1145    Net -[phone]                Invasive Devices  Report    Peripheral Intravenous Line  Duration           Peripheral IV Left;Proximal;Ventral (anterior) Forearm -- days    Peripheral IV 07/19/22 Left Forearm 2 days          Drain  Duration           Closed/Suction Drain Posterior;Midline Neck Bulb 2 days    Urethral Catheter Non-latex 16 Fr  2 days                Physical Exam:  Vitals: Blood pressure 143/74, pulse 70, temperature 98 5 °F (36 9 °C), temperature source Oral, resp  rate 18, height 6' (1 829 m), weight 89 kg (196 lb 3 4 oz), SpO2 97 %  ,Body mass index is 26 61 kg/m²          General appearance: sleepy today,  appears stated age, cooperative and no distress  Head: Normocephalic, without obvious abnormality, atraumatic  Eyes: EOMI, conj bilat  Neck: Dressing clean and dry, ROYAL drain in place  [employer] collar on  Back: no kyphosis present, no tenderness to percussion or palpation  Lungs: non labored breathing  Heart: regular heart rate  Neurologic:   Mental status: Alert, oriented x3, thought content appropriate  Cranial nerves: grossly intact (Cranial nerves II-XII)  Sensory: normal to LT, except slight residual on finger tips  Motor:see exam above  Reflexes: 3+ and symmetric  No clonus   Lancaster's positive bilaterally  Coordination: finger to nose normal left side, limited on the right duet to weakness in R arm      Lab Results:  Results from last 7 days   Lab Units 07/21/22  1351 07/20/22  0804 07/19/22  1836   WBC Thousand/uL 11 07* 13 15*  --    HEMOGLOBIN g/dL 13 9 13 1  --    HEMATOCRIT % 41 3 40 2  --    PLATELETS Thousands/uL 276 278 305   NEUTROS PCT % 74  --   --    MONOS PCT % 9  --   --      Results from last 7 days   Lab Units 07/21/22  1351 07/20/22  0926   POTASSIUM mmol/L 3 8 3 8   CHLORIDE mmol/L 103 103   CO2 mmol/L 27 30   BUN mg/dL 7 9   CREATININE mg/dL 0 85 0 93   CALCIUM mg/dL 9 0 9 0                 No results found for: TROPONINT  ABG:No results found for: PHART, IBZ2PTZ, PO2ART, PFY4QSG, C5HWVFYG, BEART, SOURCE    Imaging Studies: I have personally reviewed pertinent reports  and I have personally reviewed pertinent films in PACS    EKG, Pathology, and Other Studies: I have personally reviewed pertinent reports        VTE Pharmacologic Prophylaxis: Heparin SQ-with hold     VTE Mechanical Prophylaxis: sequential compression device

## 2022-07-23 VITALS
BODY MASS INDEX: 26.58 KG/M2 | OXYGEN SATURATION: 95 % | HEIGHT: 72 IN | SYSTOLIC BLOOD PRESSURE: 159 MMHG | DIASTOLIC BLOOD PRESSURE: 106 MMHG | TEMPERATURE: 98 F | WEIGHT: 196.21 LBS | HEART RATE: 77 BPM | RESPIRATION RATE: 18 BRPM

## 2022-07-23 PROCEDURE — 97530 THERAPEUTIC ACTIVITIES: CPT

## 2022-07-23 PROCEDURE — 99024 POSTOP FOLLOW-UP VISIT: CPT | Performed by: PHYSICIAN ASSISTANT

## 2022-07-23 PROCEDURE — 97116 GAIT TRAINING THERAPY: CPT

## 2022-07-23 PROCEDURE — 97110 THERAPEUTIC EXERCISES: CPT

## 2022-07-23 RX ORDER — METHOCARBAMOL 750 MG/1
750 TABLET, FILM COATED ORAL EVERY 6 HOURS PRN
Qty: 30 TABLET | Refills: 0 | Status: SHIPPED | OUTPATIENT
Start: 2022-07-23 | End: 2022-08-01 | Stop reason: SDUPTHER

## 2022-07-23 RX ORDER — ACETAMINOPHEN 325 MG/1
975 TABLET ORAL EVERY 8 HOURS SCHEDULED
Refills: 0
Start: 2022-07-23 | End: 2022-10-21 | Stop reason: ALTCHOICE

## 2022-07-23 RX ORDER — DOCUSATE SODIUM 100 MG/1
100 CAPSULE, LIQUID FILLED ORAL 2 TIMES DAILY
Refills: 0
Start: 2022-07-23

## 2022-07-23 RX ORDER — GABAPENTIN 100 MG/1
100 CAPSULE ORAL 3 TIMES DAILY
Qty: 90 CAPSULE | Refills: 0 | Status: SHIPPED | OUTPATIENT
Start: 2022-07-23 | End: 2022-08-10 | Stop reason: SDUPTHER

## 2022-07-23 RX ORDER — OXYCODONE HYDROCHLORIDE 5 MG/1
2.5-5 TABLET ORAL EVERY 4 HOURS PRN
Qty: 30 TABLET | Refills: 0 | Status: SHIPPED | OUTPATIENT
Start: 2022-07-23 | End: 2022-08-01 | Stop reason: SDUPTHER

## 2022-07-23 RX ADMIN — PANTOPRAZOLE SODIUM 40 MG: 40 TABLET, DELAYED RELEASE ORAL at 06:23

## 2022-07-23 RX ADMIN — FINASTERIDE 5 MG: 5 TABLET, FILM COATED ORAL at 09:09

## 2022-07-23 RX ADMIN — SENNOSIDES 8.6 MG: 8.6 TABLET, FILM COATED ORAL at 09:09

## 2022-07-23 RX ADMIN — GABAPENTIN 100 MG: 100 CAPSULE ORAL at 09:09

## 2022-07-23 RX ADMIN — OXYCODONE HYDROCHLORIDE 2.5 MG: 5 TABLET ORAL at 01:13

## 2022-07-23 RX ADMIN — HEPARIN SODIUM 5000 UNITS: 5000 INJECTION INTRAVENOUS; SUBCUTANEOUS at 06:24

## 2022-07-23 RX ADMIN — METHOCARBAMOL 750 MG: 750 TABLET ORAL at 06:27

## 2022-07-23 RX ADMIN — ACETAMINOPHEN 975 MG: 325 TABLET ORAL at 06:23

## 2022-07-23 RX ADMIN — DOCUSATE SODIUM 100 MG: 100 CAPSULE, LIQUID FILLED ORAL at 09:09

## 2022-07-23 NOTE — DISCHARGE INSTRUCTIONS
Discharge Instructions  Posterior cervical decompression and fixation/fusion      Surgical incisional care:  Keep incision clean and dry  Avoid applying creams, lotion or antiseptic to incision area  Check the wound daily  If the incision becomes red, swollen, tender, warm, or has increased drainage please notify physician immediately  Okay to apply a padded dressing over incision while wearing VISTA collar in order to reduce risk of irritation  May shower 3 days after surgery, but do not soak in a tub and no swimming  Use mild antimicrobial soap and water  Pat incision dry after showering  Cervical VISTA collar to be worn at all times except for showering  Change from VISTA (grey) collar to [place] collar prior to showering  Incision may be cleaned with water and a mild antimicrobial soap using a clean washcloth  Incision is to be gently patted dry with a clean towel  Once dry, collar should be changed back to a VISTA (grey) collar with clean pads in place  Hand wash collar pads with mild soap and water  They are to be laid flat to dry on a clean towel  Recommend changing every 1-2 days  Please refer to VISTA collar instructions for further details  Activity Restrictions:  No heavy lifting greater than 5 - 10lbs  No strenuous activities  May walk as tolerated  Encourage at least 4 short walks per day  No driving while requiring cervical collar, anticipated six weeks  No significant neck movement  Postoperative medication:  Please take pain medications to relieve incision pain, and muscle relaxants to prevent spasms as directed  Please see after visit summary (AVS) for details  Please take over the counter stool softeners such as colace or senna-s to avoid constipation while on narcotics  Do not take ibuprofen, Naproxen/Aleve or any NSAID until cleared by surgeon  May take Tylenol instead    If taking Coumadin, Aspirin, or Plavix, you may resume these medications when cleared by Neurosurgery  Follow-up as scheduled for a 2 week incision check  Follow-up 6 weeks after surgery with a repeat cervical spine upright x-rays to be completed prior to visit  **Please notify MD immediately if you experience a fever of 101  F, have increased neck or arm pain, new numbness and/or weakness in your arms, difficulty swallowing or breathing especially while lying down, numbness or weakness in arms or legs  **

## 2022-07-23 NOTE — PLAN OF CARE
Problem: PAIN - ADULT  Goal: Verbalizes/displays adequate comfort level or baseline comfort level  Description: Interventions:  - Encourage patient to monitor pain and request assistance  - Assess pain using appropriate pain scale  - Administer analgesics based on type and severity of pain and evaluate response  - Implement non-pharmacological measures as appropriate and evaluate response  - Consider cultural and social influences on pain and pain management  - Notify physician/advanced practitioner if interventions unsuccessful or patient reports new pain  Outcome: Adequate for Discharge     Problem: INFECTION - ADULT  Goal: Absence or prevention of progression during hospitalization  Description: INTERVENTIONS:  - Assess and monitor for signs and symptoms of infection  - Monitor lab/diagnostic results  - Monitor all insertion sites, i e  indwelling lines, tubes, and drains  - Monitor endotracheal if appropriate and nasal secretions for changes in amount and color  - [place] appropriate cooling/warming therapies per order  - Administer medications as ordered  - Instruct and encourage patient and family to use good hand hygiene technique  - Identify and instruct in appropriate isolation precautions for identified infection/condition  Outcome: Adequate for Discharge  Goal: Absence of fever/infection during neutropenic period  Description: INTERVENTIONS:  - Monitor WBC    Outcome: Adequate for Discharge     Problem: SAFETY ADULT  Goal: Patient will remain free of falls  Description: INTERVENTIONS:  - Educate patient/family on patient safety including physical limitations  - Instruct patient to call for assistance with activity   - Consult OT/PT to assist with strengthening/mobility   - Keep Call bell within reach  - Keep bed low and locked with side rails adjusted as appropriate  - Keep care items and personal belongings within reach  - Initiate and maintain comfort rounds  - Make Fall Risk Sign visible to staff  - Offer Toileting every 2 Hours, in advance of need  - Obtain necessary fall risk management equipment  - Apply yellow socks and bracelet for high fall risk patients  - Consider moving patient to room near nurses station  Outcome: Adequate for Discharge  Goal: Maintain or return to baseline ADL function  Description: INTERVENTIONS:  -  Assess patient's ability to carry out ADLs; assess patient's baseline for ADL function and identify physical deficits which impact ability to perform ADLs (bathing, care of mouth/teeth, toileting, grooming, dressing, etc )  - Assess/evaluate cause of self-care deficits   - Assess range of motion  - Assess patient's mobility; develop plan if impaired  - Assess patient's need for assistive devices and provide as appropriate  - Encourage maximum independence but intervene and supervise when necessary  - Involve family in performance of ADLs  - Assess for home care needs following discharge   - Consider OT consult to assist with ADL evaluation and planning for discharge  - Provide patient education as appropriate  Outcome: Adequate for Discharge  Goal: Maintains/Returns to pre admission functional level  Description: INTERVENTIONS:  - Perform BMAT or MOVE assessment daily    - Set and communicate daily mobility goal to care team and patient/family/caregiver  - Collaborate with rehabilitation services on mobility goals if consulted  - Perform Range of Motion 3 times a day  - Reposition patient every 2 hours    - Dangle patient 3 times a day  - Stand patient 3 times a day  - Ambulate patient 3 times a day  - Out of bed to chair 3 times a day   - Out of bed for meals 3 times a day  - Out of bed for toileting  - Record patient progress and toleration of activity level   Outcome: Adequate for Discharge     Problem: DISCHARGE PLANNING  Goal: Discharge to home or other facility with appropriate resources  Description: INTERVENTIONS:  - Identify barriers to discharge w/patient and caregiver  - Arrange for needed discharge resources and transportation as appropriate  - Identify discharge learning needs (meds, wound care, etc )  - Arrange for interpretive services to assist at discharge as needed  - Refer to Case Management Department for coordinating discharge planning if the patient needs post-hospital services based on physician/advanced practitioner order or complex needs related to functional status, cognitive ability, or social support system  Outcome: Adequate for Discharge     Problem: Knowledge Deficit  Goal: Patient/family/caregiver demonstrates understanding of disease process, treatment plan, medications, and discharge instructions  Description: Complete learning assessment and assess knowledge base  Interventions:  - Provide teaching at level of understanding  - Provide teaching via preferred learning methods  Outcome: Adequate for Discharge     Problem: Potential for Falls  Goal: Patient will remain free of falls  Description: INTERVENTIONS:  - Educate patient/family on patient safety including physical limitations  - Instruct patient to call for assistance with activity   - Consult OT/PT to assist with strengthening/mobility   - Keep Call bell within reach  - Keep bed low and locked with side rails adjusted as appropriate  - Keep care items and personal belongings within reach  - Initiate and maintain comfort rounds  - Make Fall Risk Sign visible to staff  - Offer Toileting every 2 Hours, in advance of need  - Obtain necessary fall risk management equipment:  - Apply yellow socks and bracelet for high fall risk patients  - Consider moving patient to room near nurses station  Outcome: Adequate for Discharge     Problem: Nutrition/Hydration-ADULT  Goal: Nutrient/Hydration intake appropriate for improving, restoring or maintaining nutritional needs  Description: Monitor and assess patient's nutrition/hydration status for malnutrition   Collaborate with interdisciplinary team and initiate plan and interventions as ordered  Monitor patient's weight and dietary intake as ordered or per policy  Utilize nutrition screening tool and intervene as necessary  Determine patient's food preferences and provide high-protein, high-caloric foods as appropriate       INTERVENTIONS:  - Monitor oral intake, urinary output, labs, and treatment plans  - Assess nutrition and hydration status and recommend course of action  - Evaluate amount of meals eaten  - Assist patient with eating if necessary   - Allow adequate time for meals  - Recommend/ encourage appropriate diets, oral nutritional supplements, and vitamin/mineral supplements  - Order, calculate, and assess calorie counts as needed  - Recommend, monitor, and adjust tube feedings and TPN/PPN based on assessed needs  - Assess need for intravenous fluids  - Provide specific nutrition/hydration education as appropriate  - Include patient/family/caregiver in decisions related to nutrition  Outcome: Adequate for Discharge

## 2022-07-23 NOTE — DISCHARGE SUMMARY
[facility]  Discharge- Ashley Ordonez 1970, 46 y o  male MRN: [id number]  Unit/Bed#: S -01 Encounter: [id number]  Primary Care Provider: Chikis Martin MD   Date and time admitted to hospital: 7/19/2022  7:08 AM    * Other spondylosis with myelopathy, cervical region  Assessment & Plan  POD4 POSTERIOR CERVICAL LAMINECTOMY C3-6, FIXATION FUSION C2-T1 (IMPULSE MONITORING)    Imaging:  · XR cervical spine, 7/20/22: Postsurgical changes with laminectomies noted from C3 through C6 with articular pillar screws and interconnecting rods from C2 through C7 with transpedicular screws at the level of T1  Plan:  · Upright XR reviewed  · Drain d/c 7/22/22 without difficulty  · Maintain VISTA collar at all times, isaac to shower  · Pain well controlled on current regimen  · Bowel regimen  · Home medications reordered  · Routine neuro checks  · PT/OT evaluation, recommend home with OPPT  · DVT ppx: SCDs, SQH    Patient is stable to d/c home today  D/w CM, will coordinate Lyft/Uber to get home as patient has no way home  F/u scheduled  Please call with questions or concerns  Medical Problems             Resolved Problems  Date Reviewed: 7/19/2022   None                 Discharge Date: 7/23/22    Admitting Diagnosis: Other spondylosis with myelopathy, cervical region [M47 12]  Myelopathy ([address] ) [G95 9]  Spinal cord compression ([address] ) [G95 20]    Discharge Diagnosis: same    Attending: Christiana Rowland Physician(s): none    Procedures Performed: POSTERIOR CERVICAL LAMINECTOMY C3-6, FIXATION FUSION C2-T1 (IMPULSE MONITORING)    Pathology: none    Hospital Course: Ashley Ordonez is a 47 y/o male who presented to the outpatient office complaining of worsening gait and difficulty holding on to objects with his hands  He also complained of bilateral distal upper extremity numbness and neck pain    Imaging revealed worsening cervical stenosis and cord compression as well as myelomalacia at C5  Patient underwent the above procedure under general anesthesia with minimal blood loss and no complications  Patient was kept in the PACU until stable and then moved to the floor  Patient received xray cervical spine postoperatively which revealed expected post op changes  ROYAL drain was placed perioperatively and removed POD3; patient tolerated well  PT and OT were consulted and recommend home with OPPT  He did complain of right hand numbness prior to discharge but no new pain or parethesias; exam remained stable  Patient was cleared medically for discharge  Prior to discharge, postoperative instructions were discussed with patient  During that time, all questions and concerns were addressed  Patient will follow up outpatient in 2 weeks for an incision check and 6 weeks with repeat cervical xray  Physical exam:  General appearance: alert, appears stated age, cooperative and no distress  Head: Normocephalic, without obvious abnormality, atraumatic  Eyes: EOMI, PERRL  Neck: VISTA collar in place, dressing CDI  Lungs: non labored breathing  Heart: regular heart rate  Neurologic:   Mental status: Alert, oriented, thought content appropriate, speech clear and fluent  Cranial nerves: grossly intact (Cranial nerves II-XII)  Sensory: normal to LT, decreased sensation right hand  Motor: moving all extremities  Right deltoid weakness 3/5  Reflexes: hoffmans positive bilaterally    Imaging:  XR chest pa & lateral    Result Date: 7/15/2022  Narrative: CHEST INDICATION:   M47 12: Other spondylosis with myelopathy, cervical region G95 9: Disease of spinal cord, unspecified G95 20: Unspecified cord compression  COMPARISON:  None EXAM PERFORMED/VIEWS:  XR CHEST PA & LATERAL Images: 4 FINDINGS: Cardiomediastinal silhouette appears unremarkable  The lungs are clear  No pneumothorax or pleural effusion  Osseous structures appear within normal limits for patient age       Impression: No acute cardiopulmonary disease  Workstation performed: [id number]     XR cervical spine 2 or 3 views    Result Date: 7/20/2022  Narrative: CERVICAL SPINE INDICATION:   post C2-C7 decompression and fusion  COMPARISON:  Correlation is made with the prior study dated 6/6/2022  VIEWS:  XR SPINE CERVICAL 2 OR 3 VW INJURY Images: 2 FINDINGS: Postsurgical changes with laminectomies noted from C3 through C6 with articular pillar screws and interconnecting rods from C2 through C7 with transpedicular screws at the level of T1  Surgical skin staples are seen posteriorly with posterior soft tissue gas likely from recent surgery  Overlying surgical drain as well posteriorly  No prevertebral soft tissue swelling  No acute fracture or spondylolisthesis seen  There is mild disc space narrowing from C4-C5 through C6-7 with degenerative osteophytes  Impression: Postsurgical changes as above  Workstation performed: [id number]     XR spine cervical 2 or 3 vw injury    Result Date: 7/19/2022  Narrative: C-ARM - CERVICAL SPINE INDICATION: Intraoperative localization for procedure guidance  Procedure guidance  COMPARISON:  MRI of the cervical spine from June 6, 2022 and x-ray of the cervical spine from June 22, 2021  TECHNIQUE: FLUOROSCOPY TIME:   32 seconds 8 FLUOROSCOPIC IMAGES FINDINGS: Fluoroscopic guidance provided for procedure guidance  Initial lateral intraoperative fluoroscopic image demonstrates a dorsally located probe at the C2 level  Subsequent images demonstrate posterior paired vertical michael and transarticular screw fixation  throughout the cervical spine  Surgical hardware is noted from the  C2-T1 level with pedicle screws at T1  There is maintained anatomic alignment  Osseous and soft tissue detail limited by technique  Impression: Fluoroscopic guidance provided for procedure guidance  Please refer to the separate procedure notes for additional details    Workstation performed: [id number]     Condition at Discharge: good     Discharge instructions/Information to patient and family:   See after visit summary for information provided to patient and family  Provisions for Follow-Up Care:  See after visit summary for information related to follow-up care and any pertinent home health orders  Disposition: Home        Planned Readmission: No    Discharge Statement   I spent 20 minutes discharging the patient  This time was spent on the day of discharge  I had direct contact with the patient on the day of discharge  Additional documentation is required if more than 30 minutes were spent on discharge  Discharge Medications:  See after visit summary for reconciled discharge medications provided to patient and family

## 2022-07-23 NOTE — ASSESSMENT & PLAN NOTE
POD4 POSTERIOR CERVICAL LAMINECTOMY C3-6, FIXATION FUSION C2-T1 (IMPULSE MONITORING)    Imaging:  · XR cervical spine, 7/20/22: Postsurgical changes with laminectomies noted from C3 through C6 with articular pillar screws and interconnecting rods from C2 through C7 with transpedicular screws at the level of T1  Plan:  · Upright XR reviewed  · Drain d/c 7/22/22 without difficulty  · Maintain VISTA collar at all times, isaac to shower  · Pain well controlled on current regimen  · Bowel regimen  · Home medications reordered  · Routine neuro checks  · PT/OT evaluation, recommend home with OPPT  · DVT ppx: SCDs, SQH    Patient is stable to d/c home today  D/w CM, will coordinate Lyft/Uber to get home as patient has no way home  F/u scheduled  Please call with questions or concerns

## 2022-07-23 NOTE — PLAN OF CARE
Problem: PHYSICAL THERAPY ADULT  Goal: Performs mobility at highest level of function for planned discharge setting  See evaluation for individualized goals  Description: Treatment/Interventions: Functional transfer training, LE strengthening/ROM, Elevations, Therapeutic exercise, Endurance training, Patient/family training, Equipment eval/education, Bed mobility, Gait training  Equipment Recommended: Delores Esparza       See flowsheet documentation for full assessment, interventions and recommendations  Outcome: Progressing  Note: Prognosis: Fair  Problem List: Decreased strength, Decreased range of motion, Decreased endurance, Impaired balance, Impaired sensation, Decreased skin integrity, Orthopedic restrictions, Pain  Assessment: pt began tx session seated OOB in the recliner and was agreeable to participate in PT intervention  progress was noted this tx session as pt was able to perfrorm all functional transfers to and from rW w/ a decrease in level of assistance required as pt completed 3 STS w/ mod I and 1 STS independently w/o use of arm rest  pt continues to progress with fucntional mobility and ambulation distnace as pt ambulated 330'x1 RW an ibcrease from previous tx sessions with /s for assistance  pt continues to perforn all stair trails safely with Bilateral UE support for safety and balance and /s with VC's for placing complete foot on initial step  post tx pt in recliner with call bell and all pt needs met  Barriers to Discharge: (S) Inaccessible home environment     PT Discharge Recommendation: Home with outpatient rehabilitation    See flowsheet documentation for full assessment

## 2022-07-23 NOTE — PHYSICAL THERAPY NOTE
PHYSICAL THERAPY NOTE          Patient Name: Ashley Ordonez  LPNHF'J Date: 22 1052   PT Last Visit   PT Visit Date 22   Note Type   Note Type Treatment   Pain Assessment   Pain Assessment Tool 0-10   Pain Score No Pain   Sy-Baker FACES Pain Rating 0   Pain Location/Orientation Orientation: Bilateral;Location: Neck   Pain Onset/Description Onset: Ongoing   Effect of Pain on Daily Activities limited activity tolerance with increase pain   Patient's Stated Pain Goal No pain   Hospital Pain Intervention(s) Repositioned; Ambulation/increased activity; Rest   Multiple Pain Sites No   Pain Rating: FLACC (Rest) - Face 0   Pain Rating: FLACC (Rest) - Legs 0   Pain Rating: FLACC (Rest) - Activity 0   Pain Rating: FLACC (Rest) - Cry 0   Pain Rating: FLACC (Rest) - Consolability 0   Score: FLACC (Rest) 0   Restrictions/Precautions   Weight Bearing Precautions Per Order No   Braces or Orthoses C/S Collar  ([place] at all times)   Other Precautions Fall Risk;Pain;Spinal precautions   General   Chart Reviewed Yes   Response to Previous Treatment Patient with no complaints from previous session  Family/Caregiver Present No   Cognition   Overall Cognitive Status WFL   Arousal/Participation Alert; Responsive; Cooperative   Attention Within functional limits   Orientation Level Oriented X4   Memory Within functional limits   Following Commands Follows all commands and directions without difficulty   Comments pt identified by anme and    Subjective   Subjective pt was agreeable to participate in PT intervention   pt stated no pain prior to PT intervention   Bed Mobility   Rolling R Unable to assess   Rolling L Unable to assess   Supine to Sit Unable to assess   Sit to Supine Unable to assess   Additional Comments pt seated OOB in the recliner pre/post tx session   Transfers   Sit to Stand 6  Modified independent Additional items Assist x 1; Armrests; Increased time required;Verbal cues  (w/ RW)   Stand to Sit 6  Modified independent   Additional items Assist x 1; Armrests; Increased time required;Verbal cues  (w/ RW)   Stand pivot 6  Modified independent   Additional items Assist x 1; Armrests; Increased time required;Verbal cues  (w/ RW)   Additional Comments pt continues to require RW for all functional transfers and ambulation in todays tx session for safety and balance   Ambulation/Elevation   Gait pattern Decreased foot clearance;Knees flexed; Excessively slow;Decreased heel strike;Decreased toe off   Gait Assistance 5  Supervision   Additional items Assist x 1;Verbal cues   Assistive Device Rolling walker   Distance 330'x1 RW   Stair Management Assistance 5  Supervision   Additional items Assist x 1;Verbal cues   Stair Management Technique Two rails; Step to pattern; Foreward;Backward   Number of Stairs 12   Balance   Static Sitting Fair   Dynamic Sitting Fair -   Static Standing Fair -   Dynamic Standing Fair -   Ambulatory Fair -   Endurance Deficit   Endurance Deficit Yes   Endurance Deficit Description limited ambulation distance as pt continues to fatigue around 250'x1 RW   Activity Tolerance   Activity Tolerance Patient limited by fatigue   Nurse Made Aware Spoke to RN   Exercises   Hip Abduction Sitting;20 reps;AROM; Bilateral   Hip Adduction Sitting;AROM; Bilateral  (pillow squeezes)   Knee AROM Long Arc Quad Sitting;20 reps;AROM; Bilateral   Ankle Pumps Sitting;20 reps;AROM; Bilateral   Marching Sitting;10 reps;AROM; Bilateral   Balance training  standing marched x20 with RW UE support   Assessment   Prognosis Fair   Problem List Decreased strength;Decreased range of motion;Decreased endurance; Impaired balance; Impaired sensation;Decreased skin integrity;Orthopedic restrictions;Pain   Assessment pt began tx session seated OOB in the recliner and was agreeable to participate in PT intervention   progress was noted this tx session as pt was able to perfrorm all functional transfers to and from rW w/ a decrease in level of assistance required as pt completed 3 STS w/ mod I and 1 STS independently w/o use of arm rest  pt continues to progress with fucntional mobility and ambulation distnace as pt ambulated 330'x1 RW an ibcrease from previous tx sessions with /s for assistance  pt continues to perforn all stair trails safely with Bilateral UE support for safety and balance and /s with VC's for placing complete foot on initial step  post tx pt in recliner with call bell and all pt needs met  Goals   Patient Goals to go home   STG Expiration Date 07/30/22   PT Treatment Day 4   Plan   Treatment/Interventions Functional transfer training;LE strengthening/ROM; Elevations; Therapeutic exercise; Endurance training;Patient/family training;Equipment eval/education; Bed mobility;Gait training;Spoke to nursing   Progress Progressing toward goals   PT Frequency 5-7x/wk   Recommendation   PT Discharge Recommendation Home with outpatient rehabilitation   Equipment Recommended [facility] Recommended Wheeled walker   Change/add to [employer] Mobile App? No   AM-PAC Basic Mobility Inpatient   Turning in Bed Without Bedrails 3   Lying on Back to Sitting on Edge of Flat Bed 3   Moving Bed to Chair 3   Standing Up From Chair (S)  3  (pt completed 3 STS with mod I and 1 STS totally independent from recliner as pt did not require armrest)   Walk in Room 3   Climb 3-5 Stairs 3   Basic Mobility Inpatient Raw Score 18   Basic Mobility Standardized Score 41 05   Highest Level Of Mobility   JH-HLM Goal 6: Walk 10 steps or more   JH-HLM Achieved 8: Walk 250 feet ot more   Education   Education Provided Mobility training; Other   Patient Demonstrates acceptance/verbal understanding   End of Consult   Patient Position at End of Consult Bedside chair;Bed/Chair alarm activated; All needs within reach   The patient's AM-PAC Basic Mobility Inpatient Short Form Raw Score is 18  A Raw score of greater than 16 suggests the patient may benefit from discharge to home  Please also refer to the recommendation of the Physical Therapist for safe discharge planning    Analilia Pinto PTA

## 2022-07-23 NOTE — CASE MANAGEMENT
Case Management Discharge Planning Note    Patient name Joyce Zee  Location S /S -29 MRN [id number]  : 1970 Date 2022       Current Admission Date: 2022  Current Admission Diagnosis:Other spondylosis with myelopathy, cervical region   Patient Active Problem List    Diagnosis Date Noted    Other spondylosis with myelopathy, cervical region 2022    Lumbar radiculopathy 2022      LOS (days): 4  Geometric Mean LOS (GMLOS) (days): 4 50  Days to GMLOS:0 6     OBJECTIVE:  Risk of Unplanned Readmission Score: 5 76         Current admission status: Inpatient   Preferred Pharmacy:   [address] [facility], [address] Joshua [place]  Phone: [phone] Fax: [facility]Geryl [facility]  [address] [address]  Phone: [phone] Fax: [phone]    Primary Care Provider: Meenakshi Pena MD    Primary Insurance: [address] [place] [address]  Secondary Insurance:     DISCHARGE DETAILS:    Patient picking up meds from [facility]  Patient requiring transportation home  CM met with patient bedside  Patient appropriate for LYFT ride  CM confirmed destination address, waiver signed by patient  CM s/w Maribel Keith at [facility] to request JENNIFER RODRIGUEZ to call EXT [phone]

## 2022-07-25 NOTE — UTILIZATION REVIEW
Notification of Discharge   This is a Notification of Discharge from our facility [address]  Please be advised that this patient has been discharge from our facility  Below you will find the admission and discharge date and time including the patients disposition  UTILIZATION REVIEW CONTACT:  Tulio Vang MA  Utilization   Network Utilization Review Department  Phone: [phone] x carefully listen to the prompts  All voicemails are confidential   Email: [email or web address]     PHYSICIAN ADVISORY SERVICES:  FOR EKUM-TM-OPVS REVIEW - MEDICAL NECESSITY DENIAL  Phone: [phone]  Fax: [phone]  Email: [email or web address]     PRESENTATION DATE: 7/19/2022  7:08 AM  OBERVATION ADMISSION DATE:   INPATIENT ADMISSION DATE: 7/19/22  3:54 PM   DISCHARGE DATE: 7/23/2022  1:30 PM  DISPOSITION: Home/Self Care Home/Self Care      IMPORTANT INFORMATION:  Send all requests for admission clinical reviews, approved or denied determinations and any other requests to dedicated fax number below belonging to the campus where the patient is receiving treatment   List of dedicated fax numbers:  [address] DENIALS (Administrative/Medical Necessity) [phone]   [address] [facility] (Maternity/NICU/Pediatrics) [phone]   [facility] [phone]   [address] [facility] [phone]   [address] [place] [address] [phone]   [address] [place] [address] [address] [place] [address],[address] [phone]   [facility]  [phone]   [address] [place] [address], [facility]  [address] [facility] 1000 [facility] [phone]

## 2022-07-26 NOTE — TELEPHONE ENCOUNTER
Called patient to see how he is doing after surgery  Patient reports he is doing well overall and denies any incisional issues or fevers  Patient is able to ambulate around the house and complete ADLs  Educated the patient about the importance of preventing blood clots and provided measures how to prevent them  Patient has moved his bowels since the surgery  Encouraged patient to take an over the counter stool softener, if he is taking narcotic pain medication  Encouraged fiber intake and fluids  Reviewed incision care with the patient  Advised that as of now he should be showering normally  He admits that the bandage placed in the hospital is still on  He has trouble lifting his arms above his shoulders and does not have a lot of assistance at home  Advised him that he should remove dressing and attempt to wash in the shower even if to just allow water to run over the area to help wash away surface bacteria  Requested that he have someone lay eyes on the incision at least once per day to monitor for the development infection  Do not submerge in water until cleared by the surgeon  Do not apply any creams, ointments, or lotions to the site  Patient is aware to call the office if any redness, swelling, drainage, dehiscence of incision, or fever >100 F occurs  Patient is aware to call the office if any concerns or questions may arise  Reminded patient of his upcoming appointments with the date/time/location  Patient was appreciative for the call

## 2022-08-01 ENCOUNTER — DOCUMENTATION (OUTPATIENT)
Dept: NEUROSURGERY | Facility: CLINIC | Age: 52
End: 2022-08-01

## 2022-08-01 DIAGNOSIS — Z98.1 S/P CERVICAL SPINAL FUSION: ICD-10-CM

## 2022-08-01 DIAGNOSIS — G89.18 POST-OP PAIN: Primary | ICD-10-CM

## 2022-08-01 DIAGNOSIS — Z98.890 POST-OPERATIVE STATE: ICD-10-CM

## 2022-08-01 RX ORDER — METHOCARBAMOL 750 MG/1
750 TABLET, FILM COATED ORAL EVERY 6 HOURS PRN
Qty: 30 TABLET | Refills: 0 | Status: SHIPPED | OUTPATIENT
Start: 2022-08-01 | End: 2022-08-30 | Stop reason: SDUPTHER

## 2022-08-01 RX ORDER — OXYCODONE HYDROCHLORIDE 5 MG/1
5 TABLET ORAL EVERY 6 HOURS PRN
Qty: 28 TABLET | Refills: 0 | Status: SHIPPED | OUTPATIENT
Start: 2022-08-01 | End: 2022-08-10 | Stop reason: SDUPTHER

## 2022-08-01 NOTE — TELEPHONE ENCOUNTER
Received a call from Lisa Bello requesting refill of his pain medication and muscle relaxer  He is approximately 2 weeks s/p POSTERIOR CERVICAL LAMINECTOMY C3-6, BILATERAL FORAMINOTOMIES C4-5, FIXATION FUSION C2-T1  He states he has been taking 1 full tab about 3 times per day  Discussed with ELVIS GUTIERRES who authorized refill of pain medication  Oxycodone 5mg tab 1 tab q6h prn pain  #28  Also attached muscle relaxer to be signed if in agreement  Advised patient that the pain medication regimen will be decreased with each refill and eventually discontinued by the 6w post op  He stated and understanding and was appreciative

## 2022-08-03 ENCOUNTER — OFFICE VISIT (OUTPATIENT)
Dept: NEUROSURGERY | Facility: CLINIC | Age: 52
End: 2022-08-03

## 2022-08-03 VITALS
SYSTOLIC BLOOD PRESSURE: 124 MMHG | DIASTOLIC BLOOD PRESSURE: 82 MMHG | TEMPERATURE: 98.4 F | HEART RATE: 84 BPM | BODY MASS INDEX: 26.55 KG/M2 | WEIGHT: 196 LBS | HEIGHT: 72 IN | RESPIRATION RATE: 18 BRPM

## 2022-08-03 DIAGNOSIS — M47.12 OTHER SPONDYLOSIS WITH MYELOPATHY, CERVICAL REGION: ICD-10-CM

## 2022-08-03 DIAGNOSIS — Z98.1 S/P CERVICAL SPINAL FUSION: Primary | ICD-10-CM

## 2022-08-03 PROCEDURE — 99024 POSTOP FOLLOW-UP VISIT: CPT | Performed by: PHYSICIAN ASSISTANT

## 2022-08-03 NOTE — PROGRESS NOTES
Neurosurgery Office Note  Jing Reyes 46 y o  male MRN: [id number]      Assessment/Plan      Patient is a 46 yrs old gentleman s/p 2 weeks Posterior C2-T1 cervical decompression and fusion  He is here today two weeks  for incision check and staples removal  Patient says he is feeling better with his gait and improved spasms in his legs and able to walk without cane or walker  His incisional  pain is better, only when he sleeps on his back moderate  Pain better during the day  He can't sleep on his side  He is wearing [place] collar  He says his R>L arm weakness still no change, difficulty abduction his arms and paresthesia on tips of his right fingers>left fingers  He says he has enough pain meds  Patient didn't start PTT yet  He called the service and they told him to go where the service is provided, but he doesn't have a ride  Exam- A&OX3, [place], right deltoid weakness 4-/5, L UE deltoid 4/5, IO 4/5  Sensation to LT residual on tips of fingers on the right hand  Positive stark's test bilaterally  +3 clonus, more pronounced in the feet  Incision Posterior cervical spine healing well, all staples in place  No sign of infection or dehiscence  No drainage or discharge  [place] collar on  Hx & PE reviewed  Mx plan discussed  Patient's pain limited mostly at nght , seems like it is mechanically exacerbated when sleeping supine wearing Cervical collar  His R>L arm weakness more or less stable  9Patient was not doing PT since he went home, didn't get a ride)  Patient;s gait and extremities spasm/tremors improved, except occasional focal spasm in the right anterior thigh  Wound healed well, all staples in place-removed including one  Suture  No post removal bleeding or oozing  Advised no shower for 24 h  Ambulatory home bound PT order placed  Advised to continue walking exercise  Continue wearing [place] collar all the time  Fall precaution  Avoid excessive flexion/extension   Lifting heavy objects >5-6 Lbs  No axial loading  All questions and concerns were answered  Patient expressed his understandings and agreed with the plan  Plan:  1  Ambulatory referral to Home bound PT/OT  2  Upright Cervical spine x-rays in [place] collar 4 weeks  3  All staples and one ROYAL site suture was removed  No post removal bleeding  4  Continue pain meds and PT  5  Wear [place] all the time and isaac collar for shower  6  Fall precaution, avoid excessive Flex/Ext your neck  7  Follow up with Dr Erendira Cao in 4 weeks with Anna Ortega put in  I spent 30 minutes with the patient today in which >50% of the time was spent counseling/coordination of care regarding diagnosis, imaging review, symptoms and treatment plan  C/C; '' 2 weeks F/U C2-T1 PCDF"      History of Present Illness     46y o  year old male with myelopathy, s/p 2 weeks C2-T1 PCDF, here today for incision check  Reports doing fine, but reports he is not doing PT, because he doesn't have a ride  He says incisional pain is better during the day, but experiences more at night when he goes to bed  He noticed improvement with his gait , spasms/tremors in the legs, only focal spasm that he occasionally expreiences in his right anterior thigh  He is able to walk without walker or cane  He denies fever, chills, rigors, cough or chest pain  Denies any drainage or discharge from the incision site  He takes pain med's mostly at night  Reports weakness in his right arm didn't change much, left slightly better, has also residual paresthesia in his right finger tips  Patient wearing [employer] collar  REVIEW OF SYSTEMS  ROS personally reviewed and udated  Review of Systems   Constitutional: Negative  HENT: Negative  Eyes: Negative  Respiratory: Negative  Cardiovascular: Negative  Gastrointestinal: Negative  Endocrine: Negative  Genitourinary: Negative  Musculoskeletal: Positive for neck pain (radiates to b/l shoulders and arms)  S/p C3-6 LAMIS, POSTERIOR FUSION C2-T1on 22- HDM  Wearing Cervical Collar   Skin: Negative  Allergic/Immunologic: Negative  Neurological: Positive for weakness (right arm) and numbness (and tingling on right arm)  Hematological: Negative  Psychiatric/Behavioral: Positive for sleep disturbance (due to pain)  All other systems reviewed and are negative  Meds/Allergies     Current Outpatient Medications   Medication Sig Dispense Refill    acetaminophen (TYLENOL) 325 mg tablet Take 3 tablets (975 mg total) by mouth every 8 (eight) hours  0    docusate sodium (COLACE) 100 mg capsule Take 1 capsule (100 mg total) by mouth 2 (two) times a day  0    finasteride (PROSCAR) 5 mg tablet Take 5 mg by mouth daily (Patient not taking: No sig reported)      gabapentin (NEURONTIN) 100 mg capsule Take 1 capsule (100 mg total) by mouth 3 (three) times a day 90 capsule 0    methocarbamol (ROBAXIN) 750 mg tablet Take 1 tablet (750 mg total) by mouth every 6 (six) hours as needed for muscle spasms 30 tablet 0    oxyCODONE (ROXICODONE) 5 immediate release tablet Take 1 tablet (5 mg total) by mouth every 6 (six) hours as needed for severe pain Max Daily Amount: 20 mg 28 tablet 0     No current facility-administered medications for this visit  No Known Allergies    PAST HISTORY    Past Medical History:   Diagnosis Date    Enlarged prostate        Past Surgical History:   Procedure Laterality Date    ABCESS DRAINAGE      KNEE ARTHROSCOPY Left     as a teenager    CT ARTHRODESIS POSTERIOR/POSTERIORLATERAL CERVICAL BELOW C2 N/A 2022    Procedure: POSTERIOR CERVICAL LAMINECTOMY C3-6, FIXATION FUSION C2-T1 (IMPULSE MONITORING);   Surgeon: Deanne Victoria MD;  Location: [facility];  Service: Neurosurgery       Social History     Tobacco Use    Smoking status: Former Smoker     Packs/day: 0 50     Quit date: 9/15/2021     Years since quittin 8    Smokeless tobacco: Never Used   Waleska Baez Tobacco comment: quit 9 months ago   Vaping Use    Vaping Use: Never used   Substance Use Topics    Alcohol use: Never    Drug use: Not Currently       Family History   Problem Relation Age of Onset    Hypertension Mother     Rheum arthritis Mother     Diabetes Father     Anxiety disorder Father          Above history personally reviewed  EXAM    Vitals: There were no vitals taken for this visit  ,There is no height or weight on file to calculate BMI  Physical Exam  Constitutional:       Appearance: Normal appearance  HENT:      Head: Normocephalic and atraumatic  Eyes:      Extraocular Movements: Extraocular movements intact  Neck:      Comments: Wearing [place] collar, incision healing well, no signs of discharge, drainage or dehiscence nor infection  Cardiovascular:      Rate and Rhythm: Normal rate  Pulses: Normal pulses  Pulmonary:      Effort: Pulmonary effort is normal    Musculoskeletal:         General: Normal range of motion  Skin:     General: Skin is warm  Neurological:      Mental Status: He is alert  GCS: GCS eye subscore is 4  GCS verbal subscore is 5  GCS motor subscore is 6  Cranial Nerves: Cranial nerves are intact  Sensory: Sensory deficit present  Motor: Weakness present  Deep Tendon Reflexes: Reflexes are normal and symmetric  Reflex Scores:       Tricep reflexes are 2+ on the right side and 2+ on the left side  Bicep reflexes are 2+ on the right side and 2+ on the left side  Brachioradialis reflexes are 2+ on the right side and 2+ on the left side  Patellar reflexes are 2+ on the right side and 2+ on the left side  Achilles reflexes are 2+ on the right side and 2+ on the left side    Psychiatric:         Speech: Speech normal          Neurologic Exam     Mental Status   Speech: speech is normal   Level of consciousness: alert    Cranial Nerves     CN III, IV, VI   Nystagmus: none     CN XI   CN XI normal  Motor Exam   Muscle bulk: normal  Overall muscle tone: normal  Right arm tone: normal  Left arm tone: normal  Right arm pronator drift: absent  Left arm pronator drift: absent  Right leg tone: normal  Left leg tone: normal    Sensory Exam   Right arm light touch: decreased from fingers  Left arm light touch: decreased from fingers  Right leg light touch: normal  Left leg light touch: normal    Gait, Coordination, and Reflexes     Reflexes   Right brachioradialis: 2+  Left brachioradialis: 2+  Right biceps: 2+  Left biceps: 2+  Right triceps: 2+  Left triceps: 2+  Right patellar: 2+  Left patellar: 2+  Right achilles: 2+  Left achilles: 2+  Right : 2+  Left : 2+  Right [place]: present  Right ankle clonus: absent  Left ankle clonus: present  Left pendular knee jerk: absent        MEDICAL DECISION MAKING    Imaging Studies:     XR chest pa & lateral    Result Date: 7/15/2022  Narrative: CHEST INDICATION:   M47 12: Other spondylosis with myelopathy, cervical region G95 9: Disease of spinal cord, unspecified G95 20: Unspecified cord compression  COMPARISON:  None EXAM PERFORMED/VIEWS:  XR CHEST PA & LATERAL Images: 4 FINDINGS: Cardiomediastinal silhouette appears unremarkable  The lungs are clear  No pneumothorax or pleural effusion  Osseous structures appear within normal limits for patient age  Impression: No acute cardiopulmonary disease  Workstation performed: [id number]     XR cervical spine 2 or 3 views    Result Date: 7/20/2022  Narrative: CERVICAL SPINE INDICATION:   post C2-C7 decompression and fusion  COMPARISON:  Correlation is made with the prior study dated 6/6/2022  VIEWS:  XR SPINE CERVICAL 2 OR 3 VW INJURY Images: 2 FINDINGS: Postsurgical changes with laminectomies noted from C3 through C6 with articular pillar screws and interconnecting rods from C2 through C7 with transpedicular screws at the level of T1    Surgical skin staples are seen posteriorly with posterior soft tissue gas likely from recent surgery  Overlying surgical drain as well posteriorly  No prevertebral soft tissue swelling  No acute fracture or spondylolisthesis seen  There is mild disc space narrowing from C4-C5 through C6-7 with degenerative osteophytes  Impression: Postsurgical changes as above  Workstation performed: [id number]     XR spine cervical 2 or 3 vw injury    Result Date: 7/19/2022  Narrative: C-ARM - CERVICAL SPINE INDICATION: Intraoperative localization for procedure guidance  Procedure guidance  COMPARISON:  MRI of the cervical spine from June 6, 2022 and x-ray of the cervical spine from June 22, 2021  TECHNIQUE: FLUOROSCOPY TIME:   32 seconds 8 FLUOROSCOPIC IMAGES FINDINGS: Fluoroscopic guidance provided for procedure guidance  Initial lateral intraoperative fluoroscopic image demonstrates a dorsally located probe at the C2 level  Subsequent images demonstrate posterior paired vertical michael and transarticular screw fixation  throughout the cervical spine  Surgical hardware is noted from the  C2-T1 level with pedicle screws at T1  There is maintained anatomic alignment  Osseous and soft tissue detail limited by technique  Impression: Fluoroscopic guidance provided for procedure guidance  Please refer to the separate procedure notes for additional details    Workstation performed: [id number]       I have personally reviewed pertinent reports   , I have personally reviewed pertinent films in PACS and I have personally reviewed pertinent films in PACS with a Radiologist  within normal limits

## 2022-08-04 ENCOUNTER — HOME HEALTH ADMISSION (OUTPATIENT)
Dept: HOME HEALTH SERVICES | Facility: HOME HEALTHCARE | Age: 52
End: 2022-08-04
Payer: COMMERCIAL

## 2022-08-04 ENCOUNTER — HOME CARE VISIT (OUTPATIENT)
Dept: HOME HEALTH SERVICES | Facility: HOME HEALTHCARE | Age: 52
End: 2022-08-04

## 2022-08-06 ENCOUNTER — HOME CARE VISIT (OUTPATIENT)
Dept: HOME HEALTH SERVICES | Facility: HOME HEALTHCARE | Age: 52
End: 2022-08-06
Payer: COMMERCIAL

## 2022-08-06 PROCEDURE — G0151 HHCP-SERV OF PT,EA 15 MIN: HCPCS

## 2022-08-06 PROCEDURE — 400013 VN SOC

## 2022-08-07 VITALS — HEART RATE: 86 BPM | SYSTOLIC BLOOD PRESSURE: 122 MMHG | OXYGEN SATURATION: 97 % | DIASTOLIC BLOOD PRESSURE: 74 MMHG

## 2022-08-09 ENCOUNTER — HOME CARE VISIT (OUTPATIENT)
Dept: HOME HEALTH SERVICES | Facility: HOME HEALTHCARE | Age: 52
End: 2022-08-09
Payer: COMMERCIAL

## 2022-08-09 PROCEDURE — G0152 HHCP-SERV OF OT,EA 15 MIN: HCPCS

## 2022-08-10 ENCOUNTER — HOME CARE VISIT (OUTPATIENT)
Dept: HOME HEALTH SERVICES | Facility: HOME HEALTHCARE | Age: 52
End: 2022-08-10
Payer: COMMERCIAL

## 2022-08-10 ENCOUNTER — DOCUMENTATION (OUTPATIENT)
Dept: NEUROSURGERY | Facility: CLINIC | Age: 52
End: 2022-08-10

## 2022-08-10 VITALS — SYSTOLIC BLOOD PRESSURE: 110 MMHG | OXYGEN SATURATION: 97 % | DIASTOLIC BLOOD PRESSURE: 78 MMHG | HEART RATE: 76 BPM

## 2022-08-10 VITALS — HEART RATE: 83 BPM | DIASTOLIC BLOOD PRESSURE: 70 MMHG | SYSTOLIC BLOOD PRESSURE: 105 MMHG

## 2022-08-10 DIAGNOSIS — Z98.890 POST-OPERATIVE STATE: ICD-10-CM

## 2022-08-10 DIAGNOSIS — Z98.1 S/P CERVICAL SPINAL FUSION: ICD-10-CM

## 2022-08-10 DIAGNOSIS — G89.18 POST-OP PAIN: ICD-10-CM

## 2022-08-10 PROCEDURE — G0151 HHCP-SERV OF PT,EA 15 MIN: HCPCS

## 2022-08-10 RX ORDER — GABAPENTIN 100 MG/1
100 CAPSULE ORAL 3 TIMES DAILY
Qty: 90 CAPSULE | Refills: 0 | Status: SHIPPED | OUTPATIENT
Start: 2022-08-10 | End: 2022-10-21

## 2022-08-10 RX ORDER — OXYCODONE HYDROCHLORIDE 5 MG/1
5 TABLET ORAL EVERY 6 HOURS PRN
Qty: 28 TABLET | Refills: 0 | Status: SHIPPED | OUTPATIENT
Start: 2022-08-10 | End: 2022-08-23 | Stop reason: SDUPTHER

## 2022-08-10 NOTE — TELEPHONE ENCOUNTER
Patient called the nurse line requesting a refill of his pain medication and his Gabapentin  He is about 3 weeks s/p: POSTERIOR CERVICAL LAMINECTOMY C3-6, BILATERAL FORAMINOTOMIES C4-5, FIXATION FUSION C2-T1 by [facility]  He is taking oxycodone 5mg Q6prn and Gabapentin 100mg TID  He reports having pain in his posterior neck, across his shoulders and down into his arms  Has incisional pain as well as nerve pain  Okay to refill at this time?

## 2022-08-11 PROCEDURE — G0180 MD CERTIFICATION HHA PATIENT: HCPCS | Performed by: PHYSICIAN ASSISTANT

## 2022-08-11 NOTE — CASE COMMUNICATION
2w4 for Occupational Therapy with focus on ue strengthening and coordination to shave and dress self and for indpendence in meal prep

## 2022-08-11 NOTE — TELEPHONE ENCOUNTER
[employer] calling regarding pts medication, states that the patients medication oxycodone was sent into the pharmacy however requires a prior authorization to be completed prior to dispensing, states the patient is in a lot of pain and would like this to be completed ASAP       Reference #:  [id number]   Provider Service Line:  [phone]

## 2022-08-12 ENCOUNTER — HOME CARE VISIT (OUTPATIENT)
Dept: HOME HEALTH SERVICES | Facility: HOME HEALTHCARE | Age: 52
End: 2022-08-12
Payer: COMMERCIAL

## 2022-08-12 VITALS — SYSTOLIC BLOOD PRESSURE: 98 MMHG | DIASTOLIC BLOOD PRESSURE: 66 MMHG | HEART RATE: 90 BPM

## 2022-08-12 PROCEDURE — G0152 HHCP-SERV OF OT,EA 15 MIN: HCPCS

## 2022-08-12 NOTE — TELEPHONE ENCOUNTER
Attempted to complete prior auth for oxycodone via covermymeds but it stated that I needed to contact the patient's plan  Calling provider services now  Received [employer] form to complete  Completed form and faxed to the number provided on the form  Awaiting response

## 2022-08-12 NOTE — TELEPHONE ENCOUNTER
Received a call from patient on the nurseline regarding prior auth needed for his oxycodone  Returned call to patient and made him aware our office is working on the prior [facility] however it can take some time  Informed patient he is able to pay out of pocket if he does not want to wait  Patient became agitated asking why this time his medication requires authorization when he was told yesterday he just needs to  the medication  Attempted to explain to him our provider signed off on the medication however it is his insurance company that is requiring this  Patient stated he will wait and ended the call

## 2022-08-15 ENCOUNTER — TELEPHONE (OUTPATIENT)
Dept: NEUROSURGERY | Facility: CLINIC | Age: 52
End: 2022-08-15

## 2022-08-15 ENCOUNTER — HOME CARE VISIT (OUTPATIENT)
Dept: HOME HEALTH SERVICES | Facility: HOME HEALTHCARE | Age: 52
End: 2022-08-15
Payer: COMMERCIAL

## 2022-08-15 VITALS — HEART RATE: 63 BPM | OXYGEN SATURATION: 100 %

## 2022-08-15 PROCEDURE — G0152 HHCP-SERV OF OT,EA 15 MIN: HCPCS

## 2022-08-15 PROCEDURE — G0151 HHCP-SERV OF PT,EA 15 MIN: HCPCS

## 2022-08-15 NOTE — CASE COMMUNICATION
Pt requesting to go to out patient for therapy  Can you please put and order in his chart, Please reply to this communication    Thank you !!

## 2022-08-15 NOTE — TELEPHONE ENCOUNTER
Received a call from patient stating he wants to go to outpatient PT  Attempted to return call to patient however sent right to  to confirm patient needs a new referral  Left  requesting a call back to confirm, provided office number

## 2022-08-16 VITALS — HEART RATE: 64 BPM | DIASTOLIC BLOOD PRESSURE: 78 MMHG | SYSTOLIC BLOOD PRESSURE: 128 MMHG | OXYGEN SATURATION: 99 %

## 2022-08-16 DIAGNOSIS — Z98.1 S/P CERVICAL SPINAL FUSION: Primary | ICD-10-CM

## 2022-08-16 NOTE — TELEPHONE ENCOUNTER
Received fax from insurance company informing me that patient's oxycodone PA was approved  Will notify patient

## 2022-08-18 ENCOUNTER — HOME CARE VISIT (OUTPATIENT)
Dept: HOME HEALTH SERVICES | Facility: HOME HEALTHCARE | Age: 52
End: 2022-08-18
Payer: COMMERCIAL

## 2022-08-18 VITALS — DIASTOLIC BLOOD PRESSURE: 62 MMHG | SYSTOLIC BLOOD PRESSURE: 100 MMHG | OXYGEN SATURATION: 99 % | HEART RATE: 72 BPM

## 2022-08-18 VITALS — OXYGEN SATURATION: 99 % | HEART RATE: 67 BPM

## 2022-08-18 PROCEDURE — G0151 HHCP-SERV OF PT,EA 15 MIN: HCPCS

## 2022-08-18 PROCEDURE — G0152 HHCP-SERV OF OT,EA 15 MIN: HCPCS

## 2022-08-23 ENCOUNTER — DOCUMENTATION (OUTPATIENT)
Dept: NEUROSURGERY | Facility: CLINIC | Age: 52
End: 2022-08-23

## 2022-08-23 DIAGNOSIS — Z98.1 S/P CERVICAL SPINAL FUSION: ICD-10-CM

## 2022-08-23 DIAGNOSIS — G89.18 POST-OP PAIN: ICD-10-CM

## 2022-08-23 DIAGNOSIS — Z98.890 POST-OPERATIVE STATE: ICD-10-CM

## 2022-08-23 RX ORDER — OXYCODONE HYDROCHLORIDE 5 MG/1
5 TABLET ORAL EVERY 6 HOURS PRN
Qty: 28 TABLET | Refills: 0 | Status: SHIPPED | OUTPATIENT
Start: 2022-08-23 | End: 2022-09-07 | Stop reason: SDUPTHER

## 2022-08-23 NOTE — TELEPHONE ENCOUNTER
Patient called the nurse line requesting a refill of his oxycodone  Patient is now 1 month s/p: POSTERIOR CERVICAL LAMINECTOMY C3-6, BILATERAL FORAMINOTOMIES C4-5, FIXATION FUSION C2-T1 by HDM  He is taking Oxycodone 5mg Q8 prn at this time  I have checked his PDMP and it was appropriate  I have attached it to his chart in a separate note  Okay to refill?

## 2022-08-25 ENCOUNTER — EVALUATION (OUTPATIENT)
Dept: PHYSICAL THERAPY | Facility: CLINIC | Age: 52
End: 2022-08-25
Payer: COMMERCIAL

## 2022-08-25 DIAGNOSIS — Z98.1 S/P CERVICAL SPINAL FUSION: Primary | ICD-10-CM

## 2022-08-25 DIAGNOSIS — Z98.1 S/P CERVICAL SPINAL FUSION: ICD-10-CM

## 2022-08-25 PROCEDURE — 97110 THERAPEUTIC EXERCISES: CPT

## 2022-08-25 PROCEDURE — 97162 PT EVAL MOD COMPLEX 30 MIN: CPT

## 2022-08-25 NOTE — PROGRESS NOTES
PT Evaluation     Today's date: 2022  Patient name: Emily Hatfield  : 1970  MRN: [id number]  Referring provider: Julia Camp PA-C  Dx:   Encounter Diagnosis     ICD-10-CM    1  S/P cervical spinal fusion  Z98 1 Ambulatory Referral to Physical Therapy                  Assessment  Assessment details: Pt is a 46 y o  male who presents to OP PT for IE s/p C3-C6 laminectomies w/ fixation fusion from C2-T1 on 22  Pt c/o pain in neck and R arm and RUE radicular symptoms  Functionally pt is having greatest difficulty w/ ambulation (does not use AD), balance, and stairs (has 10 ALO/access apartment), as well as ADLs/IADLs of UE and fine motor tasks  Upon formal assessment pt demonstrates, full shoulder PROM but limited AROM and strength  BLE weakness and mild coordination deficits also noted  5xSTS of 55sec indicates decreased functional strength and endurance  Given these findings pt is recommended for skilled PT intervention 2x wk  Pt may also benefit from OT evaluation for  strength and fine motor skills  Pt agreeable to POC  Pt advised to continue w/ HEP given by home health therapy at this time  Impairments: abnormal coordination, abnormal gait, abnormal or restricted ROM, impaired balance, impaired physical strength, lacks appropriate home exercise program, pain with function, safety issue, poor posture  and poor body mechanics  Understanding of Dx/Px/POC: good   Prognosis: good    Goals  LTGs (to be achieved within 6-8 weeks)   1  Pt will be I with HEP at d/c to promote PT carry-over  2  Pt will meet FOTO predicted score  3  Pt will improve BUE/LE strength to 4+/5 or greater to increase ease w/ functional mobility  4  Pt will improve 5xSTS time by 15 sec or greater to indicate a significant improvement in functional strength and balance  5  Pt will improve BUE (shoulder and elbow) ROM to WNL to increase ease w/ functional mobility and ADLs/IADLs           Plan  Patient would benefit from: skilled physical therapy and OT eval  Planned modality interventions: ultrasound, unattended electrical stimulation, thermotherapy: hydrocollator packs and cryotherapy  Planned therapy interventions: abdominal trunk stabilization, aquatic therapy, balance, IADL retraining, joint mobilization, manual therapy, massage, Mcclellan taping, motor coordination training, muscle pump exercises, neuromuscular re-education, orthotic fitting/training, body mechanics training, patient education, postural training, strengthening, stretching, therapeutic activities, therapeutic exercise, therapeutic training, transfer training, home exercise program, graded motor, graded exercise, graded activity, gait training, functional ROM exercises and flexibility  Frequency: 1-2x  Duration in weeks: 12  Treatment plan discussed with: patient        Subjective Evaluation    History of Present Illness  Mechanism of injury: Pt underwent C3-C6 laminectomies w/ fixation fusion from C2-T1 on 7/20/22  Hospital stay largely uncomplicated and pt d/c'ed 7/23/22  Did receive PT during IP stay following by home-health OT for approx  2 weeks starting in mid-August 2/2 lack of transportation  Currently pt pain in neck and R arm, he is no longer getting muscle spasms  He is still experiencing some radicular symptoms into RUE including pain and tingling  Functionally pt is having greatest difficulty w/ ambulation (does not use AD), balance, and stairs (has 10 ALO/access apartment)  Pt is R handed, he is having difficulty w/ lifting B arms (R worse than L)  He also noted difficulties w/ fine motor tasks w/ B hands  He is having difficulty sleeping from pain and discomfort in c-collar  Per physician orders: Continue wearing [place] collar all the time  Fall precaution  Avoid excessive flexion/extension  Lifting heavy objects >5-6 Lbs  No axial loading            Objective     Active Range of Motion   Left Shoulder   Flexion: 25 degrees Abduction: 24 degrees     Right Shoulder   Flexion: 10 degrees   Abduction: 7 degrees     Left Elbow   Flexion: WFL    Right Elbow   Flexion: 5 degrees     Passive Range of Motion   Left Shoulder   Normal passive range of motion    Right Shoulder   Normal passive range of motion    Left Elbow   Normal passive range of motion    Right Elbow   Normal passive range of motion    Strength/Myotome Testing     Left Shoulder     Planes of Motion   Flexion: 3-   Abduction: 3-     Right Shoulder     Planes of Motion   Flexion: 3-   Abduction: 3-     Left Elbow   Flexion: 3  Extension: 3    Right Elbow   Flexion: 2  Extension: 2    Left Hip   Planes of Motion   Flexion: 4-  Extension: 4-  Abduction: 4-  External rotation: 4-    Right Hip   Planes of Motion   Flexion: 4-  Extension: 4-  Abduction: 4-  External rotation: 4-    Left Knee   Flexion: 4  Extension: 4    Right Knee   Flexion: 4  Extension: 4    Left Ankle/Foot   Dorsiflexion: 4  Plantar flexion: 4    Right Ankle/Foot   Dorsiflexion: 4  Plantar flexion: 4    Ambulation     Observational Gait   Decreased walking speed, stride length, left step length and right step length  Left foot contact pattern: foot flat  Right foot contact pattern: foot flat  Left arm swing: high guard  Right arm swing: high guard  Base of support: increased    Additional Observational Gait Details  Increased trunk rotation    Quality of Movement During Gait     Knee    Knee (Left): Positive extensor thrust    Knee (Right): Positive stiff knee  Neuro Exam:     Coordination   Heel to shin: left WNL and right WNL  Finger to nose: left WNL and right WNL  Rapid alternating movements: UE impaired and LE impaired    Functional outcomes   5x sit to stand: 55sec  (seconds)  Functional outcome comment: 5xSTS: must use BUE, increase L knee pain/"locking"                 Precautions: Continue wearing [place] vista collar all the time  Fall precaution  Avoid excessive flexion/extension   Lifting heavy objects >5-6 Lbs  No axial loading      * indicates exercises included in HEP      Manuals 8/25                                                   Pt education 10', POC, HEP            Neuro Re-Ed             Toe taps to cone vs step             Tandem stance             Side stepping             TKE             Static balance on foam             Core stabilization                           Ther Ex             Bike             UBE             Pulleys             TB rows & ext             Bicep curls              Tricep "chair push-up"             Standing hip ext & abd             HR/TR             LAQ             TG             Leg press                          Ther Activity             Obstacle course              Weaving around cones             Step-ups             Mini-squats                          Gait Training                                       Modalities

## 2022-08-30 DIAGNOSIS — Z98.890 POST-OPERATIVE STATE: ICD-10-CM

## 2022-08-30 DIAGNOSIS — G89.18 POST-OP PAIN: ICD-10-CM

## 2022-08-30 DIAGNOSIS — Z98.1 S/P CERVICAL SPINAL FUSION: ICD-10-CM

## 2022-08-30 RX ORDER — METHOCARBAMOL 750 MG/1
750 TABLET, FILM COATED ORAL 2 TIMES DAILY PRN
Qty: 30 TABLET | Refills: 0 | Status: SHIPPED | OUTPATIENT
Start: 2022-08-30 | End: 2022-09-14 | Stop reason: SDUPTHER

## 2022-08-30 NOTE — TELEPHONE ENCOUNTER
Received a call from patient looking for a refill of his robaxin  Returned call to patient who stated the medication has been helping with his spasms  Patient stated he takes 1 tablet in the morning and before bed  Patient is s/p POSTERIOR CERVICAL LAMINECTOMY C3-6, FIXATION FUSION C2-T1 (IMPULSE MONITORING) by Dr Moon Garcia on 7/19  Informed patient this RN will route to a provider to confirm refill       Patient stated an understanding and was appreciative of the call back

## 2022-08-31 ENCOUNTER — APPOINTMENT (OUTPATIENT)
Dept: PHYSICAL THERAPY | Facility: CLINIC | Age: 52
End: 2022-08-31
Payer: COMMERCIAL

## 2022-09-01 ENCOUNTER — OFFICE VISIT (OUTPATIENT)
Dept: PHYSICAL THERAPY | Facility: CLINIC | Age: 52
End: 2022-09-01
Payer: COMMERCIAL

## 2022-09-01 DIAGNOSIS — Z98.1 S/P CERVICAL SPINAL FUSION: Primary | ICD-10-CM

## 2022-09-01 PROCEDURE — 97112 NEUROMUSCULAR REEDUCATION: CPT

## 2022-09-01 PROCEDURE — 97110 THERAPEUTIC EXERCISES: CPT

## 2022-09-01 NOTE — PROGRESS NOTES
Daily Note     Today's date: 2022  Patient name: Jam Felton  : 1970  MRN: [id number]  Referring provider: Rohith Klein PA-C  Dx:   Encounter Diagnosis     ICD-10-CM    1  S/P cervical spinal fusion  Z98 1                   Subjective: Patient noted that he did not sleep well last night due to having leg cramps and also noted that he gets about an hour of sleep then needs to get up and move around due to being in [place] collar  Patient noted some soreness in his neck post treatment but noted that he has some neck soreness and stiffness throughout the day depending what patient is doing or walking distance in a day  Objective: See treatment diary below      Assessment: Tolerated treatment fair  Patient was able to perform listed exercises  Patient demonstrated fatigue post treatment  Rest breaks taken as needed in between exercises  Patient noted fatigue and some burning in Bilateral  LE's post treatment  Patient arrived and kept [place] vista collar on entire session  Patient would benefit from continued PT      Plan: Continue per plan of care  Precautions: Continue wearing [place] collar all the time  Fall precaution  Avoid excessive flexion/extension  Lifting heavy objects >5-6 Lbs  No axial loading      * indicates exercises included in HEP      Manuals                                                   Pt education 10', POC, HEP            Neuro Re-Ed             Toe taps to cone vs step             Tandem stance  20" x 2 ea           Side stepping  X 4 laps            TKE             Static balance on foam  FA 30"x2           Core stabilization                           Ther Ex             Bike  10 min           UBE             Pulleys             TB rows & ext             Bicep curls   10 x            Tricep "chair push-up"             Standing hip ext & abd  X 10 ea abd           HR/TR  20 xea            LAQ  3" x 15           TG             Leg press Ther Activity             Obstacle course              Weaving around cones             Step-ups             Mini-squats                          Gait Training                                       Modalities

## 2022-09-06 DIAGNOSIS — Z98.1 S/P CERVICAL SPINAL FUSION: ICD-10-CM

## 2022-09-06 DIAGNOSIS — Z98.890 POST-OPERATIVE STATE: ICD-10-CM

## 2022-09-06 DIAGNOSIS — G89.18 POST-OP PAIN: ICD-10-CM

## 2022-09-06 NOTE — TELEPHONE ENCOUNTER
Received a call from patient looking for a refill of his oxycodone  Patient stated he is doing well over all and has continued to extend the frequency of the medication  Patient reports currently taking 1 tablet (5 mg) Q8  Advised this RN will route to a provider to see if they feel comfortable refilling the medication  Patient is approaching his 6 week visit      Patient is s/p POSTERIOR CERVICAL LAMINECTOMY C3-6, FIXATION FUSION C2-T1 by [facility] on 7/19

## 2022-09-07 ENCOUNTER — HOSPITAL ENCOUNTER (OUTPATIENT)
Dept: RADIOLOGY | Facility: HOSPITAL | Age: 52
Discharge: HOME | End: 2022-09-07
Payer: COMMERCIAL

## 2022-09-07 DIAGNOSIS — Z98.1 S/P CERVICAL SPINAL FUSION: ICD-10-CM

## 2022-09-07 PROCEDURE — 72040 X-RAY EXAM NECK SPINE 2-3 VW: CPT

## 2022-09-07 RX ORDER — OXYCODONE HYDROCHLORIDE 5 MG/1
5 TABLET ORAL EVERY 6 HOURS PRN
Qty: 12 TABLET | Refills: 0 | Status: SHIPPED | OUTPATIENT
Start: 2022-09-07 | End: 2022-09-09

## 2022-09-07 NOTE — TELEPHONE ENCOUNTER
Reached out to patient and left a VM advising of the refill and notifying him to discuss further refills with Dr Dolores Kanner at his appt on 9/9  Provided office number should he have questions or concerns

## 2022-09-08 ENCOUNTER — OFFICE VISIT (OUTPATIENT)
Dept: PHYSICAL THERAPY | Facility: CLINIC | Age: 52
End: 2022-09-08
Payer: COMMERCIAL

## 2022-09-08 DIAGNOSIS — Z98.1 S/P CERVICAL SPINAL FUSION: Primary | ICD-10-CM

## 2022-09-08 PROCEDURE — 97112 NEUROMUSCULAR REEDUCATION: CPT

## 2022-09-08 PROCEDURE — 97110 THERAPEUTIC EXERCISES: CPT

## 2022-09-08 NOTE — PROGRESS NOTES
Daily Note     Today's date: 2022  Patient name: Sandy Steven  : 1970  MRN: [id number]  Referring provider: Josefa Herrera PA-C  Dx:   Encounter Diagnosis     ICD-10-CM    1  S/P cervical spinal fusion  Z98 1                   Subjective: Pt c/o increased stiffness at start of session, attributes this to the damp weather  Reports compliance w/ HEP  Objective: See treatment diary below      Assessment: Tolerated treatment well  Session focused primarily on stretching and mobility as pt c/o increased stiffness today  Enjoyed pulley exercise  Pt reports increased fatigue and burning between shoulder blades "in a good way"  Patient demonstrated fatigue post treatment and would benefit from continued PT      Plan: Continue per plan of care  Precautions: Continue wearing [place] vista collar all the time  Fall precaution  Avoid excessive flexion/extension  Lifting heavy objects >5-6 Lbs  No axial loading      * indicates exercises included in HEP      Manuals           PROM shoulders                                       Pt education 10', POC, HEP            Neuro Re-Ed             Toe taps to cone vs step             Tandem stance  20" x 2 ea 2x30"          Side stepping  X 4 laps            TKE             Static balance on foam  FA 30"x2           Core stabilization                           Ther Ex             Bike  10 min 10min          UBE             Pulleys   3min flex; 3min abd          Scap set/blade squeeze   5"x20          TB rows & ext             Bicep curls   10 x            Tricep "chair push-up"             Standing hip ext & abd  X 10 ea abd x10 ea B          HR/TR  20 xea  x20 ea          LAQ  3" x 15           TG             Leg press             Physioball rollout   5"x10                       Ther Activity             Obstacle course              Weaving around cones             Step-ups             Mini-squats                          Gait Training Modalities

## 2022-09-09 ENCOUNTER — OFFICE VISIT (OUTPATIENT)
Dept: NEUROSURGERY | Facility: CLINIC | Age: 52
End: 2022-09-09

## 2022-09-09 VITALS
HEIGHT: 72 IN | WEIGHT: 174.4 LBS | DIASTOLIC BLOOD PRESSURE: 75 MMHG | BODY MASS INDEX: 23.62 KG/M2 | SYSTOLIC BLOOD PRESSURE: 111 MMHG | HEART RATE: 79 BPM | TEMPERATURE: 100.1 F | RESPIRATION RATE: 16 BRPM

## 2022-09-09 DIAGNOSIS — M48.02 DEGENERATIVE CERVICAL SPINAL STENOSIS: ICD-10-CM

## 2022-09-09 DIAGNOSIS — Z98.1 S/P CERVICAL SPINAL FUSION: Primary | ICD-10-CM

## 2022-09-09 DIAGNOSIS — G95.9 MYELOPATHY (HCC): ICD-10-CM

## 2022-09-09 DIAGNOSIS — G89.18 POST-OP PAIN: ICD-10-CM

## 2022-09-09 DIAGNOSIS — M47.12 OTHER SPONDYLOSIS WITH MYELOPATHY, CERVICAL REGION: ICD-10-CM

## 2022-09-09 DIAGNOSIS — Z98.890 POST-OPERATIVE STATE: ICD-10-CM

## 2022-09-09 PROCEDURE — 99024 POSTOP FOLLOW-UP VISIT: CPT | Performed by: NEUROLOGICAL SURGERY

## 2022-09-09 RX ORDER — OXYCODONE HYDROCHLORIDE 5 MG/1
5 TABLET ORAL 2 TIMES DAILY PRN
Qty: 24 TABLET | Refills: 0 | Status: SHIPPED | OUTPATIENT
Start: 2022-09-09

## 2022-09-09 RX ORDER — ACETAMINOPHEN 500 MG
1000 TABLET ORAL AS NEEDED
COMMUNITY

## 2022-09-09 NOTE — PROGRESS NOTES
Neurosurgery Office Note  Collins Haynes 46 y o  male MRN: [id number]      Assessment/Plan      Diagnoses and all orders for this visit:    S/P cervical spinal fusion  -     Ambulatory Referral to Physiatry; Future  -     oxyCODONE (ROXICODONE) 5 immediate release tablet; Take 1 tablet (5 mg total) by mouth 2 (two) times a day as needed for severe pain Max Daily Amount: 10 mg    Other spondylosis with myelopathy, cervical region  -     Ambulatory Referral to Physiatry; Future    Myelopathy ([address] )  -     Ambulatory Referral to Physiatry; Future    Degenerative cervical spinal stenosis  -     Ambulatory Referral to Physiatry; Future    Post-op pain  -     oxyCODONE (ROXICODONE) 5 immediate release tablet; Take 1 tablet (5 mg total) by mouth 2 (two) times a day as needed for severe pain Max Daily Amount: 10 mg    Post-operative state  -     oxyCODONE (ROXICODONE) 5 immediate release tablet; Take 1 tablet (5 mg total) by mouth 2 (two) times a day as needed for severe pain Max Daily Amount: 10 mg    Other orders  -     acetaminophen (TYLENOL) 500 mg tablet; Take 1,000 mg by mouth if needed for mild pain        Discussion:    Pain Score:   5 (neck and bilateral shoulders)    Six weeks status post PCDF C2-T1, with C4-5 foraminotomies  Phoebe Cabrera He did start to develop some right deltoid weakness around postop day 2  This persists presently, strength is about 2/5, left 3/5  He is participating in physical therapy  He still struggles with gait, hand dexterity etcetera  His mJOA is mildly better than preop  VAS and EQ5D5L also improved  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  Posterior cervical incision well healed  Postop x-rays show good alignment, stability of hardware  I have instructed the patient to wean from their cervical collar    They should do this by not wearing the collar 1-2 hours per day for the next week, then 2-4 hours per day the week following, then 4-8 hours per day the subsequent week, such that within 3-4 weeks and they will of wean out of the collar entirely  These periods without the collar should be during more sedentary periods, e g  Reading, watching TV  They should continue to wear the collar when doing strenuous activity (such as therapy etc ) until the weaning period is complete  They do not need to wear the collar while sleeping, but can during this weaning period if by doing so they feel more comfortable  He is not capable/rate return to work  I am referring him to Dr Gunnar Crawford for FCE and work status  From a pain standpoint, he is trying to wean down oxycodone  I have provided him a refill, and encouraged him to transition from oxycodone to nonsteroidal anti-inflammatories  He is to presently taking oxycodone perhaps twice a day as needed  I have suggested he weaned down to 1 over the next week or 2, and then off entirely, and relying more muscle relaxers and nonsteroidal anti-inflammatories  We will plan to see him back in 6 weeks to assess his progress etcetera  We will need to arrange for postop 6 month x-rays at that point  06/07/22 Metrics: EQ5D5L 99108=5 266; VAS 50; MJOA 9/17 09/09/22 Metrics: EQ5D5L 56845=6 562; VAS 70; MJOA 10/17          CHIEF COMPLAINT    Chief Complaint   Patient presents with    Post-op     6 WK POV, C3-6 LAMIS, POSTERIOR FUSION C2-T1       HISTORY    History of Present Illness     46y o  year old male     HPI    See Discussion    REVIEW OF SYSTEMS    Review of Systems   Constitutional: Positive for fatigue  HENT: Negative  Eyes: Negative  Respiratory: Negative  Cardiovascular: Negative  Gastrointestinal: Negative  Endocrine: Negative  Genitourinary: Negative      Musculoskeletal: Positive for gait problem (improving since surgery still wobbling while walking, still off balance, close to falling), myalgias (bilateral legs muscle cramps within the last 2 weeks) and neck pain (improving since surgery neck pain radiates to b/l shoulders and arms)  States at today's visit - More soreness in neck and bilateral shoulders then pain    Restless leg syndrome    S/p C3-6 LAMIS, POSTERIOR FUSION C2-T1on 7/19/22- HDM  Wearing Cervical Collar   Skin: Negative  Allergic/Immunologic: Negative  Neurological: Positive for weakness (bilateral arms/hands, more right then left) and numbness (and tingling on bilateral arms/hands/tips of fingers)  Negative for dizziness, syncope and headaches  Hematological: Negative  Psychiatric/Behavioral: Positive for sleep disturbance (due to pain and neck collar)  Meds/Allergies     Current Outpatient Medications   Medication Sig Dispense Refill    acetaminophen (TYLENOL) 500 mg tablet Take 1,000 mg by mouth if needed for mild pain      docusate sodium (COLACE) 100 mg capsule Take 1 capsule (100 mg total) by mouth 2 (two) times a day (Patient taking differently: Take 100 mg by mouth if needed)  0    gabapentin (NEURONTIN) 100 mg capsule Take 1 capsule (100 mg total) by mouth 3 (three) times a day 90 capsule 0    methocarbamol (ROBAXIN) 750 mg tablet Take 1 tablet (750 mg total) by mouth 2 (two) times a day as needed for muscle spasms 30 tablet 0    oxyCODONE (ROXICODONE) 5 immediate release tablet Take 1 tablet (5 mg total) by mouth 2 (two) times a day as needed for severe pain Max Daily Amount: 10 mg 24 tablet 0    acetaminophen (TYLENOL) 325 mg tablet Take 3 tablets (975 mg total) by mouth every 8 (eight) hours (Patient not taking: Reported on 9/9/2022)  0    finasteride (PROSCAR) 5 mg tablet Take 5 mg by mouth daily pt reports he is not taking this medication  (Patient not taking: Reported on 9/9/2022)       No current facility-administered medications for this visit         Allergies   Allergen Reactions    Dilaudid [Hydromorphone] Anxiety, Other (See Comments) and Hypertension     Increase in temperature         PAST HISTORY    Past Medical History:   Diagnosis Date    Enlarged prostate        Past Surgical History:   Procedure Laterality Date    ABCESS DRAINAGE      KNEE ARTHROSCOPY Left     as a teenager    [place] ARTHRODESIS POSTERIOR/POSTERIORLATERAL CERVICAL BELOW C2 N/A 2022    Procedure: POSTERIOR CERVICAL LAMINECTOMY C3-6, FIXATION FUSION C2-T1 (IMPULSE MONITORING); Surgeon: Elisa Hassan MD;  Location: [facility];  Service: Neurosurgery       Social History     Tobacco Use    Smoking status: Former Smoker     Packs/day: 0 50     Quit date: 9/15/2021     Years since quittin 9    Smokeless tobacco: Never Used    Tobacco comment: quit 9 months ago   Vaping Use    Vaping Use: Never used   Substance Use Topics    Alcohol use: Never    Drug use: Not Currently       Family History   Problem Relation Age of Onset    Hypertension Mother     Rheum arthritis Mother     Diabetes Father     Anxiety disorder Father          The following portions of the patient's history were reviewed in this encounter and updated as appropriate: Past medical, surgical, family, and social history, as well as medications, allergies, and review of systems  EXAM    Vitals:Blood pressure 111/75, pulse 79, temperature 100 1 °F (37 8 °C), resp  rate 16, height 6' (1 829 m), weight 79 1 kg (174 lb 6 4 oz)  ,Body mass index is 23 65 kg/m²  Physical Exam    Neurologic Exam      MEDICAL DECISION MAKING    Imaging Studies:     C-spine x-ray 22    I have personally reviewed pertinent reports  and I have personally reviewed pertinent films in PACS      PLEASE NOTE:  This encounter may have been completed utilizing the [employer] Direct Speech Voice Recognition Software  Grammatical errors, random word insertions, pronoun errors and incomplete sentences are occasional consequences of the system due to software limitations, ambient noise and hardware issues  These may be missed by proof reading prior to affixing electronic signature   Any questions or concerns about the content, text or information contained within the body of this dictation should be directly addressed to the advanced practitioner or physician for clarification

## 2022-09-13 ENCOUNTER — APPOINTMENT (OUTPATIENT)
Dept: PHYSICAL THERAPY | Facility: CLINIC | Age: 52
End: 2022-09-13
Payer: COMMERCIAL

## 2022-09-14 DIAGNOSIS — Z98.1 S/P CERVICAL SPINAL FUSION: ICD-10-CM

## 2022-09-14 DIAGNOSIS — G89.18 POST-OP PAIN: ICD-10-CM

## 2022-09-14 DIAGNOSIS — Z98.890 POST-OPERATIVE STATE: ICD-10-CM

## 2022-09-14 RX ORDER — METHOCARBAMOL 750 MG/1
750 TABLET, FILM COATED ORAL 2 TIMES DAILY PRN
Qty: 60 TABLET | Refills: 0 | Status: SHIPPED | OUTPATIENT
Start: 2022-09-14

## 2022-09-14 NOTE — TELEPHONE ENCOUNTER
Called patient and made him aware that refill was sent to pharmacy  He was appreciative for one more refill

## 2022-09-14 NOTE — TELEPHONE ENCOUNTER
Patient called the nurse line requesting a refill of his robaxin  Patient currently taking 750mg BID prn  He was seen for his 6 week POV (9/9/22) s/p: PCDF C2-T1, with C4-5 foraminotomies  Nirali Garcia He is being [facility] one more time for this patient?

## 2022-09-15 ENCOUNTER — OFFICE VISIT (OUTPATIENT)
Dept: PHYSICAL THERAPY | Facility: CLINIC | Age: 52
End: 2022-09-15
Payer: COMMERCIAL

## 2022-09-15 DIAGNOSIS — Z98.1 S/P CERVICAL SPINAL FUSION: Primary | ICD-10-CM

## 2022-09-15 PROCEDURE — 97112 NEUROMUSCULAR REEDUCATION: CPT

## 2022-09-15 PROCEDURE — 97110 THERAPEUTIC EXERCISES: CPT

## 2022-09-15 NOTE — PROGRESS NOTES
Daily Note     Today's date: 9/15/2022  Patient name: Jef Sorto  : 1970  MRN: [id number]  Referring provider: Patricia Snider PA-C  Dx:   Encounter Diagnosis     ICD-10-CM    1  S/P cervical spinal fusion  Z98 1                   Subjective: Pt notes some tightness in BLE at start of session today  Had spasms in quads for the first time last night since surgery  Objective: See treatment diary below      Assessment: Tolerated treatment well  Pt w/ improved tolerance for pulleys and blade squeezes today, does not note burning between scapulas  Pt continues to note tightness/stiffness in BLE when sitting for longer than 20min; improves w/ walking around & stretching  Patient demonstrated fatigue post treatment, exhibited good technique with therapeutic exercises and would benefit from continued PT       Plan: Continue per plan of care  Precautions: Begin to [employer] collar (have off for watching, TV, reading, sleep, etc) as of 9/15  Fall precaution  Avoid excessive flexion/extension  Lifting heavy objects >5-6 Lbs  No axial loading      * indicates exercises included in HEP      Manuals 8/25 9/1 9/8 9/15         PROM shoulders                                       Pt education 10', POC, HEP            Neuro Re-Ed             Toe taps to cone vs step             Tandem stance  20" x 2 ea 2x30"          Side stepping  X 4 laps            TKE             Static balance on foam  FA 30"x2           Core stabilization                           Ther Ex             Bike  10 min 10min 10min         UBE             Pulleys   3min flex; 3min abd 5min flex; 5min abd         Scap set/blade squeeze   5"x20 5"x20         TB rows & ext             Bicep curls   10 x            Tricep "chair push-up"             Standing hip ext & abd  X 10 ea abd x10 ea B x30 ea B         HR/TR  20 xea  x20 ea x30 ea         LAQ  3" x 15           TG             Leg press             Physioball rollout   5"x10 Standing hip flexor stretch    2x30" B         Standing calf stretch    2x30" B                      Ther Activity             Obstacle course              Weaving around cones             Step-ups             Mini-squats                          Gait Training                                       Modalities

## 2022-09-16 ENCOUNTER — TELEPHONE (OUTPATIENT)
Dept: NEUROLOGY | Facility: CLINIC | Age: 52
End: 2022-09-16

## 2022-09-19 ENCOUNTER — TELEPHONE (OUTPATIENT)
Dept: NEUROSURGERY | Facility: CLINIC | Age: 52
End: 2022-09-19

## 2022-09-19 NOTE — TELEPHONE ENCOUNTER
Patient called to confirm we received disability forms for him  I checked [employer] and with Jose Bound ans we did not receive them  LM for patient to have them faxed to the back fax [phone]

## 2022-09-21 ENCOUNTER — OFFICE VISIT (OUTPATIENT)
Dept: PHYSICAL THERAPY | Facility: CLINIC | Age: 52
End: 2022-09-21
Payer: COMMERCIAL

## 2022-09-21 DIAGNOSIS — Z98.1 S/P CERVICAL SPINAL FUSION: Primary | ICD-10-CM

## 2022-09-21 PROCEDURE — 97530 THERAPEUTIC ACTIVITIES: CPT

## 2022-09-21 PROCEDURE — 97110 THERAPEUTIC EXERCISES: CPT

## 2022-09-21 PROCEDURE — 97116 GAIT TRAINING THERAPY: CPT

## 2022-09-21 NOTE — PROGRESS NOTES
Daily Note     Today's date: 2022  Patient name: Shavon De Dios  : 1970  MRN: [id number]  Referring provider: Ida Bae PA-C  Dx:   Encounter Diagnosis     ICD-10-CM    1  S/P cervical spinal fusion  Z98 1                   Subjective: Reports falling and striking face/lip on the floor this morning before PT session  Was not wearing c-collar on at the time  Denies LOC  Has some L sided neck pain following incident  Objective: See treatment diary below      Assessment: Tolerated treatment well  Patient demonstrated fatigue post treatment and would benefit from continued PT  Pt instructed to call physician following fall he had this morning  Pt also encouraged to obtain script for OT given continued c/o fine motor and  strength deficits  Pt receptive to suggestion of use of RW after falling this morning  Completed gait training w/ clinic RW including sizing RW (reports has one at home from significant other), negotiating turns, approaching sitting surfaces, and safe transitions for sit<>stand w/ AD  Plan: Continue per plan of care  Precautions: Begin to [employer] collar (have off for watching, TV, reading, sleep, etc) as of 9/15  Fall precaution  Avoid excessive flexion/extension  Lifting heavy objects >5-6 Lbs  No axial loading      * indicates exercises included in HEP      Manuals 8/25 9/1 9/8 9/15 9/21        PROM shoulders                                       Pt education 10', POC, HEP    fall, contact physician, RW        Neuro Re-Ed             Toe taps to cone vs step             Tandem stance  20" x 2 ea 2x30"          Side stepping  X 4 laps            TKE             Static balance on foam  FA 30"x2           Core stabilization                           Ther Ex             Bike  10 min 10min 10min 10min        UBE             Pulleys   3min flex; 3min abd 5min flex; 5min abd 5min flex; 5min abd        Scap set/blade squeeze   5"x20 5"x20         TB rows & ext Bicep curls   10 x            Tricep "chair push-up"             Standing hip ext & abd  X 10 ea abd x10 ea B x30 ea B         HR/TR  20 xea  x20 ea x30 ea         LAQ  3" x 15           TG             Leg press             Physioball rollout   5"x10          Standing hip flexor stretch    2x30" B         Standing calf stretch    2x30" B                      Ther Activity             Obstacle course              Weaving around cones             Step-ups             Mini-squats                          Gait Training             W/ RW     x5 laps around clinic, turns, approach to chair                     Modalities

## 2022-09-22 ENCOUNTER — TELEPHONE (OUTPATIENT)
Dept: NEUROSURGERY | Facility: CLINIC | Age: 52
End: 2022-09-22

## 2022-09-22 NOTE — TELEPHONE ENCOUNTER
Received a call from Aurelia Murillo requesting update on his disability paperwork  Noted recent call made to the patient and vm left with back fax as no paperwork found in fax inbox  Explained that misrouting of the forms could have happened and provided back fax for him to send this paperwork to  Also requesting refill of pain medication explained that by now he should have transitioned to NSAID based on Dr Garcia Hams note 9/9  He stated and understanding  Directed him to [facility] for assistance with pain regimen if needed

## 2022-09-23 ENCOUNTER — TELEPHONE (OUTPATIENT)
Dept: NEUROLOGY | Facility: CLINIC | Age: 52
End: 2022-09-23

## 2022-09-23 NOTE — TELEPHONE ENCOUNTER
Patient called to schedule new patient appointment for muscle spasms in back and legs  Testing done  Triage intake sent

## 2022-09-29 ENCOUNTER — OFFICE VISIT (OUTPATIENT)
Dept: PHYSICAL THERAPY | Facility: CLINIC | Age: 52
End: 2022-09-29
Payer: COMMERCIAL

## 2022-09-29 DIAGNOSIS — Z98.1 S/P CERVICAL SPINAL FUSION: Primary | ICD-10-CM

## 2022-09-29 PROCEDURE — 97530 THERAPEUTIC ACTIVITIES: CPT

## 2022-09-29 PROCEDURE — 97110 THERAPEUTIC EXERCISES: CPT

## 2022-09-29 PROCEDURE — 97112 NEUROMUSCULAR REEDUCATION: CPT

## 2022-09-29 NOTE — PROGRESS NOTES
Daily Note     Today's date: 2022  Patient name: Emily Hatfield  : 1970  MRN: [id number]  Referring provider: Julia Camp PA-C  Dx:   Encounter Diagnosis     ICD-10-CM    1  S/P cervical spinal fusion  Z98 1                   Subjective: Took hot shower prior to PT session which improved muscle stiffness  Has been using RW since LV and reports no more falls  Objective: See treatment diary below      Assessment: Tolerated treatment well  Improved performance w/ RW noted w/ turns and approach to sitting surfaces  Patient demonstrated fatigue post treatment, exhibited good technique with therapeutic exercises and would benefit from continued PT  Presents today w/o [place] collar, able to initiate gentle cervical AROM  Plan: Continue per plan of care  Precautions: Begin to [employer] collar (have off for watching, TV, reading, sleep, etc) as of 9/15  Fall precaution  Avoid excessive flexion/extension  Lifting heavy objects >5-6 Lbs  No axial loading  * indicates exercises included in HEP      Manuals 8/25 9/1 9/8 9/15 9/21 9/29       PROM shoulders                                       Pt education 10', POC, HEP    fall, contact physician, RW        Neuro Re-Ed             Toe taps to cone      x30 alt         Tandem stance  20" x 2 ea 2x30"          Side stepping  X 4 laps            TKE             Static balance on foam  FA 30"x2           Core stabilization                           Ther Ex             Bike  10 min 10min 10min 10min 10min       UBE      NV       Pulleys   3min flex; 3min abd 5min flex; 5min abd 5min flex; 5min abd 5min flex; 5min abd       Scap set/blade squeeze   5"x20 5"x20  5"x30       TB rows & ext             Bicep curls   10 x     RUE 2# LUE4# 3x10 (split stance)       Tricep "chair push-up"             Cervical AROM (standing)      x10 ea       Standing hip ext & abd  X 10 ea abd x10 ea B x30 ea B         HR/TR  20 xea  x20 ea x30 ea         LAQ  3" x 15 TG             Leg press             Physioball rollout   5"x10          Standing hip flexor stretch    2x30" B  2x30" B       Standing calf stretch    2x30" B  2x30" B                    Ther Activity             Obstacle course              Weaving around cones             Step-ups      x15 B 6" fwd only       Mini-squats                          Gait Training             W/ RW     x5 laps around clinic, turns, approach to chair                     Modalities

## 2022-10-03 ENCOUNTER — TELEPHONE (OUTPATIENT)
Dept: NEUROSURGERY | Facility: CLINIC | Age: 52
End: 2022-10-03

## 2022-10-03 NOTE — TELEPHONE ENCOUNTER
Patient called office req on the status of his forms  Patient stated he was in the area and would like to stop by and pick them up since he does not live in the area  I checked with Flor Diaz and verified paperwork would be completed by the end of the day  I relayed this information to the patient who then was frustrated and stated he dropped this off on Monday and this should have been done, stated "you all need to do your job" and disconnected the phone line  Carol Espitia please called patient once paperwork is completed       Thank you,     Allyson Butts MA

## 2022-10-03 NOTE — TELEPHONE ENCOUNTER
Left detailed message making patient aware the paperwork he dropped off to our office was a duplicate and the original was already completed and faxed on 9/23/22  Also made him aware to contact his STD to make sure paperwork was received as well as if he has any questions or needs further assistance to call our office back

## 2022-10-06 ENCOUNTER — OFFICE VISIT (OUTPATIENT)
Dept: PHYSICAL THERAPY | Facility: CLINIC | Age: 52
End: 2022-10-06
Payer: COMMERCIAL

## 2022-10-06 DIAGNOSIS — M47.12 OTHER SPONDYLOSIS WITH MYELOPATHY, CERVICAL REGION: Primary | ICD-10-CM

## 2022-10-06 DIAGNOSIS — Z98.1 S/P CERVICAL SPINAL FUSION: Primary | ICD-10-CM

## 2022-10-06 PROCEDURE — 97112 NEUROMUSCULAR REEDUCATION: CPT

## 2022-10-06 PROCEDURE — 97110 THERAPEUTIC EXERCISES: CPT

## 2022-10-06 NOTE — PROGRESS NOTES
Daily Note     Today's date: 10/6/2022  Patient name: Robert Bucio  : 1970  MRN: [id number]  Referring provider: Felix Delvalle PA-C  Dx:   Encounter Diagnosis     ICD-10-CM    1  S/P cervical spinal fusion  Z98 1                   Subjective: Pt reports having return of intense full body muscle spasms like he was getting prior to surgery  Is scheduled to see someone about this 10/17/22  Objective: See treatment diary below      Assessment: Tolerated treatment well  Able to progress program to include leg press and UBE today  Pt reports feeling good workout on arm bike and enjoyed this exercise  Patient demonstrated fatigue post treatment and would benefit from continued PT  F/u regarding OT referral for fine motor and  strength; Dr Dorsey Marker 10/6/22  Plan: Continue per plan of care  F/u regarding OT referral for fine motor and  strength  Precautions: Begin to [employer] collar (have off for watching, TV, reading, sleep, etc) as of 9/15  Fall precaution  Avoid excessive flexion/extension  Lifting heavy objects >5-6 Lbs  No axial loading  * indicates exercises included in HEP      Manuals 8/25 9/1 9/8 9/15 9/21 9/29 10/6      PROM shoulders                                       Pt education 10', POC, HEP    fall, contact physician, RW        Neuro Re-Ed             Toe taps to cone      x30 alt         Tandem stance  20" x 2 ea 2x30"          Side stepping  X 4 laps            TKE             Static balance on foam  FA 30"x2           Core stabilization                           Ther Ex             Bike  10 min 10min 10min 10min 10min 10min      UBE      NV 3'/3'      Pulleys   3min flex; 3min abd 5min flex; 5min abd 5min flex; 5min abd 5min flex; 5min abd 6' total      Scap set/blade squeeze   5"x20 5"x20  5"x30       TB rows & ext             Bicep curls   10 x     RUE 2# LUE4# 3x10 (split stance) RUE 2# LUE4# 3x10 (split stance)      Tricep "chair push-up" Cervical AROM (standing)      x10 ea       Standing hip ext & abd  X 10 ea abd x10 ea B x30 ea B         HR/TR  20 xea  x20 ea x30 ea         LAQ  3" x 15           TG             Leg press       35# 2x10      Physioball rollout   5"x10          Standing hip flexor stretch    2x30" B  2x30" B       Standing calf stretch    2x30" B  2x30" B                    Ther Activity             Obstacle course              Weaving around cones             Step-ups      x15 B 6" fwd only       Mini-squats       2x10                   Gait Training             W/ RW     x5 laps around clinic, turns, approach to chair                     Modalities

## 2022-10-12 ENCOUNTER — OFFICE VISIT (OUTPATIENT)
Dept: PHYSICAL THERAPY | Facility: CLINIC | Age: 52
End: 2022-10-12
Payer: COMMERCIAL

## 2022-10-12 DIAGNOSIS — Z98.1 S/P CERVICAL SPINAL FUSION: Primary | ICD-10-CM

## 2022-10-12 PROCEDURE — 97112 NEUROMUSCULAR REEDUCATION: CPT

## 2022-10-12 PROCEDURE — 97110 THERAPEUTIC EXERCISES: CPT

## 2022-10-12 NOTE — PROGRESS NOTES
Daily Note     Today's date: 10/12/2022  Patient name: Cici Leggett  : 1970  MRN: [id number]  Referring provider: Bentley Burton PA-C  Dx:   Encounter Diagnosis     ICD-10-CM    1  S/P cervical spinal fusion  Z98 1                   Subjective: Patient noted he is still having full body spasms and plans  to discuss with doctor at next appointment as per discussed last treatment with PT VR  Patient noted that his arms felt heavy post UBE last treatment  Objective: See treatment diary below      Assessment: Tolerated treatment fair  Patient was able to perform listed exercises  Kept exercises the same this session as per last treatment  Patient demonstrated fatigue post treatment  Patient would benefit from continued PT      Plan: Continue per plan of care  Precautions: Begin to [employer] collar (have off for watching, TV, reading, sleep, etc) as of 9/15  Fall precaution  Avoid excessive flexion/extension  Lifting heavy objects >5-6 Lbs  No axial loading  * indicates exercises included in HEP      Manuals 8/25 9/1 9/8 9/15 9/21 9/29 10/6 10/12     PROM shoulders                                       Pt education 10', POC, HEP    fall, contact physician, RW        Neuro Re-Ed             Toe taps to cone      x30 alt         Tandem stance  20" x 2 ea 2x30"          Side stepping  X 4 laps            TKE             Static balance on foam  FA 30"x2           Core stabilization                           Ther Ex             Bike  10 min 10min 10min 10min 10min 10min 10 min     UBE      NV 3'/3' 3'3'     Pulleys   3min flex; 3min abd 5min flex; 5min abd 5min flex; 5min abd 5min flex; 5min abd 6' total 6' total     Scap set/blade squeeze   5"x20 5"x20  5"x30       TB rows & ext             Bicep curls   10 x     RUE 2# LUE4# 3x10 (split stance) RUE 2# LUE4# 3x10 (split stance) RUE 2# LUE4# 3x10 (split stance)     Tricep "chair push-up"             Cervical AROM (standing)      x10 ea Standing hip ext & abd  X 10 ea abd x10 ea B x30 ea B         HR/TR  20 xea  x20 ea x30 ea         LAQ  3" x 15           TG             Leg press       35# 2x10 35# 2x10     Physioball rollout   5"x10          Standing hip flexor stretch    2x30" B  2x30" B       Standing calf stretch    2x30" B  2x30" B                    Ther Activity             Obstacle course              Weaving around cones             Step-ups      x15 B 6" fwd only       Mini-squats       2x10 2 x 10                   Gait Training             W/ RW     x5 laps around clinic, turns, approach to chair                     Modalities

## 2022-10-20 ENCOUNTER — OFFICE VISIT (OUTPATIENT)
Dept: PHYSICAL THERAPY | Facility: CLINIC | Age: 52
End: 2022-10-20
Payer: COMMERCIAL

## 2022-10-20 DIAGNOSIS — Z98.1 S/P CERVICAL SPINAL FUSION: Primary | ICD-10-CM

## 2022-10-20 PROCEDURE — 97530 THERAPEUTIC ACTIVITIES: CPT

## 2022-10-20 PROCEDURE — 97110 THERAPEUTIC EXERCISES: CPT

## 2022-10-20 NOTE — PROGRESS NOTES
Daily Note     Today's date: 10/20/2022  Patient name: Guido Alford  : 1970  MRN: [id number]  Referring provider: Ky Bautista PA-C  Dx:   Encounter Diagnosis     ICD-10-CM    1  S/P cervical spinal fusion  Z98 1      Start Time: 1000  Stop Time: 1100  Total time in clinic (min): 60 minutes     Subjective: Feeling stiff today, due to weather  Full body muscle spasms are still continuing  Seeing neurosurgeon 10/21 for follow up appointment  Objective: See treatment diary below      Assessment: Pt tolerated treatment well as demo by ability to progress LE strengthening exercises  Patient demonstrated fatigue post treatment and would benefit from continued PT to improve strength, balance, and improve overall function for improved QOL  Pt scheduled for OT eval next week  NV progress LE strengthening and balance  Plan: Continue per plan of care  Precautions: Begin to [employer] collar (have off for watching, TV, reading, sleep, etc) as of 9/15  Fall precaution  Avoid excessive flexion/extension  Lifting heavy objects >5-6 Lbs  No axial loading  * indicates exercises included in HEP       Manuals 8/25 9/1 9/8 9/15 9/21 9/29 10/6 10/12 10/20    PROM shoulders                                       Pt education 10', POC, HEP    fall, contact physician, RW        Neuro Re-Ed             Toe taps to cone      x30 alt         Tandem stance  20" x 2 ea 2x30"          Side stepping  X 4 laps            TKE             Static balance on foam  FA 30"x2           Core stabilization                           Ther Ex             Bike  10 min 10min 10min 10min 10min 10min 10 min 10 min    UBE      NV 3'/3' 3'3' 3' fwd 3' bwd    Standing marches         RW 10 reps x2    Pulleys   3min flex; 3min abd 5min flex; 5min abd 5min flex; 5min abd 5min flex; 5min abd 6' total 6' total 6' total    Scap set/blade squeeze   5"x20 5"x20  5"x30   HEP    TB rows & ext             Bicep curls   10 x     RUE 2# LUE4# 3x10 (split stance) RUE 2# LUE4# 3x10 (split stance) RUE 2# LUE4# 3x10 (split stance) RUE 2# LUE4# 3x10 (split stance)    Tricep "chair push-up"             Cervical AROM (standing)      x10 ea       Standing hip ext & abd  X 10 ea abd x10 ea B x30 ea B     x10 B/L LE  1# AW    HR/TR  20 xea  x20 ea x30 ea         LAQ  3" x 15           TG             Leg press       35# 2x10 35# 2x10 45# 2x10    Physioball rollout   5"x10          Standing hip flexor stretch    2x30" B  2x30" B       Standing calf stretch    2x30" B  2x30" B   HEP                 Ther Activity             Obstacle course              Weaving around cones             Step-ups      x15 B 6" fwd only       Mini-squats       2x10 2 x 10  3x10    Patient Education         Fall, AD, stairs    Gait Training             W/ RW     x5 laps around clinic, turns, approach to chair                     Modalities

## 2022-10-21 ENCOUNTER — OFFICE VISIT (OUTPATIENT)
Dept: NEUROSURGERY | Facility: CLINIC | Age: 52
End: 2022-10-21
Payer: COMMERCIAL

## 2022-10-21 VITALS
HEART RATE: 72 BPM | DIASTOLIC BLOOD PRESSURE: 74 MMHG | WEIGHT: 174 LBS | SYSTOLIC BLOOD PRESSURE: 128 MMHG | RESPIRATION RATE: 16 BRPM | HEIGHT: 72 IN | BODY MASS INDEX: 23.57 KG/M2

## 2022-10-21 DIAGNOSIS — Z98.1 S/P CERVICAL SPINAL FUSION: Primary | ICD-10-CM

## 2022-10-21 DIAGNOSIS — G95.9 MYELOPATHY (HCC): ICD-10-CM

## 2022-10-21 DIAGNOSIS — M48.02 DEGENERATIVE CERVICAL SPINAL STENOSIS: ICD-10-CM

## 2022-10-21 DIAGNOSIS — M47.12 OTHER SPONDYLOSIS WITH MYELOPATHY, CERVICAL REGION: ICD-10-CM

## 2022-10-21 PROCEDURE — 99213 OFFICE O/P EST LOW 20 MIN: CPT | Performed by: NEUROLOGICAL SURGERY

## 2022-10-21 RX ORDER — MELOXICAM 15 MG/1
15 TABLET ORAL DAILY
COMMUNITY
Start: 2022-10-12

## 2022-10-21 RX ORDER — TAMSULOSIN HYDROCHLORIDE 0.4 MG/1
0.4 CAPSULE ORAL DAILY
COMMUNITY
Start: 2022-10-12

## 2022-10-21 NOTE — PROGRESS NOTES
Neurosurgery Office Note  Gianfranco Wang 46 y o  male MRN: [id number]      Assessment/Plan      Diagnoses and all orders for this visit:    S/P cervical spinal fusion  -     XR spine cervical complete 6+ vw flex/ext/obl; Future    Degenerative cervical spinal stenosis  -     XR spine cervical complete 6+ vw flex/ext/obl; Future    Myelopathy (HCC)  -     XR spine cervical complete 6+ vw flex/ext/obl; Future    Other spondylosis with myelopathy, cervical region  -     XR spine cervical complete 6+ vw flex/ext/obl; Future        Discussion:    Pain Score: 0-No pain - neck (discomfort), but considerable joint pain  Status post PCDF C2-T1, with C4-5 foraminotomies on 7/19/22  He did start to develop some right deltoid weakness around postop day 2  This persists presently, but has improved, strength is about 4/5 bilaterally, although the right shoulder is partially frozen in an abducted position  He has no pain  Ayaka Thornton He is participating in physical therapy, which includes range of motion of his arms etcetera  He still struggles with gait, hand dexterity etcetera  His mJOA is stable  VAS and EQ5D5L also improved  His C5 palsy should continue to improve with time      Posterior cervical incision well healed  Six week  Postop x-rays show good alignment, stability of hardware      He weaned from his collar successfully with no neck pain    He is seen Dr Hannah Osorio, who has documented he can not return to work     He does have diffuse joint pain, and was prescribed gabapentin, meloxicam, Flomax, etcetera        We will plan to see him back at 6 months postop to assess his progress etcetera and review new x-rays at that point      06/07/22 Metrics: EQ5D5L 56235=2 266; VAS 50; MJOA 9/17 09/09/22 Metrics: EQ5D5L 93943=9 562; VAS 70; MJOA 10/17    10/21/22 metrics:  EQ5D5L 08553, VAS 60, mJOA 9/17      CHIEF COMPLAINT    Chief Complaint   Patient presents with   • Follow-up       HISTORY    History of Present Illness     46y o  year old male     HPI    See Discussion    REVIEW OF SYSTEMS    Review of Systems   Constitutional: Positive for fatigue (slight improvement)  HENT: Negative  Eyes: Negative  Respiratory: Negative  Cardiovascular: Negative  Gastrointestinal: Negative  Endocrine: Negative  Genitourinary: Negative  Musculoskeletal: Positive for arthralgias (joint pain), gait problem (improving since surgery still wobbling while walking, still off balance, uses walker or cane for stability), myalgias (bilateral legs/thighs to knees, muscle cramps), neck pain (improving since surgerysharp neck pain radiates to b/l shoulders at times) and neck stiffness  Recent fall about 3 weeks ago    States at today's visit - More soreness in neck and bilateral shoulders then pain/ no change since last OV    Muscle cramping in thigs    S/p C3-6 LAMIS, POSTERIOR FUSION C2-T1on 7/19/22- HDM  Wearing Cervical Collar   Skin: Negative  Allergic/Immunologic: Negative  Neurological: Positive for weakness (bilateral arms/hands, more right then left) and numbness (and tingling on bilateral arms/hands/tips of fingers)  Negative for dizziness, syncope and headaches  Hematological: Negative  Psychiatric/Behavioral: Positive for sleep disturbance (due to pain and spasms)           Meds/Allergies     Current Outpatient Medications   Medication Sig Dispense Refill   • acetaminophen (TYLENOL) 500 mg tablet Take 1,000 mg by mouth if needed for mild pain     • docusate sodium (COLACE) 100 mg capsule Take 1 capsule (100 mg total) by mouth 2 (two) times a day (Patient taking differently: Take 100 mg by mouth if needed)  0   • gabapentin (NEURONTIN) 100 mg capsule Take 1 capsule (100 mg total) by mouth 3 (three) times a day 90 capsule 0   • meloxicam (MOBIC) 15 mg tablet Take 15 mg by mouth daily     • methocarbamol (ROBAXIN) 750 mg tablet Take 1 tablet (750 mg total) by mouth 2 (two) times a day as needed for muscle spasms 60 tablet 0   • tamsulosin (FLOMAX) 0 4 mg Take 0 4 mg by mouth daily     • finasteride (PROSCAR) 5 mg tablet Take 5 mg by mouth daily pt reports he is not taking this medication  (Patient not taking: No sig reported)     • oxyCODONE (ROXICODONE) 5 immediate release tablet Take 1 tablet (5 mg total) by mouth 2 (two) times a day as needed for severe pain Max Daily Amount: 10 mg (Patient not taking: Reported on 10/21/2022) 24 tablet 0     No current facility-administered medications for this visit  Allergies   Allergen Reactions   • Dilaudid [Hydromorphone] Anxiety, Other (See Comments) and Hypertension     Increase in temperature         PAST HISTORY    Past Medical History:   Diagnosis Date   • Enlarged prostate        Past Surgical History:   Procedure Laterality Date   • ABCESS DRAINAGE     • KNEE ARTHROSCOPY Left     as a teenager   • DC ARTHRODESIS POSTERIOR/POSTERIORLATERAL CERVICAL BELOW C2 N/A 2022    Procedure: POSTERIOR CERVICAL LAMINECTOMY C3-6, FIXATION FUSION C2-T1 (IMPULSE MONITORING); Surgeon: Leslie Bach MD;  Location: [facility];  Service: Neurosurgery       Social History     Tobacco Use   • Smoking status: Former Smoker     Packs/day: 0 50     Quit date: 9/15/2021     Years since quittin 0   • Smokeless tobacco: Never Used   • Tobacco comment: quit 9 months ago   Vaping Use   • Vaping Use: Never used   Substance Use Topics   • Alcohol use: Never   • Drug use: Not Currently       Family History   Problem Relation Age of Onset   • Hypertension Mother    • Rheum arthritis Mother    • Diabetes Father    • Anxiety disorder Father          The following portions of the patient's history were reviewed in this encounter and updated as appropriate: Past medical, surgical, family, and social history, as well as medications, allergies, and review of systems  EXAM    Vitals:Blood pressure 128/74, pulse 72, resp   rate 16, height 6' (1 829 m), weight 78 9 kg (174 lb) ,Body mass index is 23 6 kg/m²  Physical Exam  Vitals reviewed  Constitutional:       Appearance: Normal appearance  He is well-developed  HENT:      Head: Normocephalic and atraumatic  Eyes:      General: No scleral icterus  Neck:      Comments: Posterior cervical incision well healed, minimal atrophy  Cardiovascular:      Rate and Rhythm: Normal rate  Pulmonary:      Effort: Pulmonary effort is normal    Skin:     General: Skin is warm and dry  Neurological:      Mental Status: He is alert  Sensory: No sensory deficit  Psychiatric:         Behavior: Behavior normal        Neurologic Exam   Bilateral deltoid at least 4/5, although right shoulder partially frozen at less than 90° abduction  Standard cane    See discussion    MEDICAL DECISION MAKING          PLEASE NOTE:  This encounter may have been completed utilizing the [employer] Direct Speech Voice Recognition Software  Grammatical errors, random word insertions, pronoun errors and incomplete sentences are occasional consequences of the system due to software limitations, ambient noise and hardware issues  These may be missed by proof reading prior to affixing electronic signature  Any questions or concerns about the content, text or information contained within the body of this dictation should be directly addressed to the advanced practitioner or physician for clarification

## 2022-10-24 ENCOUNTER — APPOINTMENT (OUTPATIENT)
Dept: PHYSICAL THERAPY | Facility: CLINIC | Age: 52
End: 2022-10-24

## 2022-10-26 ENCOUNTER — TELEPHONE (OUTPATIENT)
Dept: NEUROSURGERY | Facility: CLINIC | Age: 52
End: 2022-10-26

## 2022-10-27 ENCOUNTER — OFFICE VISIT (OUTPATIENT)
Dept: PHYSICAL THERAPY | Facility: CLINIC | Age: 52
End: 2022-10-27
Payer: COMMERCIAL

## 2022-10-27 ENCOUNTER — EVALUATION (OUTPATIENT)
Dept: OCCUPATIONAL THERAPY | Facility: CLINIC | Age: 52
End: 2022-10-27
Payer: COMMERCIAL

## 2022-10-27 DIAGNOSIS — R29.898 WEAKNESS OF BOTH HANDS: ICD-10-CM

## 2022-10-27 DIAGNOSIS — M47.12 OTHER SPONDYLOSIS WITH MYELOPATHY, CERVICAL REGION: Primary | ICD-10-CM

## 2022-10-27 DIAGNOSIS — Z98.1 S/P CERVICAL SPINAL FUSION: Primary | ICD-10-CM

## 2022-10-27 PROCEDURE — 97530 THERAPEUTIC ACTIVITIES: CPT

## 2022-10-27 PROCEDURE — 97165 OT EVAL LOW COMPLEX 30 MIN: CPT | Performed by: OCCUPATIONAL THERAPIST

## 2022-10-27 PROCEDURE — 97110 THERAPEUTIC EXERCISES: CPT

## 2022-10-27 PROCEDURE — 97110 THERAPEUTIC EXERCISES: CPT | Performed by: OCCUPATIONAL THERAPIST

## 2022-10-27 NOTE — PROGRESS NOTES
OT Evaluation     Today's date: 10/27/2022  Patient name: Astrid Camarena  : 1970  MRN: [id number]  Referring provider: Parker Garcia MD  Dx:   Encounter Diagnosis     ICD-10-CM    1  Other spondylosis with myelopathy, cervical region  M47 12    2  Weakness of both hands  R29 898                   Assessment  Assessment details: Bryce Rodriguez is a 45 y/o RHD male presenting 3+ months post cervical fusion  Date of surgery 2022  Prior to cervical fusion, he had LE weakness  Since fusion, he reports UE weakness, fatigue, decreased sensation  Fatigue restricts ADL's and self care  Motion of the hands, wrist, FA, and elbows WFL  Shoulder motion is restricted by joint tightness and muscle weakness  Examination reveals functional motion of the elbows, FA, wrist, and hands  No atrophy of intrinsics or thenars  Decreased sensation of both hands with the right hand>left hand  Pain is described as paresthesias, constant tingling and numbness  MMT reduced in FA and wrists   strength of right less than minimal functional level  Pinch strength functional in both hands  UA to assess FMD due to sitting intolerance  Evaluation is of low complexity, due to minimal comorbidities and stable clinical presentation  Pt demonstrates good tolerance of therapy today and was provided with a written HEP focusing on wrist extension PRE and  strengthening  He was instructed to perform exercises daily  The patient demonstrates HEP instructions appropriately, verbalizes understanding, and is in agreement with the written HEP  Pt will benefit from skilled OT intervention to progress function, strength, dexterity,  and allow return to PLOF        Impairments: abnormal or restricted ROM, activity intolerance, impaired physical strength, lacks appropriate home exercise program and pain with function    Symptom irritability: lowUnderstanding of Dx/Px/POC: excellent   Prognosis: good    Goals  STG - 2 weeks  Reduce resting pain to less than 2/10  HEP compliance of daily stretches  HEP compliance in daily strengthening tasks  LTG  Achieve premorbid AROM of Fa/wrist/hand  Pain with function  less than 2/10   strength to at least 30 lbs, to allow self care independence  Reduce  sensory complaints by at least 50%  Return to PLOF with symptom control  Plan  Patient would benefit from: OT eval and skilled occupational therapy  Planned modality interventions: thermotherapy: hydrocollator packs  Planned therapy interventions: manual therapy, joint mobilization, massage, neuromuscular re-education, orthotic management and training, patient education, strengthening, stretching, therapeutic activities, therapeutic exercise, home exercise program, graded exercise, graded activity and functional ROM exercises  Duration in weeks: 6  Plan of Care beginning date: 10/27/2022  Plan of Care expiration date: 2022  Treatment plan discussed with: patient        Subjective Evaluation    History of Present Illness  Date of surgery: 2022  Mechanism of injury: surgery  Quality of life: good    Pain  Current pain ratin  Location: bilateral UE   Quality: discomfort (paresthesias)  Relieving factors: rest  Progression: improved    Social Support  Lives with: significant other    Employment status: not working (;  [employer])  Hand dominance: right    Treatments  Previous treatment: home therapy  Current treatment: occupational therapy  Patient Goals  Patient goals for therapy: increased strength, increased motion, return to work, independence with ADLs/IADLs and decreased pain          Objective     Observations     Left Wrist/Hand   Negative for atrophy and deformity  Right Wrist/Hand   Negative for atrophy and deformity  Additional Observation Details  No clawing  No thenar or intrinsic wasting       Neurological Testing     Sensation     Wrist/Hand   Left   Diminished: light touch  Paresthesia: light touch    Right   Diminished: light touch  Paresthesia: light touch    Additional Neurological Details  Patient describes UE sensation/paresthesias like hitting his "funny bone"  Constant pins and needles  Shoulder tightness described as heaviness  Decreased sensation of hands, R>L    [place] [facility] Screening    Left Ulnar and Median nerve 4 31g  Right Ulnar nerve 4 31g, Median 4 56 g    Active Range of Motion     Left Wrist   Normal active range of motion    Right Wrist   Normal active range of motion    Strength/Myotome Testing     Left Elbow   Flexion: 4+  Extension: 4+  Forearm supination: 4+  Forearm pronation: 4+    Right Elbow   Flexion: 4+  Extension: 4+  Forearm supination: 4+  Forearm pronation: 4+    Left Wrist/Hand   Wrist extension: 4+  Wrist flexion: 4+     (2nd hand position)     Trial 1: 40    Thumb Strength  Key/Lateral Pinch     Trial 1: 14 3    Right Wrist/Hand   Wrist extension: 4  Wrist flexion: 4     (2nd hand position)     Trial 1: 27 5    Thumb Strength   Key/Lateral Pinch     Trial 1: 8 8    Additional Strength Details  UA to perform 9 hole peg test due to sitting intolerance, leg cramping  Precautions:  Decreased protective sensation of hands         Date 10/27            Visit 1            Manuals                                                                 Neuro Re-Ed                                                                                                        Ther Ex 10'            HEP Wrist E PRE, yellow putty                                                                                                        Ther Activity                                       Gait Training                                       Modalities

## 2022-10-27 NOTE — PROGRESS NOTES
Daily Note     Today's date: 10/27/2022  Patient name: Rajiv Pratt  : 1970  MRN: [id number]  Referring provider: Harjinder Marshall PA-C  Dx:   Encounter Diagnosis     ICD-10-CM    1  S/P cervical spinal fusion  Z98 1      Start Time: 1145  Stop Time: 1215  Total time in clinic (min): 30 minutes    Subjective: Pt reports he fell when rushing to the bathroom and wasn't using his walker because he was in a hurry  Noted rug burn that has scabbed over on L eyebrow, however pt denies any pain, HA, or increased symptoms since fall  Pt reports he took a hot shower before coming and that is why he was late, however reported it helped loosen him up a little  Objective: See treatment diary below    Assessment: Pt tolerated treatment well as demonstrated by continued LE strengthening with AW for multiple repetitions, and improved L LE clearance with step ups onto foam to simulate clearing the rugs at home which caused his fall  Treatment included patient education on fall risk, safety and recovery  Due to limited time constraint pt only completed 30 min of treatment  Patient would benefit from continued PT to improve LE strength, balance, and improve QOL  Will continue to progress as able  Plan: Continue per plan of care  Precautions: Begin to [employer] collar (have off for watching, TV, reading, sleep, etc) as of 9/15  Fall precaution  Avoid excessive flexion/extension  Lifting heavy objects >5-6 Lbs  No axial loading  * indicates exercises included in HEP       Manuals 8/25 9/1 9/8 9/15 9/21 9/29 10/6 10/12 10/20 10/27   PROM shoulders                                       Pt education 10', POC, HEP    fall, contact physician, RW        Neuro Re-Ed             Toe taps to cone      x30 alt         Tandem stance  20" x 2 ea 2x30"          Side stepping  X 4 laps            TKE             Static balance on foam  FA 30"x2           Core stabilization              Step ups on foam             Ther Ex Bike  10 min 10min 10min 10min 10min 10min 10 min 10 min 5 min   UBE      NV 3'/3' 3'3' 3' fwd 3' bwd    Standing marches         RW 10 reps x2    Pulleys   3min flex; 3min abd 5min flex; 5min abd 5min flex; 5min abd 5min flex; 5min abd 6' total 6' total 6' total 6' total warmup   Scap set/blade squeeze   5"x20 5"x20  5"x30   HEP    TB rows & ext             Bicep curls   10 x     RUE 2# LUE4# 3x10 (split stance) RUE 2# LUE4# 3x10 (split stance) RUE 2# LUE4# 3x10 (split stance) RUE 2# LUE4# 3x10 (split stance)    Tricep "chair push-up"             Cervical AROM (standing)      x10 ea       Standing hip ext & abd  X 10 ea abd x10 ea B x30 ea B     x10 B/L LE  1# AW  2x10 B/L LE  1# AW at bar   HR/TR  20 xea  x20 ea x30 ea         LAQ  3" x 15           TG             Leg press       35# 2x10 35# 2x10 45# 2x10    Physioball rollout   5"x10          Standing hip flexor stretch    2x30" B  2x30" B       Standing calf stretch    2x30" B  2x30" B   HEP 2x 30" B/L                Ther Activity             Obstacle course              Weaving around cones             Step-ups      x15 B 6" fwd only    Foam pad 3x10, VC for L LE clearance   Mini-squats       2x10 2 x 10  3x10 3x10   Patient Education         Fall, AD, stairs Fall safety, reporting   Gait Training             W/ RW     x5 laps around clinic, turns, approach to chair                     Modalities

## 2022-11-03 ENCOUNTER — OFFICE VISIT (OUTPATIENT)
Dept: PHYSICAL THERAPY | Facility: CLINIC | Age: 52
End: 2022-11-03

## 2022-11-03 ENCOUNTER — OFFICE VISIT (OUTPATIENT)
Dept: OCCUPATIONAL THERAPY | Facility: CLINIC | Age: 52
End: 2022-11-03

## 2022-11-03 DIAGNOSIS — R29.898 WEAKNESS OF BOTH HANDS: ICD-10-CM

## 2022-11-03 DIAGNOSIS — Z98.1 S/P CERVICAL SPINAL FUSION: Primary | ICD-10-CM

## 2022-11-03 DIAGNOSIS — M47.12 OTHER SPONDYLOSIS WITH MYELOPATHY, CERVICAL REGION: Primary | ICD-10-CM

## 2022-11-03 NOTE — PROGRESS NOTES
Daily Note     Today's date: 11/3/2022  Patient name: Alma Gilliland  : 1970  MRN: [id number]  Referring provider: Diamond Cifuentes MD  Dx:   Encounter Diagnosis     ICD-10-CM    1  Other spondylosis with myelopathy, cervical region  M47 12    2  Weakness of both hands  R29 898                   Subjective:  "My hands tingle more today, less yesterday "        Objective: See treatment diary below; Initiated strengthening program today  Assessment: Tolerated treatment well  Patient would benefit from continued OT;  Mild fatigue at end of session  Plan: Progress treatment as tolerated  Progress PRE's each appointment with added reps or resistance  Precautions:  Decreased protective sensation of hands         Date 10/27 11/3           Visit 1 2           Manuals                                                                 Neuro Re-Ed                                                                                                        Ther Ex 10'  15           HEP Wrist E PRE, yellow putty             PROM 1:1  R/L wrist and hands  4'           PRE FA/Wrist  Y F bar [place]  1x10           PRE FA S/P  Y F bar S/P  1x10 ea  R/L                                                                             Ther Activity    23           Gripping  Y D flex  Each x10  Together  x10  Each hand           Gripping isometric  Dowel into putty  x20 each hand           Pinching  Velcro cubs  Full bd           Manipulation   Groove      Pegs    Full board           Dexterity  TB turning  1-6 x2 each hand           Gait Training                                       Modalities

## 2022-11-03 NOTE — PROGRESS NOTES
Daily Note     Today's date: 11/3/2022  Patient name: Chin Merlos  : 1970  MRN: [id number]  Referring provider: Michael Spence MD  Dx:   Encounter Diagnosis     ICD-10-CM    1  S/P cervical spinal fusion  Z98 1        Start Time: 1135  Stop Time: 1215  Total time in clinic (min): 40 minutes    Subjective: Pt reports he is having bad joint pain today "in all joints"  Objective: See treatment diary below  BP: 117/77mmHg  HR: 61bpm  Pain: 4-5/10 in all joints    Assessment: Tolerated treatment fair  Pt had increased knee pain 5/10 on bike for warm up which limited him throughout session requiring frequent therapeutic rest breaks  Session focused on LE strengthening and balance training  Patient would benefit from continued PT to improve functional mobility independence and QOL  Progress as able  Plan: Continue per plan of care  Precautions: Begin to [employer] collar (have off for watching, TV, reading, sleep, etc) as of 9/15  Fall precaution  Avoid excessive flexion/extension  Lifting heavy objects >5-6 Lbs  No axial loading  * indicates exercises included in HEP       Manuals 11/3 9/1 9/8 9/15 9/21 9/29 10/6 10/12 10/20 10/27   PROM shoulders                                       Pt education Fall    fall, contact physician, RW        Neuro Re-Ed             Toe taps to cone      x30 alt         Tandem stance  20" x 2 ea 2x30"          Side stepping 4 laps at bar 1# AW B/L LE X 4 laps            TKE             Static balance on foam  FA 30"x2           Core stabilization              Step ups on foam 2x10 ea LE with 1UE support            Ther Ex             Bike 5 min c/o knee stiffness 10 min 10min 10min 10min 10min 10min 10 min 10 min 5 min   UBE      NV 3'/3' 3'3' 3' fwd 3' bwd    Standing marches 3x10 B/L LE  1# AW at bar        RW 10 reps x2    Pulleys   3min flex; 3min abd 5min flex; 5min abd 5min flex; 5min abd 5min flex; 5min abd 6' total 6' total 6' total 6' total warmup   Scap set/blade squeeze   5"x20 5"x20  5"x30   HEP    TB rows & ext             Bicep curls   10 x     RUE 2# LUE4# 3x10 (split stance) RUE 2# LUE4# 3x10 (split stance) RUE 2# LUE4# 3x10 (split stance) RUE 2# LUE4# 3x10 (split stance)    Tricep "chair push-up"             Cervical AROM (standing)      x10 ea       Standing hip ext & abd 2x10 B/L LE  1# AW at bar X 10 ea abd x10 ea B x30 ea B     x10 B/L LE  1# AW  2x10 B/L LE  1# AW at bar   HR/TR  20 xea  x20 ea x30 ea         LAQ  3" x 15           TG             Leg press 45# 3x 15      35# 2x10 35# 2x10 45# 2x10    Physioball rollout   5"x10          Standing hip flexor stretch    2x30" B  2x30" B       Standing calf stretch 4x30" B/L   2x30" B  2x30" B   HEP 2x 30" B/L   Vital monitoring EB            Therapeutic rest EB            Ther Activity             Obstacle course              Weaving around cones             Step-ups      x15 B 6" fwd only    Foam pad 3x10, VC for L LE clearance   Mini-squats       2x10 2 x 10  3x10 3x10   Patient Education         Fall, AD, stairs Fall safety, reporting   Gait Training             W/ RW     x5 laps around clinic, turns, approach to chair                     Modalities

## 2022-11-10 ENCOUNTER — OFFICE VISIT (OUTPATIENT)
Dept: PHYSICAL THERAPY | Facility: CLINIC | Age: 52
End: 2022-11-10

## 2022-11-10 ENCOUNTER — OFFICE VISIT (OUTPATIENT)
Dept: OCCUPATIONAL THERAPY | Facility: CLINIC | Age: 52
End: 2022-11-10

## 2022-11-10 DIAGNOSIS — R29.898 WEAKNESS OF BOTH HANDS: ICD-10-CM

## 2022-11-10 DIAGNOSIS — Z98.1 S/P CERVICAL SPINAL FUSION: Primary | ICD-10-CM

## 2022-11-10 DIAGNOSIS — M47.12 OTHER SPONDYLOSIS WITH MYELOPATHY, CERVICAL REGION: Primary | ICD-10-CM

## 2022-11-10 NOTE — PROGRESS NOTES
Daily Note     Today's date: 11/10/2022  Patient name: Mauri Vargas  : 1970  MRN: [id number]  Referring provider: Claudio Dumont MD  Dx:   Encounter Diagnosis     ICD-10-CM    1  S/P cervical spinal fusion  Z98 1      Start Time: 1145  Stop Time: 1230  Total time in clinic (min): 45 minutes    Subjective: Pt reports he continues to get daily muscle spasms, pt advised to reach out to PCP/neurologist about mediation adjustment  He feels better today  He is considering going to get his xrays later this afternoon  Objective: See treatment diary below    Assessment: Pt ambulating wth [facility] upon arrival as he did not bring his RW  Noted inc balance however pt fatigued quickly  Pt able to progress LE exercises today focusing on strengthening and improving L LE clearance  Pt tolerated treatment well  Patient would benefit from continued PT   NV plan for re-evaluation  Plan: Continue per plan of care  Precautions: Begin to [employer] collar (have off for watching, TV, reading, sleep, etc) as of 9/15  Fall precaution  Avoid excessive flexion/extension  Lifting heavy objects >5-6 Lbs  No axial loading  * indicates exercises included in HEP       Manuals 11/3 11/10  9/15 9/21 9/29 10/6 10/12 10/20 10/27   PROM shoulders                                       Pt education Fall    fall, contact physician, RW        Neuro Re-Ed             Toe taps to cone  Toe taps to cone 1# AW B/L 20x ea    x30 alt         Tandem stance             Side stepping 4 laps at bar 1# AW B/L LE 4 laps at bar 1# AW B/L LE           TKE             Static balance on foam             Core stabilization              Step ups on foam 2x10 ea LE with 1UE support firim 2x10 ea LE with 1UE support 1 5# AW fwd and lat ea           Ther Ex             Bike 5 min c/o knee stiffness 10 min   10min 10min 10min 10min 10 min 10 min 5 min   UBE      NV 3'/3' 3'3' 3' fwd 3' bwd    Standing marches 3x10 B/L LE  1# AW at bar 3x10 B/L LE  1# AW at bar       RW 10 reps x2    Pulleys    5min flex; 5min abd 5min flex; 5min abd 5min flex; 5min abd 6' total 6' total 6' total 6' total warmup   Scap set/blade squeeze    5"x20  5"x30   HEP    TB rows & ext             Bicep curls       RUE 2# LUE4# 3x10 (split stance) RUE 2# LUE4# 3x10 (split stance) RUE 2# LUE4# 3x10 (split stance) RUE 2# LUE4# 3x10 (split stance)    Tricep "chair push-up"             Cervical AROM (standing)      x10 ea       Standing hip ext & abd 2x10 B/L LE  1# AW at bar   x30 ea B     x10 B/L LE  1# AW  2x10 B/L LE  1# AW at bar   HR/TR    x30 ea         LAQ  2  5#AW B/L           Seated hamstring curls  2  5#AW B/L on beasy board           TG             Leg press 45# 3x 15      35# 2x10 35# 2x10 45# 2x10    Physioball rollout             Standing hip flexor stretch  4x30" B/L  2x30" B  2x30" B       Standing calf stretch 4x30" B/L 4x30" B/L  2x30" B  2x30" B   HEP 2x 30" B/L   Vital monitoring EB EB           Therapeutic rest EB EB           Ther Activity             Obstacle course              Weaving around cones             Step-ups      x15 B 6" fwd only    Foam pad 3x10, VC for L LE clearance   Mini-squats       2x10 2 x 10  3x10 3x10   Patient Education         Fall, AD, stairs Fall safety, reporting   Gait Training             W/ RW     x5 laps around clinic, turns,approach to [facility]  2 laps around clinic,            Modalities

## 2022-11-10 NOTE — PROGRESS NOTES
Daily Note     Today's date: 11/10/2022  Patient name: Feng Gillespie  : 1970  MRN: [id number]  Referring provider: Peg Guzman MD  Dx:   Encounter Diagnosis     ICD-10-CM    1  Other spondylosis with myelopathy, cervical region  M47 12    2  Weakness of both hands  R29 898                   Subjective:  "My hands still tingle  Not really painful"      Objective: See treatment diary below;  Progressed strengthening program today  Assessment: Tolerated treatment well  Patient would benefit from continued OT;  Mild fatigue at end of session  Plan: Progress treatment as tolerated  Progress PRE's each appointment with added reps or resistance  Precautions:  Decreased protective sensation of hands         Date 10/27 11/3 11/10          Visit 1 2 3          Manuals                                                                 Neuro Re-Ed                                                                                                        Ther Ex 10'  15   15'          HEP Wrist E PRE, yellow putty             PROM 1:1  R/L wrist and hands  4' R/L  Wrist, hands 4'          PRE FA/Wrist  Y F bar Forgan  1x10 Y bar  Forgan  2x10          PRE FA S/P  Y F bar S/P  1x10 ea  R/L  Y F bar  2x10  #1 bar  1/2 shaft  2x10                                                                           Ther Activity    23   23'          Gripping  individ  Y D flex  Each x10  Together  x10  Each hand Y Dig flex  Each x10  Both hands          Gripping isometric  Dowel into putty  x20 each hand Dowel into putty  x25  Both hands          Pinching  Velcro cubs  Full bd G C pins  Full web  Both hands          Manipulation   Groove      Pegs    Full board Groove pegs  Full bd each hand          Gripping isometric  TB turning  1-6 x2 each hand Altagracia Tamayo #2  5 blocks  4 transfer          Gait Training                                       Modalities

## 2022-11-17 ENCOUNTER — OFFICE VISIT (OUTPATIENT)
Dept: PHYSICAL THERAPY | Facility: CLINIC | Age: 52
End: 2022-11-17

## 2022-11-17 ENCOUNTER — OFFICE VISIT (OUTPATIENT)
Dept: OCCUPATIONAL THERAPY | Facility: CLINIC | Age: 52
End: 2022-11-17

## 2022-11-17 DIAGNOSIS — R29.898 WEAKNESS OF BOTH HANDS: ICD-10-CM

## 2022-11-17 DIAGNOSIS — Z98.1 S/P CERVICAL SPINAL FUSION: Primary | ICD-10-CM

## 2022-11-17 DIAGNOSIS — M47.12 OTHER SPONDYLOSIS WITH MYELOPATHY, CERVICAL REGION: Primary | ICD-10-CM

## 2022-11-17 NOTE — PROGRESS NOTES
Daily Note     Today's date: 2022  Patient name: Balbina Hernández  : 1970  MRN: [id number]  Referring provider: Baljit Cabrera MD  Dx:   Encounter Diagnosis     ICD-10-CM    1  Other spondylosis with myelopathy, cervical region  M47 12       2  Weakness of both hands  R29 898                      Subjective:  "My hands still tingle  Not really painful" Right tingles feels stronger,   Left not as tingly, strength not as improved      Objective: See treatment diary below;  Progressed strengthening program today  STRENGTH   Roger #2  L  27 8 lbs,  R  75 8 lbs  Lat pinch L  8 8 lbs,  R  10 3      Assessment: Tolerated treatment well  Patient would benefit from continued OT;  Mild fatigue at end of session  Plan: Progress treatment as tolerated  Progress PRE's each appointment with added reps or resistance  Add shoulder AAROM for Right  Precautions:  Decreased protective sensation of hands         Date 10/27 11/3 11/10 11/17         Visit 1 2 3 4         Manuals                                                                 Neuro Re-Ed                                                                                                        Ther Ex 10'  15   15'   23'         HEP Wrist E PRE, yellow putty    Supine   Sh E/F  With cane         PROM 1:1  R/L wrist and hands  4' R/L  Wrist, hands 4' R/L  Wrist hands4'         PRE FA/Wrist  Y F bar [place]  1x10 Y bar  [place]ado  2x10 Y/R F bar  [place]  1x10  Reverse           PRE FA S/P  Y F bar S/P  1x10 ea  R/L  Y F bar  2x10  #1 bar  1/2 shaft  2x10 #1 x  20  Both hands           PRE wrist isometric    R- R F bar  L- Y F bar  P/P 2x10 each         UE PRE    L #1  Elbow f, punch out  R UA  x10                                                Ther Activity       23'   15'         Gripping  individ  Y D flex  Each x10  Together  x10  Each hand Y Dig flex  Each x10  Both hands R Red  L yellow  digiflex  x20         Gripping isometric  Dowel into putty  x20 each hand Dowel into putty  x25  Both hands Dowel into putty  x25   Each hand         Pinching  Velcro cubs  Full bd G C pins  Full web  Both hands C pins on tub YRG  x15 each hand         Manipulation   Groove      Pegs    Full board Groove pegs  Full bd each hand          Gripping isometric  TB turning  1-6 x2 each hand Pablo Matte #2  5 blocks  4 transfer          Gait Training                                       Modalities

## 2022-11-17 NOTE — PROGRESS NOTES
Daily Note     Today's date: 2022  Patient name: Zaki Menon  : 1970  MRN: [id number]  Referring provider: Mauro Graham MD  Dx:   Encounter Diagnosis     ICD-10-CM    1  S/P cervical spinal fusion  Z98 1         Start Time: 1145  Stop Time: 1230  Total time in clinic (min): 45 minutes    Subjective: Pt reports he had multiple spasms after leaving here last week and was unable to get his MRI done  Objective: See treatment diary below  Functional outcomes :  5x sit to stand: 55sec  (seconds)  Functional outcome comment: 5xSTS: must use BUE, increase L knee pain/"locking"  Functional outcomes :  5xSTS: 34sec  Functional outcome comment 5xSTS: must use BUE, no L knee pain    Assessment: Tolerated treatment fair  Noted inc pain in L hip on bike, however pt notes it improved with movement  Per testing, pt remains an increased risk for falls, however has demonstrated improved LE strength/power  Patient would benefit from continued PT to improve functional mobility independence and dec risk for falls  Plan: Continue per plan of care  Precautions: Begin to [employer] collar (have off for watching, TV, reading, sleep, etc) as of 9/15  Fall precaution  Avoid excessive flexion/extension  Lifting heavy objects >5-6 Lbs  No axial loading  * indicates exercises included in HEP     Manuals 11/3 11/10 11/17  9/21 9/29 10/6 10/12 10/20 10/27   PROM shoulders                                       Pt education Fall  reaching out to PCP about additional resources to manage spasms  fall, contact physician, RW        Neuro Re-Ed             Toe taps to cone  Toe taps to cone 1# AW B/L 20x ea    x30 alt         Tandem stance             Side stepping 4 laps at bar 1# AW B/L LE 4 laps at bar 1# AW B/L LE 4 laps at bar 1# AW B/L LE          TKE             Static balance on foam             Core stabilization              Step ups on foam 2x10 ea LE with 1UE support firim 2x10 ea LE with 1UE support 1 5# AW fwd and lat ea [id number] ea LE with 1UE support 1 5# AW fwd and lat ea          Ther Ex             Bike 5 min c/o knee stiffness 10 min  10 min, L hip pain  10min 10min 10min 10 min 10 min 5 min   UBE      NV 3'/3' 3'3' 3' fwd 3' bwd    Standing marches 3x10 B/L LE  1# AW at bar 3x10 B/L LE  1# AW at bar 3x10 B/L LE  1# AW at bar      RW 10 reps x2    Pulleys     5min flex; 5min abd 5min flex; 5min abd 6' total 6' total 6' total 6' total warmup   Scap set/blade squeeze      5"x30   HEP    TB rows & ext             Bicep curls       RUE 2# LUE4# 3x10 (split stance) RUE 2# LUE4# 3x10 (split stance) RUE 2# LUE4# 3x10 (split stance) RUE 2# LUE4# 3x10 (split stance)    Tricep "chair push-up"             Cervical AROM (standing)      x10 ea       Standing hip ext & abd 2x10 B/L LE  1# AW at bar  2x10 B/L LE  1# AW at bar      x10 B/L LE  1# AW  2x10 B/L LE  1# AW at bar   HR/TR             LAQ  2  5#AW B/L           Seated hamstring curls  2  5#AW B/L on beasy board           TG             Leg press 45# 3x 15  45# 3x15    35# 2x10 35# 2x10 45# 2x10    Physioball rollout             Standing hip flexor stretch  4x30" B/L    2x30" B       Standing calf stretch 4x30" B/L 4x30" B/L 4x30" B/L   2x30" B   HEP 2x 30" B/L   Vital monitoring EB EB EB          Therapeutic rest EB EB EB          Ther Activity             Obstacle course              Weaving around cones             Step-ups      x15 B 6" fwd only    Foam pad 3x10, VC for L LE clearance   Mini-squats   3x10    2x10 2 x 10  3x10 3x10   Patient Education         Fall, AD, stairs Fall safety, reporting   Gait Training             W/ RW     x5 laps around clinic, turns,approach to [facility]  2 laps around clinic,            Modalities

## 2022-11-23 ENCOUNTER — APPOINTMENT (OUTPATIENT)
Dept: OCCUPATIONAL THERAPY | Facility: CLINIC | Age: 52
End: 2022-11-23

## 2022-11-23 ENCOUNTER — OFFICE VISIT (OUTPATIENT)
Dept: PHYSICAL THERAPY | Facility: CLINIC | Age: 52
End: 2022-11-23

## 2022-11-23 DIAGNOSIS — Z98.1 S/P CERVICAL SPINAL FUSION: Primary | ICD-10-CM

## 2022-11-23 NOTE — PROGRESS NOTES
Daily Note     Today's date: 2022  Patient name: Darian Kimball  : 1970  MRN: [id number]  Referring provider: Sidra Ngo MD  Dx:   Encounter Diagnosis     ICD-10-CM    1  S/P cervical spinal fusion  Z98 1         Start Time: 1100  Stop Time: 1145  Total time in clinic (min): 45 minutes    Subjective: Pt reports he is feeling great today, as he slept well last night  Objective: See treatment diary below    Assessment: Pt tolerated treatment well  Pt able to complete bike for 10 min without pain, and at an inc pace demonstrating improved cardiovascular endurance  He was able to progress LE strengthening exercises increasing weight without pain  Noted dec rest breaks throughout session, and pt reports dec muscle spasms throughout  Patient would benefit from continued PT to improve functional mobility independence and improve QOL  Plan: Continue per plan of care  Precautions: Begin to [employer] collar (have off for watching, TV, reading, sleep, etc) as of 9/15  Fall precaution  Avoid excessive flexion/extension  Lifting heavy objects >5-6 Lbs  No axial loading  * indicates exercises included in HEP     Manuals 11/3 11/10 11/17 11/23  9/29 10/6 10/12 10/20 10/27   PROM shoulders                                       Pt education Fall  reaching out to PCP about additional resources to manage spasms          Neuro Re-Ed             Toe taps to cone  Toe taps to cone 1# AW B/L 20x ea  Toe taps to cone 1 5# AW B/L 20x ea  x30 alt         Tandem stance             Side stepping 4 laps at bar 1# AW B/L LE 4 laps at bar 1# AW B/L LE 4 laps at bar 1# AW B/L LE 4 laps at bar 1 5# AW B/L LE         TKE             Static balance on foam             Core stabilization              Step ups on foam 2x10 ea LE with 1UE support firim 2x10 ea LE with 1UE support 1 5# AW fwd and lat ea [id number] ea LE with 1UE support 1 5# AW fwd and lat ea [id number] ea LE with 1UE support 1 5# AW fwd and lat ea Ther Ex             Bike 5 min c/o knee stiffness 10 min  10 min, L hip pain 10 min  10min 10min 10 min 10 min 5 min   UBE      NV 3'/3' 3'3' 3' fwd 3' bwd    Standing marches 3x10 B/L LE  1# AW at bar 3x10 B/L LE  1# AW at bar 3x10 B/L LE  1# AW at bar 3x10 B/L LE  1 5# AW at bar     RW 10 reps x2    Pulleys      5min flex; 5min abd 6' total 6' total 6' total 6' total warmup   Scap set/blade squeeze      5"x30   HEP    TB rows & ext             Bicep curls       RUE 2# LUE4# 3x10 (split stance) RUE 2# LUE4# 3x10 (split stance) RUE 2# LUE4# 3x10 (split stance) RUE 2# LUE4# 3x10 (split stance)    Tricep "chair push-up"             Cervical AROM (standing)      x10 ea       Standing hip ext & abd 2x10 B/L LE  1# AW at bar  2x10 B/L LE  1# AW at bar 2x10 B/L LE  1 5# AW at bar     x10 B/L LE  1# AW  2x10 B/L LE  1# AW at bar   HR/TR             LAQ  2  5#AW B/L           Seated hamstring curls  2  5#AW B/L on beasy board           TG             Leg press 45# 3x 15  45# 3x15 45# 3x15   35# 2x10 35# 2x10 45# 2x10    Physioball rollout             Standing hip flexor stretch  4x30" B/L    2x30" B       Standing calf stretch 4x30" B/L 4x30" B/L 4x30" B/L   2x30" B   HEP 2x 30" B/L   Vital monitoring EB EB EB          Therapeutic rest EB EB EB          Ther Activity             Obstacle course              Weaving around cones             Step-ups   x15 B 4" fwd only  1 5# AW B/L x15 B 4" fwd only  1 5# AW B/L  x15 B 6" fwd only    Foam pad 3x10, VC for L LE clearance   Mini-squats   3x10    2x10 2 x 10  3x10 3x10   Patient Education         Fall, AD, stairs Fall safety, reporting   Gait Training             W/ RW             SPC  2 laps around clinic,            Modalities

## 2022-12-01 ENCOUNTER — OFFICE VISIT (OUTPATIENT)
Dept: PHYSICAL THERAPY | Facility: CLINIC | Age: 52
End: 2022-12-01

## 2022-12-01 ENCOUNTER — OFFICE VISIT (OUTPATIENT)
Dept: OCCUPATIONAL THERAPY | Facility: CLINIC | Age: 52
End: 2022-12-01

## 2022-12-01 ENCOUNTER — HOSPITAL ENCOUNTER (OUTPATIENT)
Dept: RADIOLOGY | Facility: HOSPITAL | Age: 52
End: 2022-12-01

## 2022-12-01 DIAGNOSIS — R29.898 WEAKNESS OF BOTH HANDS: ICD-10-CM

## 2022-12-01 DIAGNOSIS — G95.9 MYELOPATHY (HCC): ICD-10-CM

## 2022-12-01 DIAGNOSIS — Z98.1 S/P CERVICAL SPINAL FUSION: ICD-10-CM

## 2022-12-01 DIAGNOSIS — Z98.1 S/P CERVICAL SPINAL FUSION: Primary | ICD-10-CM

## 2022-12-01 DIAGNOSIS — M48.02 DEGENERATIVE CERVICAL SPINAL STENOSIS: ICD-10-CM

## 2022-12-01 DIAGNOSIS — M47.12 OTHER SPONDYLOSIS WITH MYELOPATHY, CERVICAL REGION: ICD-10-CM

## 2022-12-01 DIAGNOSIS — M47.12 OTHER SPONDYLOSIS WITH MYELOPATHY, CERVICAL REGION: Primary | ICD-10-CM

## 2022-12-01 NOTE — PROGRESS NOTES
Daily Note     Today's date: 2022  Patient name: Darian Valentin  : 1970  MRN: [id number]  Referring provider: Torie Richardson MD  Dx:   Encounter Diagnosis     ICD-10-CM    1  S/P cervical spinal fusion  Z98 1         Start Time: 1045  Stop Time: 1130  Total time in clinic (min): 45 minutes    Subjective: Pt reports he had a few bad days after a long car ride down to [place] for the holiday  Reports he locked up and had spasms and it took him a few days to recover  He has an MRI right after this appointment  Objective: See treatment diary below    Assessment: Pt tolerated treatment fair  Noted inc pain in B/L LEs and treatment modified due to inc pain  Able to continue LE ROM/strengthening training  Pt demonstrated improved balance with SLS  Pt did report at end of session he had dec pain and felt loosened up  Patient would benefit from continued PT to improve functional mobility independence and improve QOL  Plan: Continue per plan of care  Precautions: Begin to [employer] collar (have off for watching, TV, reading, sleep, etc) as of 9/15  Fall precaution  Avoid excessive flexion/extension  Lifting heavy objects >5-6 Lbs   No axial loading      * indicates exercises included in HEP     Manuals 11/3 11/10 11/17 11/23  12/1  10/6 10/12 10/20 10/27   PROM shoulders                                                                    Pt education Fall   reaching out to PCP about additional resources to manage spasms                Neuro Re-Ed                      Toe taps to cone   Toe taps to cone 1# AW B/L 20x ea   Toe taps to cone 1 5# AW B/L 20x ea  Toe taps to cone 1 5# AW B/L 20x ea            Tandem stance                      Side stepping 4 laps at bar 1# AW B/L LE 4 laps at bar 1# AW B/L LE 4 laps at bar 1# AW B/L LE 4 laps at bar 1 5# AW B/L LE  2 laps at bar 1 5# AW B/L LE  With cone taps x6 with step            TKE                      Static balance on foam                      Core stabilization                       Step ups on foam 2x10 ea LE with 1UE support firim 2x10 ea LE with 1UE support 1 5# AW fwd and lat ea [id number] ea LE with 1UE support 1 5# AW fwd and lat ea [id number] ea LE with 1UE support 1 5# AW fwd and lat ea  [id number] ea LE with 1UE support 1# AW fwd and lat ea            Ther Ex                      Bike 5 min c/o knee stiffness 10 min  10 min, L hip pain 10 min  8 min improve ROM, stopped at 8 min due to inc B/L LE pain  10min 10 min 10 min 5 min   UBE            3'/3' 3'3' 3' fwd 3' bwd     Standing marches 3x10 B/L LE  1# AW at bar 3x10 B/L LE  1# AW at bar 3x10 B/L LE  1# AW at bar 3x10 B/L LE  1 5# AW at bar  3x10 B/L LE  1# AW at bar      RW 10 reps x2     Pulleys            6' total 6' total 6' total 6' total warmup   Scap set/blade squeeze                HEP     TB rows & ext                      Bicep curls             RUE 2# LUE4# 3x10 (split stance) RUE 2# LUE4# 3x10 (split stance) RUE 2# LUE4# 3x10 (split stance)     Tricep "chair push-up"                      Cervical AROM (standing)                      Standing hip ext & abd 2x10 B/L LE  1# AW at bar   2x10 B/L LE  1# AW at bar 2x10 B/L LE  1 5# AW at bar  3x10 B/L LE  1# AW at bar       x10 B/L LE  1# AW  2x10 B/L LE  1# AW at bar   HR/TR                       LAQ   2  5#AW B/L                   Seated hamstring curls   2  5#AW B/L on beasy board     1  5#AW B/L on beasy board             TG                       Leg press 45# 3x 15   45# 3x15 45# 3x15     35# 2x10 35# 2x10 45# 2x10     Physioball rollout                      Standing hip flexor stretch   4x30" B/L                  Standing calf stretch 4x30" B/L 4x30" B/L 4x30" B/L    4x30" B/L      HEP 2x 30" B/L   Hamstring stretch     4x30" B/L        Vital monitoring EB EB EB                Therapeutic rest EB EB EB    EB            Mini squats 2UE support     3x10        Ther Activity                      Obstacle course                     Weaving around cones                      Step-ups     x15 B 4" fwd only  1 5# AW B/L x15 B 4" fwd only  1 5# AW B/L          Foam pad 3x10, VC for L LE clearance   Mini-squats     3x10       2x10 2 x 10  3x10 3x10   Patient Education                 Fall, AD, stairs Fall safety, reporting   Gait Training                       W/ RW                       SPC   2 laps around clinic,                    Modalities

## 2022-12-01 NOTE — PROGRESS NOTES
Daily Note     Today's date: 2022  Patient name: Mauri Vargas  : 1970  MRN: [id number]  Referring provider: Claudio Dumont MD  Dx:   Encounter Diagnosis     ICD-10-CM    1  Other spondylosis with myelopathy, cervical region  M47 12       2  Weakness of both hands  R29 898           Start Time: 1005  Stop Time: 1045  Total time in clinic (min): 40 minutes    Subjective:  "I'm a lot better than I was when I first had the surgery, but it takes time "      Objective: See treatment diary below;  Progressed strengthening program today  Assessment: Tolerated treatment well  Patient would benefit from continued OT  Patient reported fatigue with strengthening exercises  He was instructed to take rest breaks as needed  No increases to TE this date  Plan: Progress treatment as tolerated  Progress PRE's each appointment with added reps or resistance  Add shoulder AAROM for Right  Precautions:  Decreased protective sensation of hands         Date 10/27 11/3 11/10 11/17 12/1        Visit 1 2 3 4 5        Manuals                                                                 Neuro Re-Ed                                                                                                        Ther Ex 10'  15   15'    23'        HEP Wrist E PRE, yellow putty    Supine   Sh E/F  With cane         PROM 1:1  R/L wrist and hands  4' R/L  Wrist, hands 4' R/L  Wrist hands4' R/L  Wrist hands 4'        PRE FA/Wrist  Y F bar Riley  1x10 Y bar  Riley  2x10 Y/R F bar  Riley  1x10  Reverse   Y/R F bar  Riley  1x10  Reverse        PRE FA S/P  Y F bar S/P  1x10 ea  R/L  Y F bar  2x10  #1 bar  1/2 shaft  2x10 #1 x  20  Both hands   #1 x  20  Both hands        PRE wrist isometric    R- R F bar  L- Y F bar  P/P 2x10 each R- R F bar  L- Y F bar  P/P 2x10 each        UE PRE    L #1  Elbow f, punch out  R UA  x10                                                Ther Activity    '   15' 15' Gripping  individ  Y D flex  Each x10  Together  x10  Each hand Y Dig flex  Each x10  Both hands R Red  L yellow  digiflex  x20 R Red  L yellow  digiflex  x20        Gripping isometric  Dowel into putty  x20 each hand Dowel into putty  x25  Both hands Dowel into putty  x25   Each hand Dowel into putty  x25   Each hand        Pinching  Velcro cubs  Full bd G C pins  Full web  Both hands C pins on tub YRG  x15 each hand         Manipulation   Groove      Pegs    Full board Groove pegs  Full bd each hand          Gripping isometric  TB turning  1-6 x2 each hand Gray #2  5 blocks  4 transfer  Jakub Height #2  5 blocks  4 transfer        Gait Training                                       Modalities

## 2022-12-08 ENCOUNTER — OFFICE VISIT (OUTPATIENT)
Dept: OCCUPATIONAL THERAPY | Facility: CLINIC | Age: 52
End: 2022-12-08

## 2022-12-08 ENCOUNTER — OFFICE VISIT (OUTPATIENT)
Dept: PHYSICAL THERAPY | Facility: CLINIC | Age: 52
End: 2022-12-08

## 2022-12-08 DIAGNOSIS — R29.898 WEAKNESS OF BOTH HANDS: ICD-10-CM

## 2022-12-08 DIAGNOSIS — Z98.1 S/P CERVICAL SPINAL FUSION: Primary | ICD-10-CM

## 2022-12-08 DIAGNOSIS — M47.12 OTHER SPONDYLOSIS WITH MYELOPATHY, CERVICAL REGION: Primary | ICD-10-CM

## 2022-12-08 NOTE — PROGRESS NOTES
Daily Note     Today's date: 2022  Patient name: Julieta Hamm  : 1970  MRN: [id number]  Referring provider: Radha Recio MD  Dx:   Encounter Diagnosis     ICD-10-CM    1  Other spondylosis with myelopathy, cervical region  M47 12       2  Weakness of both hands  R29 895                      Subjective:  "My back is really bothering me today "      Objective: See treatment diary below;  Treatment session shortened today per pt request        Assessment: Tolerated treatment well  Patient would benefit from continued OT  Patient reported fatigue with strengthening exercises  He was instructed to take rest breaks as needed  No increases to TE this date  Plan: Progress treatment as tolerated  Progress PRE's each appointment with added reps or resistance  Add shoulder AAROM for Right  Precautions:  Decreased protective sensation of hands         Date 10/27 11/3 11/10 11/17 12/1 12/8       Visit 1 2 3 4 5 6       Manuals                                                                 Neuro Re-Ed                                                                                                        Ther Ex 10'  15   15'   23' 23' 12'       HEP Wrist E PRE, yellow putty    Supine   Sh E/F  With cane         PROM 1:1  R/L wrist and hands  4' R/L  Wrist, hands 4' R/L  Wrist hands4' R/L  Wrist hands 4'        PRE FA/Wrist  Y F bar [place]  1x10 Y bar  [place]  2x10 Y/R F bar  [place]  1x10  Reverse   Y/R F bar  [place]  1x10  Reverse Y/R F bar  [place]  15x  Reverse       PRE FA S/P  Y F bar S/P  1x10 ea  R/L  Y F bar  2x10  #1 bar  1/2 shaft  2x10 #1 x  20  Both hands   #1 x  20  Both hands        PRE wrist isometric    R- R F bar  L- Y F bar  P/P 2x10 each R- R F bar  L- Y F bar  P/P 2x10 each R - RFB  L - YFB P/P 2x10 each       UE PRE    L #1  Elbow f, punch out  R UA  x10                                                Ther Activity    23   23'   15' 15' 18'       Gripping  individ Y D flex  Each x10  Together  x10  Each hand Y Dig flex  Each x10  Both hands R Red  L yellow  digiflex  x20 R Red  L yellow  digiflex  x20 R Red  L yellow  digiflex  x20       Gripping isometric  Dowel into putty  x20 each hand Dowel into putty  x25  Both hands Dowel into putty  x25   Each hand Dowel into putty  x25   Each hand Dowel into putty  x25   Each hand       Pinching  Velcro cubs  Full bd G C pins  Full web  Both hands C pins on tub YRG  x15 each hand  C pins on tub YRG  x15 each hand; Blue 5x       Manipulation   Groove      Pegs    Full board Groove pegs  Full bd each hand   Groove pegs  Full bd each hand       Gripping isometric  TB turning  1-6 x2 each hand Gray #2  5 blocks  4 transfer  Luis Miguel Marshall #2  5 blocks  4 transfer        Gait Training                                       Modalities

## 2022-12-08 NOTE — PROGRESS NOTES
Daily Note     Today's date: 2022  Patient name: Bryan Huntley  : 1970  MRN: [id number]  Referring provider: Josselin Forde MD  Dx:   Encounter Diagnosis     ICD-10-CM    1  S/P cervical spinal fusion  Z98 1         Start Time: 1145  Stop Time: 1225  Total time in clinic (min): 40 minutes    Subjective: Pt reports nightly spasms that have been so bad he hasn't slept  Objective: See treatment diary below    Assessment: Pt tolerated treatment well  Able to continue LE strengthening exercises without increased pain  He was able to tolerate increased AW and # on leg press  Patient would benefit from continued PT to maximize functional independence  Plan: Continue per plan of care  Precautions: Begin to [employer] collar (have off for watching, TV, reading, sleep, etc) as of 9/15  Fall precaution  Avoid excessive flexion/extension  Lifting heavy objects >5-6 Lbs   No axial loading      * indicates exercises included in HEP     Manuals 11/3 11/10 11/17 11/23  12/1 12/8  10/12 10/20 10/27   PROM shoulders                                                                 Pt education Fall   reaching out to PCP about additional resources to manage spasms               Neuro Re-Ed                     Toe taps to cone   Toe taps to cone 1# AW B/L 20x ea   Toe taps to cone 1 5# AW B/L 20x ea  Toe taps to cone 1 5# AW B/L 20x ea           Tandem stance                     Side stepping 4 laps at bar 1# AW B/L LE 4 laps at bar 1# AW B/L LE 4 laps at bar 1# AW B/L LE 4 laps at bar 1 5# AW B/L LE  2 laps at bar 1 5# AW B/L LE  2 laps at bar 1 5# AW B/L LE          TKE                     Static balance on foam                     Core stabilization                      Step ups on foam 2x10 ea LE with 1UE support firim 2x10 ea LE with 1UE support 1 5# AW fwd and lat ea [id number] ea LE with 1UE support 1 5# AW fwd and lat ea [id number] ea LE with 1UE support 1 5# AW fwd and lat ea  [id number] ea LE with 1UE support 1 5# AW fwd and lat ea           Ther Ex                     Bike 5 min c/o knee stiffness 10 min  10 min, L hip pain 10 min  8 min improve ROM, stopped at 8 min due to inc B/L LE pain 10'  10 min 10 min 5 min   UBE             3'3' 3' fwd 3' bwd     Standing marches 3x10 B/L LE  1# AW at bar 3x10 B/L LE  1# AW at bar 3x10 B/L LE  1# AW at bar 3x10 B/L LE  1 5# AW at bar  3x10 B/L LE  1 5# AW at bar 3x10 B/L LE  1 5# AW at bar    RW 10 reps x2     Pulleys             6' total 6' total 6' total warmup   Scap set/blade squeeze               HEP     TB rows & ext                     Bicep curls              RUE 2# LUE4# 3x10 (split stance) RUE 2# LUE4# 3x10 (split stance)     Tricep "chair push-up"                     Cervical AROM (standing)                     Standing hip ext & abd 2x10 B/L LE  1# AW at bar   2x10 B/L LE  1# AW at bar 2x10 B/L LE  1 5# AW at bar  3x10 B/L LE  1 5# AW at bar  3x10 B/L LE  1 5# AW at bar    x10 B/L LE  1# AW  2x10 B/L LE  1# AW at bar   HR/TR                      LAQ   2  5#AW B/L                  Seated hamstring curls   2  5#AW B/L on beasy board     1  5#AW B/L on beasy board            TG                      Leg press 45# 3x 15   45# 3x15 45# 3x15    65# 2x10  75# x10  35# 2x10 45# 2x10     Physioball rollout                     Standing hip flexor stretch   4x30" B/L                 Standing calf stretch 4x30" B/L 4x30" B/L 4x30" B/L    4x30" B/L     HEP 2x 30" B/L   Hamstring stretch     4x30" B/L 4x30" B/L       Vital monitoring EB EB EB               Therapeutic rest EB EB EB    EB           Mini squats 2UE support     3x10 3x10       Ther Activity                     Obstacle course                      Weaving around cones                     Step-ups     x15 B 4" fwd only  1 5# AW B/L x15 B 4" fwd only  1 5# AW B/L         Foam pad 3x10, VC for L LE clearance   Mini-squats     3x10        2 x 10  3x10 3x10   Patient Education                Fall, AD, stairs Fall safety, reporting   Gait Training                      W/ RW                      SPC   2 laps around clinic,                   Modalities

## 2022-12-15 ENCOUNTER — APPOINTMENT (OUTPATIENT)
Dept: PHYSICAL THERAPY | Facility: CLINIC | Age: 52
End: 2022-12-15

## 2022-12-15 ENCOUNTER — APPOINTMENT (OUTPATIENT)
Dept: OCCUPATIONAL THERAPY | Facility: CLINIC | Age: 52
End: 2022-12-15

## 2022-12-22 ENCOUNTER — APPOINTMENT (OUTPATIENT)
Dept: PHYSICAL THERAPY | Facility: CLINIC | Age: 52
End: 2022-12-22

## 2022-12-22 ENCOUNTER — APPOINTMENT (OUTPATIENT)
Dept: OCCUPATIONAL THERAPY | Facility: CLINIC | Age: 52
End: 2022-12-22

## 2022-12-29 ENCOUNTER — APPOINTMENT (OUTPATIENT)
Dept: PHYSICAL THERAPY | Facility: CLINIC | Age: 52
End: 2022-12-29

## 2022-12-29 ENCOUNTER — APPOINTMENT (OUTPATIENT)
Dept: OCCUPATIONAL THERAPY | Facility: CLINIC | Age: 52
End: 2022-12-29

## 2023-01-03 ENCOUNTER — OFFICE VISIT (OUTPATIENT)
Dept: PHYSICAL THERAPY | Facility: CLINIC | Age: 53
End: 2023-01-03

## 2023-01-03 ENCOUNTER — OFFICE VISIT (OUTPATIENT)
Dept: OCCUPATIONAL THERAPY | Facility: CLINIC | Age: 53
End: 2023-01-03

## 2023-01-03 DIAGNOSIS — Z98.1 S/P CERVICAL SPINAL FUSION: ICD-10-CM

## 2023-01-03 DIAGNOSIS — R29.898 WEAKNESS OF BOTH HANDS: ICD-10-CM

## 2023-01-03 DIAGNOSIS — M54.12 CERVICAL RADICULOPATHY: Primary | ICD-10-CM

## 2023-01-03 DIAGNOSIS — M54.16 LUMBAR RADICULOPATHY: ICD-10-CM

## 2023-01-03 DIAGNOSIS — M47.12 OTHER SPONDYLOSIS WITH MYELOPATHY, CERVICAL REGION: Primary | ICD-10-CM

## 2023-01-03 NOTE — PROGRESS NOTES
OT Re-Evaluation     Today's date: 1/3/2023  Patient name: Amparo Maznanares  : 1970  MRN: [id number]  Referring provider: Ton Trujillo MD  Dx:   Encounter Diagnosis     ICD-10-CM    1  Other spondylosis with myelopathy, cervical region  M47 12       2  Weakness of both hands  R29 894                      Assessment  Assessment details: Lisa Bello is a 47 y/o RHD male presenting 3+ months post cervical fusion  Date of surgery 2022  Prior to cervical fusion, he had LE weakness  Since fusion, he reports UE weakness, fatigue, decreased sensation  Fatigue restricts ADL's and self care  Motion of the hands, wrist, FA, and elbows WFL  Shoulder motion is restricted by joint tightness and muscle weakness  Examination reveals functional motion of the elbows, FA, wrist, and hands  No atrophy of intrinsics or thenars  Decreased sensation of both hands with the right hand>left hand  Pain is described as paresthesias, constant tingling and numbness  MMT reduced in FA and wrists   strength of right less than minimal functional level  Pinch strength functional in both hands  UA to assess FMD due to sitting intolerance  Evaluation is of low complexity, due to minimal comorbidities and stable clinical presentation  Pt demonstrates good tolerance of therapy today and was provided with a written HEP focusing on wrist extension PRE and  strengthening  He was instructed to perform exercises daily  The patient demonstrates HEP instructions appropriately, verbalizes understanding, and is in agreement with the written HEP  Pt will benefit from skilled OT intervention to progress function, strength, dexterity,  and allow return to PLOF         1/3/2023 - Lisa Bello has been seen for 7 OT visits since his initial evaluation on 10/27/22  AROM is WFL in B/L hands, wrists, forearms and elbows   Lisa Bello presents with improved sensation through the median and ulnar nerve distribution in both hands and BUE strength is steadily improving  Pt reports improving functional use of BUEs, stating he is now able to lift/carry heavier objects (trash bags) and is able to attempt wall push ups  Lisa Ferguson would benefit from continued skilled OT to further improve functional strength and enable return to previous level of function  Impairments: activity intolerance, impaired physical strength, lacks appropriate home exercise program and pain with function    Symptom irritability: lowUnderstanding of Dx/Px/POC: excellent   Prognosis: good    Goals  STG - 2 weeks  Reduce resting pain to less than 2/10 - MET  HEP compliance of daily stretches - MET  HEP compliance in daily strengthening tasks - MET    LTG  Achieve premorbid AROM of Fa/wrist/hand - PROGRESSING  Pain with function  less than 2/10 - PROGRESSING   strength to at least 30 lbs, to allow self care independence - MET  Reduce  sensory complaints by at least 50% - PROGRESSING  Return to PLOF with symptom control  - PROGRESSING    1/3/2023: Cont with unmet goals stated above    Updated STGs/LTGs (to be achieved in 4 weeks):  1  Increase MMT by 1/2 grade in affected planes  2   Pt will be independent with upgraded HEP to maintain progress after discharge        Plan  Patient would benefit from: skilled occupational therapy  Planned modality interventions: thermotherapy: hydrocollator packs  Planned therapy interventions: manual therapy, joint mobilization, massage, neuromuscular re-education, orthotic management and training, patient education, strengthening, stretching, therapeutic activities, therapeutic exercise, home exercise program, graded exercise, graded activity and functional ROM exercises  Frequency: 1x week  Duration in weeks: 8  Plan of Care beginning date: 12/8/2022  Plan of Care expiration date: 2/2/2023  Treatment plan discussed with: patient        Subjective Evaluation    History of Present Illness  Date of surgery: 7/19/2022  Mechanism of injury: surgery  Quality of life: good    Pain  No pain reported  Location: bilateral UE   Quality: discomfort (soreness)  Relieving factors: rest  Progression: improved    Social Support  Lives with: significant other    Employment status: not working (;  [employer])  Hand dominance: right    Treatments  Previous treatment: home therapy  Current treatment: occupational therapy  Patient Goals  Patient goals for therapy: increased strength, increased motion, return to work, independence with ADLs/IADLs and decreased pain          Objective     Observations     Left Wrist/Hand   Negative for atrophy and deformity  Right Wrist/Hand   Negative for atrophy and deformity  Additional Observation Details  No clawing  No thenar or intrinsic wasting  Neurological Testing     Sensation     Wrist/Hand   Left   Diminished: light touch  Paresthesia: light touch    Right   Diminished: light touch  Paresthesia: light touch    Additional Neurological Details  Patient describes UE sensation/paresthesias like hitting his "funny bone"  Constant pins and needles  Shoulder tightness described as heaviness    Decreased sensation of hands, R>L      1/3/2023: Jack Christensen Screening    Left:  Thumb = 3 61  IF = 2 83  MF = 2 83  RF (median/ulnar) = 2 83  SF = 2 83    Right:  Thumb = 3 61  IF = 2 83  MF = 2 83  RF (median/ulnar) = 2 83  SF = 2 83    Active Range of Motion     Left Wrist   Normal active range of motion    Right Wrist   Normal active range of motion    Strength/Myotome Testing     Left Elbow   Flexion: 4+  Extension: 4+  Forearm supination: 4+  Forearm pronation: 4+    Right Elbow   Flexion: 4  Extension: 4+  Forearm supination: 4  Forearm pronation: 4+    Left Wrist/Hand   Wrist extension: 4  Wrist flexion: 4     (2nd hand position)     Trial 1: 38 2    Trial 2: 38 9    Thumb Strength  Key/Lateral Pinch     Trial 1: 18 3    Trial 2: 17 4    Right Wrist/Hand   Wrist extension: 4+  Wrist flexion: 4+     (2nd hand position)     Trial 1: 66 1    Trial 2: 59 9    Thumb Strength   Key/Lateral Pinch     Trial 1: 13 8    Trial 2: 11    Additional Strength Details  9 hole peg test:  R = 27 02 sec  L = 30 8 sec         Daily Note     Today's date: 1/3/2023  Patient name: Srinivasan Stephen  : 1970  MRN: [id number]  Referring provider: Hannah Reddy MD  Dx:   Encounter Diagnosis     ICD-10-CM    1  Other spondylosis with myelopathy, cervical region  M47 12       2  Weakness of both hands  R29 898                      Subjective:  "I can  a garbage bag now "      Objective: See RE for details  See treatment diary below  Assessment: See RE for details  Pt tolerated treatment well  Pt presents with increased strength, improved sensation and reports improved functional use of BUEs  Pt would benefit from continued skilled OT to further improve BUE strength and functional status  Upgraded HEP to include yellow theraband strengthening exercises as noted below  Plan: Progress treatment as tolerated  Progress PRE's each appointment with added reps or resistance  FOTO next visit     Precautions:  Decreased protective sensation of hands  Access Code: [email or web address]  URL: [email or web address]  Date: 2023  Prepared by: Malcom Lester    Exercises  • Shoulder External Rotation and Scapular Retraction with Resistance - 1 x daily - 7 x weekly - 2 sets - 10 reps  • Seated Elbow Flexion with Resistance - 1 x daily - 7 x weekly - 2 sets - 10 reps  • Seated Elbow Extension with Self-Anchored Resistance - 1 x daily - 7 x weekly - 2 sets - 10 reps          Date 10/27 11/3 11/10 11/17 12/1 12/8 1/3      Visit 1 2 3 4 5 6 7 (RE)      Manuals                                                                 Neuro Re-Ed                                                                                                        Ther Ex 10'  15   15'   23' 23' 12' 40'      HEP Wrist E PRE, yellow putty    Supine   Sh E/F  With cane   YTB exer      PROM 1:1  R/L wrist and hands  4' R/L  Wrist, hands 4' R/L  Wrist hands4' R/L  Wrist hands 4'        PRE FA/Wrist  Y F bar [place]  1x10 Y bar  [place]  2x10 Y/R F bar  [place]  1x10  Reverse   Y/R F bar  [place]  1x10  Reverse Y/R F bar  [place]  15x  Reverse       PRE FA S/P  Y F bar S/P  1x10 ea  R/L  Y F bar  2x10  #1 bar  1/2 shaft  2x10 #1 x  20  Both hands   #1 x  20  Both hands        PRE wrist isometric    R- R F bar  L- Y F bar  P/P 2x10 each R- R F bar  L- Y F bar  P/P 2x10 each R - RFB  L - YFB P/P 2x10 each       UE PRE    L #1  Elbow f, punch out  R UA  x10         Wall slides       R/L 3x10 each      B/L ER       YTB 2x10                    B/L tricep ext       YTB 2x10      B/L bicep curls       YTB 2x10                                             Ther Activity    23   23'   15' 15' 18'       Gripping  individ  Y D flex  Each x10  Together  x10  Each hand Y Dig flex  Each x10  Both hands R Red  L yellow  digiflex  x20 R Red  L yellow  digiflex  x20 R Red  L yellow  digiflex  x20       Gripping isometric  Dowel into putty  x20 each hand Dowel into putty  x25  Both hands Dowel into putty  x25   Each hand Dowel into putty  x25   Each hand Dowel into putty  x25   Each hand       Pinching  Velcro cubs  Full bd G C pins  Full web  Both hands C pins on tub YRG  x15 each hand  C pins on tub YRG  x15 each hand; Blue 5x       Manipulation   Groove      Pegs    Full board Groove pegs  Full bd each hand   Groove pegs  Full bd each hand       Gripping isometric  TB turning  1-6 x2 each hand Gray #2  5 blocks  4 transfer  Gabriella Righter #2  5 blocks  4 transfer        Gait Training                                       Modalities

## 2023-01-03 NOTE — PROGRESS NOTES
Progress Note    Today's date: 1/3/2023  Patient name: Jing Reyes  : 1970  MRN: [id number]  Referring provider: Isis Samson MD  Dx:   Encounter Diagnosis     ICD-10-CM    1  Cervical radiculopathy  M54 12       2  S/P cervical spinal fusion  Z98 1       3  Lumbar radiculopathy  M54 16         Start Time: 0800  Stop Time: 0838  Total time in clinic (min): 38 minutes    Assessment/Plan Pt has been coming to skilled PT since august for cervical myelopathy s/p spinal fusion  Since last progress note, pt has made significant improvements in balance, LE power and strength  He is currently ambulating without RW, and able to stand upright without UE support  He tolerated treatment good  Per testing, pt remains an increased risk for falls, however has demonstrated improved significant improvement  Patient would benefit from continued PT to improve functional mobility independence and dec risk for falls  Plan to see 1 mo 1x/week  Subjective Pt reports he has been doing his HEP  Have not seen due to transportation difficulties  Pt reports he has had a new muscle relaxer and it has been helping  He did take it this morning  Objective See treatment diary below  Functional outcomes :  5x sit to stand: 55sec  (seconds)  Functional outcome comment: 5xSTS: must use BUE, increase L knee pain/"locking"  Functional outcomes :  5xSTS: 34sec  Functional outcome comment 5xSTS: must use BUE, no L knee pain   Functional outcomes 1/3:    5xSTS: 21 16s  Functional outcome comment 5xSTS     Precautions: Begin to [employer] collar (have off for watching, TV, reading, sleep, etc) as of 9/15  Fall precaution  Avoid excessive flexion/extension  Lifting heavy objects >5-6 Lbs   No axial loading      * indicates exercises included in HEP     Manuals 11/3 11/10 11/17 11/23  12/1 12/8  1/3      PROM shoulders                                                              Pt education Fall   reaching out to PCP about additional resources to manage spasms              Neuro Re-Ed                    Toe taps to cone   Toe taps to cone 1# AW B/L 20x ea   Toe taps to cone 1 5# AW B/L 20x ea  Toe taps to cone 1 5# AW B/L 20x ea          Tandem stance                    Side stepping 4 laps at bar 1# AW B/L LE 4 laps at bar 1# AW B/L LE 4 laps at bar 1# AW B/L LE 4 laps at bar 1 5# AW B/L LE  2 laps at bar 1 5# AW B/L LE  2 laps at bar 1 5# AW B/L LE  2 laps 1 5# AW B/L LE YTB      TKE                    Static balance on foam                    Core stabilization                     Step ups on foam 2x10 ea LE with 1UE support firim 2x10 ea LE with 1UE support 1 5# AW fwd and lat ea [id number] ea LE with 1UE support 1 5# AW fwd and lat ea [id number] ea LE with 1UE support 1 5# AW fwd and lat ea  [id number] ea LE with 1UE support 1 5# AW fwd and lat ea          Ther Ex                    Bike 5 min c/o knee stiffness 10 min  10 min, L hip pain 10 min  8 min improve ROM, stopped at 8 min due to inc B/L LE pain 10'  10'      UBE                    Standing marches 3x10 B/L LE  1# AW at bar 3x10 B/L LE  1# AW at bar 3x10 B/L LE  1# AW at bar 3x10 B/L LE  1 5# AW at bar  3x10 B/L LE  1 5# AW at bar 3x10 B/L LE  1 5# AW at bar 3x10 B/L LE  1 5# AW at bar      Pulleys                    Scap set/blade squeeze                    TB rows & ext                    Bicep curls                     Tricep "chair push-up"                    Cervical AROM (standing)                    Standing hip ext & abd 2x10 B/L LE  1# AW at bar   2x10 B/L LE  1# AW at bar 2x10 B/L LE  1 5# AW at bar  3x10 B/L LE  1 5# AW at bar  3x10 B/L LE  1 5# AW at bar  3x10 B/L LE  1 5# AW at bar      HR/TR                    LAQ   2  5#AW B/L                Seated hamstring curls   2  5#AW B/L on beasy board     1  5#AW B/L on beasy board          TG                    Leg press 45# 3x 15   45# 3x15 45# 3x15    65# 2x10  75# x10        Physioball rollout               Standing hip flexor stretch   4x30" B/L                Standing calf stretch 4x30" B/L 4x30" B/L 4x30" B/L    4x30" B/L          Hamstring stretch         4x30" B/L 4x30" B/L  4x30"      Vital monitoring EB EB EB              Therapeutic rest EB EB EB    EB    EB      Mini squats 2UE support         3x10 3x10  3x10      Ther Activity                    Obstacle course                     Weaving around cones                    Step-ups     x15 B 4" fwd only  1 5# AW B/L x15 B 4" fwd only  1 5# AW B/L      20x ea 4" 1 5# AW B/L fwd and lat      Mini-squats     3x10              Patient Education                    Gait Training                    W/ RW                    SPC   2 laps around clinic,                 Modalities

## 2023-01-10 ENCOUNTER — OFFICE VISIT (OUTPATIENT)
Dept: OCCUPATIONAL THERAPY | Facility: CLINIC | Age: 53
End: 2023-01-10

## 2023-01-10 ENCOUNTER — OFFICE VISIT (OUTPATIENT)
Dept: PHYSICAL THERAPY | Facility: CLINIC | Age: 53
End: 2023-01-10

## 2023-01-10 DIAGNOSIS — M54.12 CERVICAL RADICULOPATHY: ICD-10-CM

## 2023-01-10 DIAGNOSIS — R29.898 WEAKNESS OF BOTH HANDS: ICD-10-CM

## 2023-01-10 DIAGNOSIS — Z98.1 S/P CERVICAL SPINAL FUSION: Primary | ICD-10-CM

## 2023-01-10 DIAGNOSIS — M47.12 OTHER SPONDYLOSIS WITH MYELOPATHY, CERVICAL REGION: Primary | ICD-10-CM

## 2023-01-10 NOTE — PROGRESS NOTES
Daily Note     Today's date: 1/10/2023  Patient name: Ekaterina Allison  : 1970  MRN: [id number]  Referring provider: Maya Chandra MD  Dx:   Encounter Diagnosis     ICD-10-CM    1  Other spondylosis with myelopathy, cervical region  M47 12       2  Weakness of both hands  R29 338                      Subjective: "I feel like I worked it " (post)      Objective: See treatment diary below      Assessment: Tolerated treatment well  Pt reports fatigue at end of session  Patient would benefit from continued OT to further improve functional strength  Plan: Continue per plan of care  Precautions:  Decreased protective sensation of hands  Access Code: [email or web address]  URL: [email or web address]  Date: 2023  Prepared by: Laura Leslie    Exercises  • Shoulder External Rotation and Scapular Retraction with Resistance - 1 x daily - 7 x weekly - 2 sets - 10 reps  • Seated Elbow Flexion with Resistance - 1 x daily - 7 x weekly - 2 sets - 10 reps  • Seated Elbow Extension with Self-Anchored Resistance - 1 x daily - 7 x weekly - 2 sets - 10 reps          Date 10/27 11/3 11/10 11/17 12/1 12/8 1/3 1/10     Visit 1 2 3 4 5 6 7 (RE) 8     Manuals                                       Neuro Re-Ed                                                                                                        Ther Ex 10'  15   15'   23' 23' 12' 40' 35'     HEP Wrist E PRE, yellow putty    Supine   Sh E/F  With cane   YTB exer      PROM 1:1  R/L wrist and hands  4' R/L  Wrist, hands 4' R/L  Wrist hands4' R/L  Wrist hands 4'        PRE FA/Wrist  Y F bar [place]  1x10 Y bar  [place]  2x10 Y/R F bar  [place]  1x10  Reverse   Y/R F bar  [place]  1x10  Reverse Y/R F bar  [place]  15x  Reverse  YFB 15x  RFB 15x     PRE FA S/P  Y F bar S/P  1x10 ea  R/L  Y F bar  2x10  #1 bar  1/2 shaft  2x10 #1 x  20  Both hands   #1 x  20  Both hands        PRE wrist isometric    R- R F bar  L- Y F bar  P/P 2x10 each R- R F bar  L- Y F bar  P/P 2x10 each R - RFB  L - YFB P/P 2x10 each       UE PRE    L #1  Elbow f, punch out  R UA  x10         Wall slides       R/L 3x10 each      B/L ER       YTB 2x10                    B/L tricep ext       YTB 2x10      B/L bicep curls       YTB 2x10      BUE endurance        UBE L1 2'/2'     Finger ladder        B/L 10x ea     B/L wrist stab        YFB on table 10x 4d     Ther Activity    23   23'   15' 15' 18'  10'     Gripping  individ  Y D flex  Each x10  Together  x10  Each hand Y Dig flex  Each x10  Both hands R Red  L yellow  digiflex  x20 R Red  L yellow  digiflex  x20 R Red  L yellow  digiflex  x20       Gripping isometric  Dowel into putty  x20 each hand [place] into putty  x25  Both hands [place] into putty  x25   Each hand [place] into putty  x25   Each hand [place] into putty  x25   Each hand       Pinching  Velcro cubs  Full bd G C pins  Full web  Both hands C pins on tub YRG  x15 each hand  C pins on tub YRG  x15 each hand; Blue 5x  R/G pinch ring 2x B/L     Manipulation   Groove      Pegs    Full board Groove pegs  Full bd each hand   Groove pegs  Full bd each hand       Gripping isometric  TB turning  1-6 x2 each hand Gray #2  5 blocks  4 transfer  Miranda #2  5 blocks  4 transfer        Gripping        RPW 3x10 B/L                               Modalities

## 2023-01-10 NOTE — PROGRESS NOTES
Daily Note     Today's date: 1/10/2023  Patient name: Alfonso Arenas  : 1970  MRN: [id number]  Referring provider: Dianna Urbina MD  Dx:   Encounter Diagnosis     ICD-10-CM    1  S/P cervical spinal fusion  Z98 1       2  Cervical radiculopathy  M54 12                      Subjective: Pt reports spasms after sitting for a long period of time for therapy session prior  Objective: See treatment diary below    Assessment: Tolerated treatment well  Noted inc pain and spasms at beginning of session  Focussed on stretching and strengthening LEs, to dec spasms  With continued functional mobility, pt reported feeling "looser" and noted improved functional mobility independence and pain free movement with exercises  Patient would benefit from continued PT to dec risk for falls and maximize functional mobility independence  Plan: Continue per plan of care  Precautions: Begin to [employer] collar (have off for watching, TV, reading, sleep, etc) as of 9/15  Fall precaution  Avoid excessive flexion/extension  Lifting heavy objects >5-6 Lbs   No axial loading      * indicates exercises included in HEP     Manuals 11/3 11/10 11/17 11/23  12/1 12/8  1/3  1/10       PROM shoulders                                                                       Pt education Fall   reaching out to PCP about additional resources to manage spasms                 Neuro Re-Ed                       Toe taps to cone   Toe taps to cone 1# AW B/L 20x ea   Toe taps to cone 1 5# AW B/L 20x ea  Toe taps to cone 1 5# AW B/L 20x ea      cone taps 1 5# AW B/L tandem on foam beam       Tandem stance                       Side stepping 4 laps at bar 1# AW B/L LE 4 laps at bar 1# AW B/L LE 4 laps at bar 1# AW B/L LE 4 laps at bar 1 5# AW B/L LE  2 laps at bar 1 5# AW B/L LE  2 laps at bar 1 5# AW B/L LE  2 laps 1 5# AW B/L LE YTB  4 laps YTB    Foam beam cone taps 4 laps       TKE                       Static balance on foam                       Core stabilization                        Step ups on foam 2x10 ea LE with 1UE support firim 2x10 ea LE with 1UE support 1 5# AW fwd and lat ea [id number] ea LE with 1UE support 1 5# AW fwd and lat ea [id number] ea LE with 1UE support 1 5# AW fwd and lat ea  [id number] ea LE with 1UE support 1 5# AW fwd and lat ea             hurdles        4 laps ea     Ther Ex                       Bike 5 min c/o knee stiffness 10 min  10 min, L hip pain 10 min  8 min improve ROM, stopped at 8 min due to inc B/L LE pain 10'  10'  6' improve ROM, mm endurance and strength       UBE                       Standing marches 3x10 B/L LE  1# AW at bar 3x10 B/L LE  1# AW at bar 3x10 B/L LE  1# AW at bar 3x10 B/L LE  1 5# AW at bar  3x10 B/L LE  1 5# AW at bar 3x10 B/L LE  1 5# AW at bar 3x10 B/L LE  1 5# AW at bar  3x10 B/L LE  1 5# AW at bar       Pulleys                       Scap set/blade squeeze                       TB rows & ext                       Bicep curls                        Tricep "chair push-up"                       Cervical AROM (standing)                       Standing hip ext & abd 2x10 B/L LE  1# AW at bar   2x10 B/L LE  1# AW at bar 2x10 B/L LE  1 5# AW at bar  3x10 B/L LE  1 5# AW at bar  3x10 B/L LE  1 5# AW at bar  3x10 B/L LE  1 5# AW at bar  YTB 2x10 ea LE       HR/TR                       LAQ   2  5#AW B/L                   Seated hamstring curls   2  5#AW B/L on beasy board     1  5#AW B/L on beasy board             TG                       Leg press 45# 3x 15   45# 3x15 45# 3x15    65# 2x10  75# x10           Physioball rollout                       Standing hip flexor stretch   4x30" B/L                   Standing calf stretch 4x30" B/L 4x30" B/L 4x30" B/L    4x30" B/L             Hamstring stretch         4x30" B/L 4x30" B/L  4x30"         Vital monitoring EB EB EB                 Therapeutic rest EB EB EB    EB    EB         Mini squats 2UE support         3x10 3x10  3x10         Ther Activity                       Obstacle course                        Weaving around cones                       Step-ups     x15 B 4" fwd only  1 5# AW B/L x15 B 4" fwd only  1 5# AW B/L      20x ea 4" 1 5# AW B/L fwd and lat  1 5# 2x10 ea fwd/lat        Mini-squats     3x10          2x10       Patient Education                       Gait Training                       W/ RW                       SPC   2 laps around clinic,                    Modalities

## 2023-01-17 ENCOUNTER — APPOINTMENT (OUTPATIENT)
Dept: PHYSICAL THERAPY | Facility: CLINIC | Age: 53
End: 2023-01-17

## 2023-01-19 ENCOUNTER — HOSPITAL ENCOUNTER (OUTPATIENT)
Dept: RADIOLOGY | Facility: HOSPITAL | Age: 53
Discharge: HOME | End: 2023-01-19

## 2023-01-19 DIAGNOSIS — Z98.1 HISTORY OF FUSION OF CERVICAL SPINE: ICD-10-CM

## 2023-01-19 DIAGNOSIS — Z98.1 HISTORY OF FUSION OF CERVICAL SPINE: Primary | ICD-10-CM

## 2023-01-20 ENCOUNTER — OFFICE VISIT (OUTPATIENT)
Dept: NEUROSURGERY | Facility: CLINIC | Age: 53
End: 2023-01-20

## 2023-01-20 VITALS
OXYGEN SATURATION: 97 % | HEIGHT: 72 IN | BODY MASS INDEX: 23.57 KG/M2 | TEMPERATURE: 98.2 F | WEIGHT: 174 LBS | RESPIRATION RATE: 18 BRPM | HEART RATE: 72 BPM | SYSTOLIC BLOOD PRESSURE: 140 MMHG | DIASTOLIC BLOOD PRESSURE: 82 MMHG

## 2023-01-20 DIAGNOSIS — Z98.1 S/P CERVICAL SPINAL FUSION: Primary | ICD-10-CM

## 2023-01-20 DIAGNOSIS — G95.9 MYELOPATHY (HCC): ICD-10-CM

## 2023-01-20 RX ORDER — TIZANIDINE 4 MG/1
TABLET ORAL
COMMUNITY
Start: 2022-12-08

## 2023-07-20 ENCOUNTER — TELEPHONE (OUTPATIENT)
Dept: NEUROSURGERY | Facility: CLINIC | Age: 53
End: 2023-07-20

## 2023-07-21 ENCOUNTER — TELEPHONE (OUTPATIENT)
Dept: NEUROSURGERY | Facility: CLINIC | Age: 53
End: 2023-07-21

## 2023-07-21 ENCOUNTER — TRANSCRIBE ORDERS (OUTPATIENT)
Dept: NEUROSURGERY | Facility: CLINIC | Age: 53
End: 2023-07-21

## 2023-07-21 DIAGNOSIS — M47.12 OTHER SPONDYLOSIS WITH MYELOPATHY, CERVICAL REGION: Primary | ICD-10-CM

## 2023-07-21 NOTE — TELEPHONE ENCOUNTER
Received message on nurse line that patient was in need of new xr script prior to his upcoming appt. I saw his xr that was ordered had . Placed new c-spine x-ray. Called patient back with no answer. Left a detailed message for him.

## 2023-07-26 ENCOUNTER — HOSPITAL ENCOUNTER (OUTPATIENT)
Dept: RADIOLOGY | Facility: HOSPITAL | Age: 53
Discharge: HOME | End: 2023-07-26
Payer: COMMERCIAL

## 2023-07-26 ENCOUNTER — OFFICE VISIT (OUTPATIENT)
Dept: NEUROSURGERY | Facility: CLINIC | Age: 53
End: 2023-07-26
Payer: COMMERCIAL

## 2023-07-26 VITALS
HEART RATE: 70 BPM | TEMPERATURE: 98.1 F | BODY MASS INDEX: 23.6 KG/M2 | SYSTOLIC BLOOD PRESSURE: 124 MMHG | DIASTOLIC BLOOD PRESSURE: 80 MMHG | HEIGHT: 72 IN

## 2023-07-26 DIAGNOSIS — Z98.1 STATUS POST CERVICAL SPINAL FUSION: ICD-10-CM

## 2023-07-26 DIAGNOSIS — M47.12 OTHER SPONDYLOSIS WITH MYELOPATHY, CERVICAL REGION: Primary | ICD-10-CM

## 2023-07-26 DIAGNOSIS — M47.12 OTHER SPONDYLOSIS WITH MYELOPATHY, CERVICAL REGION: ICD-10-CM

## 2023-07-26 PROCEDURE — 99214 OFFICE O/P EST MOD 30 MIN: CPT | Performed by: NEUROLOGICAL SURGERY

## 2023-07-26 PROCEDURE — 72052 X-RAY EXAM NECK SPINE 6/>VWS: CPT

## 2023-07-26 NOTE — PROGRESS NOTES
Neurosurgery Office Note  Darell Hummel 48 y.o. male MRN: [id number]      Assessment/Plan     Status post cervical spinal fusion  · Patient presents for 1 year follow-up status post C2-T1 PCDF with C4-5 foraminotomies on 7/19/2022 by Dr. Demian Lima. · Imaging reviewed personally and by attending. Final results below discussed with the patient. · X-ray cervical spine 7/26/2023: Expected postop changes s/p PCDF C2-T1 with hardware intact. No instability noted with flexion and extension views. Plan  · Metrics  · 7/26/2023 metrics:  VAS: 50, ECOG 1 KPS 60, mJOA 10/17,  · 10/21/22 metrics:  EQ5D5L 21101, VAS 60, mJOA 9/17  · 09/09/22 Metrics: EQ5D5L 98500=9.562; VAS 70; MJOA 10/17  · 06/07/22 Metrics: EQ5D5L 02914=3.266; VAS 50; MJOA 9/17  · Pain management with over-the-counter and prescribed medications as needed. Referral to pain management provided. · Encouraged patient to continue physical therapy. Referral to PMR provided. · Xrays show stable postop changes with hardware intact. Pt reports some improvement in his sx though reports he continues to have some myelopathic sx. At this time, no further neurosurgical intervention is anticipated. Recommend patient continue conservative management with physical therapy and pain management as needed. · Further follow-up will be on as needed basis. · Patient made aware to contact neurosurgery with any questions or concerns. Diagnoses and all orders for this visit:    Other spondylosis with myelopathy, cervical region  -     Ambulatory Referral to Physiatry; Future  -     Ambulatory Referral to Pain Management; Future    Status post cervical spinal fusion  -     Ambulatory Referral to Physiatry; Future  -     Ambulatory Referral to Pain Management;  Future          I have spent a total time of 45 minutes on 07/26/23 in caring for this patient including Diagnostic results, Instructions for management, Patient and family education, Impressions, Counseling / Coordination of care, Documenting in the medical record, Reviewing / ordering tests, medicine, procedures  , Obtaining or reviewing history   and Communicating with other healthcare professionals . CHIEF COMPLAINT    Chief Complaint   Patient presents with   • Follow-up     1  F/U - S/p C2 T1 PCF with C4-5 foraminotomies on 7/19/2022   C/S XRays done 7/26/23       HISTORY    History of Present Illness     48y.o. year old male     Patient presents for 1 year follow-up status post C2-T1 PCD F with a C4-5 foraminotomies on 7/19/2022 by Dr. Kyle Rainey. Reports that he continues to have intermittent, cramping neck pain that he rated as 5/10 on the pain scale. Reports that his pain is aggravated by movement such as sitting and laying down. Reports his neck pain radiates to the right shoulder, right arm and right hand. Patient also reports numbness and tingling in the right upper extremity. He reports difficulty with grasping things. Patient reports he continues to use a cane for mobility outside the house or walking long distances but at home and at work he does not use it. He denies any new changes with bowel or bladder function. 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 He reports that when he returned to work he stopped PT. He reports that at this time, he does not take any medications for pain. REVIEW OF SYSTEMS    Review of Systems   Constitutional: Negative. HENT: Negative. Eyes: Negative. Respiratory: Negative. Cardiovascular: Negative. Gastrointestinal: Negative. Endocrine: Negative. Genitourinary: Positive for urgency. Musculoskeletal: Positive for gait problem (unsteady when walking, uses cane for assistance), myalgias, neck pain and neck stiffness (decrease ROM , worse when turning his head to the left).         1  F/U - S/p C2 T1 PCF with C4-5 foraminotomies on 7/19/2022      Neck pain radiates to right shoulder, arm and hand. His pain is an intermittent, cramping pain and he rates it a 5/10 today. His pain is aggravated by sitting and laying down. Meds: Takes nothing for pain  C/S XRays done 7/26/23   Skin: Negative. Allergic/Immunologic: Negative. Neurological: Positive for weakness (right arm/ hand, difficulty with ) and numbness (and tingling and burning on right arm). Hematological: Negative. Psychiatric/Behavioral: Positive for sleep disturbance (due to pain). Negative for self-injury. All other systems reviewed and are negative. ROS obtained by MA. Reviewed. See HPI.      Meds/Allergies     Current Outpatient Medications   Medication Sig Dispense Refill   • acetaminophen (TYLENOL) 500 mg tablet Take 1,000 mg by mouth if needed for mild pain (Patient not taking: Reported on 1/20/2023)     • docusate sodium (COLACE) 100 mg capsule Take 1 capsule (100 mg total) by mouth 2 (two) times a day (Patient not taking: Reported on 1/20/2023)  0   • finasteride (PROSCAR) 5 mg tablet Take 5 mg by mouth daily pt reports he is not taking this medication  (Patient not taking: Reported on 9/9/2022)     • gabapentin (NEURONTIN) 100 mg capsule Take 1 capsule (100 mg total) by mouth 3 (three) times a day 90 capsule 0   • meloxicam (MOBIC) 15 mg tablet Take 15 mg by mouth daily (Patient not taking: Reported on 1/20/2023)     • methocarbamol (ROBAXIN) 750 mg tablet Take 1 tablet (750 mg total) by mouth 2 (two) times a day as needed for muscle spasms (Patient not taking: Reported on 1/20/2023) 60 tablet 0   • oxyCODONE (ROXICODONE) 5 immediate release tablet Take 1 tablet (5 mg total) by mouth 2 (two) times a day as needed for severe pain Max Daily Amount: 10 mg (Patient not taking: Reported on 10/21/2022) 24 tablet 0   • tamsulosin (FLOMAX) 0.4 mg Take 0.4 mg by mouth daily (Patient not taking: Reported on 1/20/2023)     • tiZANidine (ZANAFLEX) 4 mg tablet TAKE ONE TABLET BY MOUTH THREE TIMES A DAY AS NEEDED FOR PAIN OR SPASM (Patient not taking: Reported on 2023)       No current facility-administered medications for this visit. Allergies   Allergen Reactions   • Dilaudid [Hydromorphone] Anxiety, Other (See Comments) and Hypertension     Increase in temperature         PAST HISTORY    Past Medical History:   Diagnosis Date   • Enlarged prostate        Past Surgical History:   Procedure Laterality Date   • ABCESS DRAINAGE     • KNEE ARTHROSCOPY Left     as a teenager   • AL ARTHRD PST/PSTLAT TQ 1NTRSPC CRV BELW C2 SEGMENT N/A 2022    Procedure: POSTERIOR CERVICAL LAMINECTOMY C3-6, FIXATION FUSION C2-T1 (IMPULSE MONITORING); Surgeon: Min Brooks MD;  Location: [facility];  Service: Neurosurgery       Social History     Tobacco Use   • Smoking status: Former     Packs/day: 0.50     Types: Cigarettes     Quit date: 9/15/2021     Years since quittin.8   • Smokeless tobacco: Never   • Tobacco comments:     quit 9 months ago   Vaping Use   • Vaping Use: Never used   Substance Use Topics   • Alcohol use: Never   • Drug use: Not Currently       Family History   Problem Relation Age of Onset   • Hypertension Mother    • Rheum arthritis Mother    • Diabetes Father    • Anxiety disorder Father          Above history personally reviewed. EXAM    Vitals:Blood pressure 124/80, pulse 70, temperature 98.1 °F (36.7 °C), temperature source Temporal, height 6' (1.829 m). ,Body mass index is 23.6 kg/m². Physical Exam  Constitutional:       Appearance: He is well-developed. HENT:      Head: Normocephalic and atraumatic. Eyes:      General: No scleral icterus. Conjunctiva/sclera: Conjunctivae normal.      Pupils: Pupils are equal, round, and reactive to light. Neck:      Trachea: No tracheal deviation. Cardiovascular:      Rate and Rhythm: Normal rate. Pulmonary:      Effort: Pulmonary effort is normal.   Abdominal:      Palpations: Abdomen is soft. Tenderness:  There is no abdominal tenderness. There is no guarding. Musculoskeletal:      Cervical back: Normal range of motion and neck supple. Skin:     General: Skin is warm and dry. Coloration: Skin is not pale. Findings: No rash. Comments: Posterior cervical incision is well healed. Neurological:      Mental Status: He is alert and oriented to person, place, and time. Comments: GCS 15, Awake, Alert, Oriented x 3    Motor: GIRON, strength 5-/5 throughout. Sensation:  Decreased to pinprick LUE > RUE,  LUE laterally/medially and in the 1st digit and mildly on the RUE laterally. Decreased in BLE distally than proximally. Reflexes:2+ BUE, 3+ bilat LE. No suero's, bilateral clonus. Coordination: no drift bilateral upper extremities     Psychiatric:         Behavior: Behavior normal.         Neurologic Exam     Mental Status   Oriented to person, place, and time. Cranial Nerves     CN III, IV, VI   Pupils are equal, round, and reactive to light. MEDICAL DECISION MAKING    Imaging Studies:       I have personally reviewed pertinent reports.    and I have personally reviewed pertinent films in PACS

## 2023-07-26 NOTE — ASSESSMENT & PLAN NOTE
· Patient presents for 1 year follow-up status post C2-T1 PCDF with C4-5 foraminotomies on 7/19/2022 by Dr. Rina Castro. · Imaging reviewed personally and by attending. Final results below discussed with the patient. · X-ray cervical spine 7/26/2023: Expected postop changes s/p PCDF C2-T1 with hardware intact. No instability noted with flexion and extension views. Plan  · Metrics  · 7/26/2023 metrics:  VAS: 50, ECOG 1 KPS 60, mJOA 10/17,  · 10/21/22 metrics:  EQ5D5L 62295, VAS 60, mJOA 9/17  · 09/09/22 Metrics: EQ5D5L 04193=0.562; VAS 70; MJOA 10/17  · 06/07/22 Metrics: EQ5D5L 73326=8.266; VAS 50; MJOA 9/17  · Pain management with over-the-counter and prescribed medications as needed. Referral to pain management provided. · Encouraged patient to continue physical therapy. Referral to PMR provided. · Xrays show stable postop changes with hardware intact. Pt reports some improvement in his sx though reports he continues to have some myelopathic sx. At this time, no further neurosurgical intervention is anticipated. Recommend patient continue conservative management with physical therapy and pain management as needed. · Further follow-up will be on as needed basis. · Patient made aware to contact neurosurgery with any questions or concerns.

## 2025-02-10 ENCOUNTER — HOSPITAL ENCOUNTER (OUTPATIENT)
Dept: RADIOLOGY | Facility: HOSPITAL | Age: 55
Discharge: HOME | End: 2025-02-10
Payer: COMMERCIAL

## 2025-02-10 DIAGNOSIS — M25.561 RIGHT KNEE PAIN, UNSPECIFIED CHRONICITY: ICD-10-CM

## 2025-02-10 DIAGNOSIS — M25.562 LEFT KNEE PAIN, UNSPECIFIED CHRONICITY: ICD-10-CM

## 2025-02-10 DIAGNOSIS — M54.40 LOW BACK PAIN WITH SCIATICA, SCIATICA LATERALITY UNSPECIFIED, UNSPECIFIED BACK PAIN LATERALITY, UNSPECIFIED CHRONICITY: ICD-10-CM

## 2025-02-10 PROCEDURE — 72100 X-RAY EXAM L-S SPINE 2/3 VWS: CPT

## 2025-02-10 PROCEDURE — 73564 X-RAY EXAM KNEE 4 OR MORE: CPT

## 2025-03-19 ENCOUNTER — HOSPITAL ENCOUNTER (OUTPATIENT)
Dept: MRI IMAGING | Facility: HOSPITAL | Age: 55
Discharge: HOME | End: 2025-03-19
Payer: COMMERCIAL

## 2025-03-19 DIAGNOSIS — M17.11 UNILATERAL PRIMARY OSTEOARTHRITIS, RIGHT KNEE: ICD-10-CM

## 2025-03-19 DIAGNOSIS — M54.16 RADICULOPATHY, LUMBAR REGION: ICD-10-CM

## 2025-03-19 PROCEDURE — 73721 MRI JNT OF LWR EXTRE W/O DYE: CPT

## 2025-03-19 PROCEDURE — 72148 MRI LUMBAR SPINE W/O DYE: CPT

## (undated) DEVICE — BIPOLAR SEALER 23-113-1 AQM 2.3: Brand: AQUAMANTYS™

## (undated) DEVICE — SNAP KOVER: Brand: UNBRANDED

## (undated) DEVICE — MAYFIELD® DISPOSABLE ADULT SKULL PIN (PLASTIC BASE): Brand: MAYFIELD®

## (undated) DEVICE — GAUZE SPONGES,16 PLY: Brand: CURITY

## (undated) DEVICE — GLOVE SRG BIOGEL ECLIPSE 8

## (undated) DEVICE — GLOVE INDICATOR PI UNDERGLOVE SZ 8 BLUE

## (undated) DEVICE — TOOL 14BA20 LEGEND 14CM 2MM BA: Brand: MIDAS REX ™

## (undated) DEVICE — BETADINE OINTMENT FOIL PACK

## (undated) DEVICE — DRESSING MEPILEX AG BORDER POST-OP 4 X 8 IN

## (undated) DEVICE — SURGIFOAM 8.5 X 12.5

## (undated) DEVICE — MINOR PROCEDURE DRAPE: Brand: CONVERTORS

## (undated) DEVICE — PREP SURGICAL PURPREP 26ML

## (undated) DEVICE — ANTIBACTERIAL VIOLET BRAIDED (POLYGLACTIN 910), SYNTHETIC ABSORBABLE SUTURE: Brand: COATED VICRYL

## (undated) DEVICE — DRAPE SHEET X-LG

## (undated) DEVICE — TRAY FOLEY 16FR URIMETER SURESTEP

## (undated) DEVICE — INTENDED FOR TISSUE SEPARATION, AND OTHER PROCEDURES THAT REQUIRE A SHARP SURGICAL BLADE TO PUNCTURE OR CUT.: Brand: BARD-PARKER SAFETY BLADES SIZE 10, STERILE

## (undated) DEVICE — DRILL BIT G3606010 2.4MM

## (undated) DEVICE — 1840 FOAM BLOCK NEEDLE COUNTER: Brand: DEVON

## (undated) DEVICE — BOWL ASSY BM210 DUAL BLADE DISPOSABLE: Brand: MIDAS REX™

## (undated) DEVICE — BETHLEHEM UNIVERSAL SPINE, KIT: Brand: CARDINAL HEALTH

## (undated) DEVICE — INTENDED FOR TISSUE SEPARATION, AND OTHER PROCEDURES THAT REQUIRE A SHARP SURGICAL BLADE TO PUNCTURE OR CUT.: Brand: BARD-PARKER ® CARBON RIB-BACK BLADES

## (undated) DEVICE — PLUMEPEN PRO 10FT

## (undated) DEVICE — SPECIMEN CONTAINER STERILE PEEL PACK

## (undated) DEVICE — 3M™ TEGADERM™ TRANSPARENT FILM DRESSING FRAME STYLE, 1628, 6 IN X 8 IN (15 CM X 20 CM), 10/CT 8CT/CASE: Brand: 3M™ TEGADERM™

## (undated) DEVICE — PROXIMATE SKIN STAPLERS (35 WIDE) CONTAINS 35 STAINLESS STEEL STAPLES (FIXED HEAD): Brand: PROXIMATE

## (undated) DEVICE — HEMOSTATIC MATRIX SURGIFLO 8ML W/THROMBIN

## (undated) DEVICE — TOOL 14MH30 LEGEND 14CM 3MM: Brand: MIDAS REX ™

## (undated) DEVICE — SPONGE PVP SCRUB WING STERILE

## (undated) DEVICE — SILVER-COATED ANTIMICROBIAL BARRIER DRESSING: Brand: ACTICOAT   4" X 8"

## (undated) DEVICE — ELECTRODE BLADE E-Z CLEAN 4IN -0014A